# Patient Record
Sex: MALE | Race: WHITE | Employment: UNEMPLOYED | URBAN - METROPOLITAN AREA
[De-identification: names, ages, dates, MRNs, and addresses within clinical notes are randomized per-mention and may not be internally consistent; named-entity substitution may affect disease eponyms.]

---

## 2017-01-26 ENCOUNTER — HOSPITAL ENCOUNTER (EMERGENCY)
Age: 37
Discharge: HOME | End: 2017-01-26
Attending: EMERGENCY MEDICINE

## 2017-01-26 ENCOUNTER — APPOINTMENT (OUTPATIENT)
Dept: GENERAL RADIOLOGY | Age: 37
End: 2017-01-26

## 2017-01-26 VITALS
WEIGHT: 220 LBS | DIASTOLIC BLOOD PRESSURE: 81 MMHG | BODY MASS INDEX: 26.79 KG/M2 | HEART RATE: 80 BPM | TEMPERATURE: 98.5 F | SYSTOLIC BLOOD PRESSURE: 132 MMHG | HEIGHT: 76 IN | OXYGEN SATURATION: 99 % | RESPIRATION RATE: 20 BRPM

## 2017-01-26 DIAGNOSIS — J40 BRONCHITIS: Primary | ICD-10-CM

## 2017-01-26 PROCEDURE — 93005 ELECTROCARDIOGRAM TRACING: CPT

## 2017-01-26 PROCEDURE — 71020 XR CHEST STANDARD TWO VW: CPT

## 2017-01-26 PROCEDURE — 99284 EMERGENCY DEPT VISIT MOD MDM: CPT

## 2017-01-26 RX ORDER — DEXTROMETHORPHAN HYDROBROMIDE AND PROMETHAZINE HYDROCHLORIDE 15; 6.25 MG/5ML; MG/5ML
5 SYRUP ORAL 4 TIMES DAILY PRN
Qty: 120 ML | Refills: 0 | Status: SHIPPED | OUTPATIENT
Start: 2017-01-26 | End: 2017-02-02

## 2017-01-26 RX ORDER — AZITHROMYCIN 250 MG/1
TABLET, FILM COATED ORAL
Qty: 1 PACKET | Refills: 0 | Status: SHIPPED | OUTPATIENT
Start: 2017-01-26 | End: 2017-02-05

## 2017-01-26 ASSESSMENT — PAIN SCALES - GENERAL: PAINLEVEL_OUTOF10: 3

## 2017-01-26 ASSESSMENT — PAIN DESCRIPTION - PAIN TYPE: TYPE: ACUTE PAIN

## 2017-01-26 ASSESSMENT — PAIN DESCRIPTION - ORIENTATION: ORIENTATION: LEFT;RIGHT

## 2017-01-26 ASSESSMENT — ENCOUNTER SYMPTOMS
EYES NEGATIVE: 1
RHINORRHEA: 1
COUGH: 1
STRIDOR: 0
ALLERGIC/IMMUNOLOGIC NEGATIVE: 1
WHEEZING: 0
VOMITING: 0
ABDOMINAL PAIN: 0
TROUBLE SWALLOWING: 0
SHORTNESS OF BREATH: 0
NAUSEA: 0
SORE THROAT: 1
SINUS PRESSURE: 1

## 2017-01-26 ASSESSMENT — PAIN DESCRIPTION - FREQUENCY: FREQUENCY: INTERMITTENT

## 2017-01-26 ASSESSMENT — PAIN DESCRIPTION - ONSET: ONSET: PROGRESSIVE

## 2017-01-26 ASSESSMENT — PAIN DESCRIPTION - DESCRIPTORS: DESCRIPTORS: PRESSURE

## 2017-01-26 ASSESSMENT — PAIN DESCRIPTION - LOCATION: LOCATION: HEAD

## 2017-01-26 ASSESSMENT — PAIN DESCRIPTION - PROGRESSION: CLINICAL_PROGRESSION: NOT CHANGED

## 2017-02-09 LAB
EKG ATRIAL RATE: 76 BPM
EKG P AXIS: 50 DEGREES
EKG P-R INTERVAL: 134 MS
EKG Q-T INTERVAL: 376 MS
EKG QRS DURATION: 96 MS
EKG QTC CALCULATION (BAZETT): 423 MS
EKG R AXIS: 30 DEGREES
EKG T AXIS: 26 DEGREES
EKG VENTRICULAR RATE: 76 BPM

## 2018-01-23 ENCOUNTER — HOSPITAL ENCOUNTER (OUTPATIENT)
Dept: PREADMISSION TESTING | Age: 38
Discharge: HOME | End: 2018-01-23
Payer: MEDICAID

## 2018-01-23 VITALS
HEIGHT: 76 IN | RESPIRATION RATE: 16 BRPM | DIASTOLIC BLOOD PRESSURE: 106 MMHG | WEIGHT: 238.8 LBS | OXYGEN SATURATION: 98 % | TEMPERATURE: 97.5 F | SYSTOLIC BLOOD PRESSURE: 173 MMHG | BODY MASS INDEX: 29.08 KG/M2 | HEART RATE: 66 BPM

## 2018-01-23 DIAGNOSIS — F17.200 SMOKER: Chronic | ICD-10-CM

## 2018-01-23 PROBLEM — H91.90 HEARING LOSS: Status: ACTIVE | Noted: 2018-01-23

## 2018-01-23 PROBLEM — I10 HTN (HYPERTENSION): Chronic | Status: ACTIVE | Noted: 2018-01-23

## 2018-01-23 PROBLEM — H66.90 OTITIS MEDIA: Status: ACTIVE | Noted: 2018-01-23

## 2018-01-23 PROBLEM — H93.19 TINNITUS: Status: ACTIVE | Noted: 2018-01-23

## 2018-01-23 RX ORDER — SODIUM CHLORIDE 0.9 % (FLUSH) 0.9 %
10 SYRINGE (ML) INJECTION PRN
Status: CANCELLED | OUTPATIENT
Start: 2018-01-23

## 2018-01-23 RX ORDER — SODIUM CHLORIDE 0.9 % (FLUSH) 0.9 %
10 SYRINGE (ML) INJECTION EVERY 12 HOURS SCHEDULED
Status: CANCELLED | OUTPATIENT
Start: 2018-01-23

## 2018-01-23 RX ORDER — SODIUM CHLORIDE, SODIUM LACTATE, POTASSIUM CHLORIDE, CALCIUM CHLORIDE 600; 310; 30; 20 MG/100ML; MG/100ML; MG/100ML; MG/100ML
INJECTION, SOLUTION INTRAVENOUS CONTINUOUS
Status: CANCELLED | OUTPATIENT
Start: 2018-01-23

## 2018-01-23 RX ORDER — LIDOCAINE HYDROCHLORIDE 10 MG/ML
1 INJECTION, SOLUTION EPIDURAL; INFILTRATION; INTRACAUDAL; PERINEURAL
Status: CANCELLED | OUTPATIENT
Start: 2018-01-23 | End: 2018-01-23

## 2018-01-23 ASSESSMENT — ENCOUNTER SYMPTOMS
STRIDOR: 0
ABDOMINAL PAIN: 0
SORE THROAT: 0
RESPIRATORY NEGATIVE: 1
CONSTIPATION: 0
COUGH: 0
DIARRHEA: 0
WHEEZING: 0
BACK PAIN: 0
SHORTNESS OF BREATH: 0
HEARTBURN: 0
VOMITING: 0
NAUSEA: 0
EYES NEGATIVE: 1

## 2018-01-23 NOTE — H&P
Genitourinary Comments: deferred   Musculoskeletal: Normal range of motion. He exhibits no edema or tenderness. Lymphadenopathy:     He has no cervical adenopathy. Neurological: He is alert and oriented to person, place, and time. Skin: Skin is warm. No rash noted. No erythema.        Assessment:  Bilateral hearing loss, Otitis media, tinnitus    Plan:  Bilateral myringotomy with ventilating tube insertion    Josefina Victoria NP  1/23/2018

## 2018-02-07 ENCOUNTER — ANESTHESIA EVENT (OUTPATIENT)
Dept: OPERATING ROOM | Age: 38
End: 2018-02-07
Payer: COMMERCIAL

## 2018-02-07 ASSESSMENT — LIFESTYLE VARIABLES: SMOKING_STATUS: 1

## 2018-02-08 ENCOUNTER — HOSPITAL ENCOUNTER (OUTPATIENT)
Age: 38
Setting detail: OUTPATIENT SURGERY
Discharge: HOME | End: 2018-02-08
Attending: OTOLARYNGOLOGY | Admitting: OTOLARYNGOLOGY
Payer: COMMERCIAL

## 2018-02-08 ENCOUNTER — ANESTHESIA (OUTPATIENT)
Dept: OPERATING ROOM | Age: 38
End: 2018-02-08
Payer: COMMERCIAL

## 2018-02-08 VITALS — OXYGEN SATURATION: 94 % | DIASTOLIC BLOOD PRESSURE: 76 MMHG | SYSTOLIC BLOOD PRESSURE: 123 MMHG

## 2018-02-08 VITALS
DIASTOLIC BLOOD PRESSURE: 100 MMHG | SYSTOLIC BLOOD PRESSURE: 160 MMHG | RESPIRATION RATE: 16 BRPM | TEMPERATURE: 98 F | WEIGHT: 238 LBS | HEIGHT: 76 IN | BODY MASS INDEX: 28.98 KG/M2 | HEART RATE: 68 BPM | OXYGEN SATURATION: 97 %

## 2018-02-08 DIAGNOSIS — H65.23 BILATERAL CHRONIC SEROUS OTITIS MEDIA: Primary | ICD-10-CM

## 2018-02-08 PROCEDURE — 3600000002 HC SURGERY LEVEL 2 BASE: Performed by: OTOLARYNGOLOGY

## 2018-02-08 PROCEDURE — 3600000012 HC SURGERY LEVEL 2 ADDTL 15MIN: Performed by: OTOLARYNGOLOGY

## 2018-02-08 PROCEDURE — 2580000003 HC RX 258: Performed by: OTOLARYNGOLOGY

## 2018-02-08 PROCEDURE — 6360000002 HC RX W HCPCS: Performed by: NURSE ANESTHETIST, CERTIFIED REGISTERED

## 2018-02-08 PROCEDURE — 3700000001 HC ADD 15 MINUTES (ANESTHESIA): Performed by: OTOLARYNGOLOGY

## 2018-02-08 PROCEDURE — 6370000000 HC RX 637 (ALT 250 FOR IP): Performed by: OTOLARYNGOLOGY

## 2018-02-08 PROCEDURE — 3700000000 HC ANESTHESIA ATTENDED CARE: Performed by: OTOLARYNGOLOGY

## 2018-02-08 PROCEDURE — 6360000002 HC RX W HCPCS: Performed by: ANESTHESIOLOGY

## 2018-02-08 PROCEDURE — 7100000000 HC PACU RECOVERY - FIRST 15 MIN: Performed by: OTOLARYNGOLOGY

## 2018-02-08 PROCEDURE — 6370000000 HC RX 637 (ALT 250 FOR IP): Performed by: ANESTHESIOLOGY

## 2018-02-08 PROCEDURE — 2580000003 HC RX 258: Performed by: STUDENT IN AN ORGANIZED HEALTH CARE EDUCATION/TRAINING PROGRAM

## 2018-02-08 PROCEDURE — 7100000010 HC PHASE II RECOVERY - FIRST 15 MIN: Performed by: OTOLARYNGOLOGY

## 2018-02-08 PROCEDURE — 7100000001 HC PACU RECOVERY - ADDTL 15 MIN: Performed by: OTOLARYNGOLOGY

## 2018-02-08 PROCEDURE — 2500000003 HC RX 250 WO HCPCS: Performed by: NURSE ANESTHETIST, CERTIFIED REGISTERED

## 2018-02-08 PROCEDURE — 2500000003 HC RX 250 WO HCPCS: Performed by: STUDENT IN AN ORGANIZED HEALTH CARE EDUCATION/TRAINING PROGRAM

## 2018-02-08 PROCEDURE — 2780000010 HC IMPLANT OTHER: Performed by: OTOLARYNGOLOGY

## 2018-02-08 DEVICE — TUBE EAR VENTILATION 1.14X7.5MM: Type: IMPLANTABLE DEVICE | Status: FUNCTIONAL

## 2018-02-08 RX ORDER — LIDOCAINE HYDROCHLORIDE 10 MG/ML
1 INJECTION, SOLUTION EPIDURAL; INFILTRATION; INTRACAUDAL; PERINEURAL
Status: COMPLETED | OUTPATIENT
Start: 2018-02-08 | End: 2018-02-08

## 2018-02-08 RX ORDER — MIDAZOLAM HYDROCHLORIDE 1 MG/ML
INJECTION INTRAMUSCULAR; INTRAVENOUS PRN
Status: DISCONTINUED | OUTPATIENT
Start: 2018-02-08 | End: 2018-02-08 | Stop reason: SDUPTHER

## 2018-02-08 RX ORDER — HYDROCODONE BITARTRATE AND ACETAMINOPHEN 5; 325 MG/1; MG/1
1 TABLET ORAL PRN
Status: COMPLETED | OUTPATIENT
Start: 2018-02-08 | End: 2018-02-08

## 2018-02-08 RX ORDER — SODIUM CHLORIDE 0.9 % (FLUSH) 0.9 %
10 SYRINGE (ML) INJECTION EVERY 12 HOURS SCHEDULED
Status: DISCONTINUED | OUTPATIENT
Start: 2018-02-08 | End: 2018-02-08 | Stop reason: HOSPADM

## 2018-02-08 RX ORDER — DEXAMETHASONE SODIUM PHOSPHATE 10 MG/ML
INJECTION INTRAMUSCULAR; INTRAVENOUS PRN
Status: DISCONTINUED | OUTPATIENT
Start: 2018-02-08 | End: 2018-02-08 | Stop reason: SDUPTHER

## 2018-02-08 RX ORDER — SODIUM CHLORIDE, SODIUM LACTATE, POTASSIUM CHLORIDE, CALCIUM CHLORIDE 600; 310; 30; 20 MG/100ML; MG/100ML; MG/100ML; MG/100ML
INJECTION, SOLUTION INTRAVENOUS CONTINUOUS
Status: DISCONTINUED | OUTPATIENT
Start: 2018-02-08 | End: 2018-02-08 | Stop reason: HOSPADM

## 2018-02-08 RX ORDER — SODIUM CHLORIDE 0.9 % (FLUSH) 0.9 %
10 SYRINGE (ML) INJECTION PRN
Status: DISCONTINUED | OUTPATIENT
Start: 2018-02-08 | End: 2018-02-08 | Stop reason: HOSPADM

## 2018-02-08 RX ORDER — ACETAMINOPHEN 325 MG/1
650 TABLET ORAL EVERY 4 HOURS PRN
Status: DISCONTINUED | OUTPATIENT
Start: 2018-02-08 | End: 2018-02-08 | Stop reason: HOSPADM

## 2018-02-08 RX ORDER — METOCLOPRAMIDE HYDROCHLORIDE 5 MG/ML
10 INJECTION INTRAMUSCULAR; INTRAVENOUS
Status: DISCONTINUED | OUTPATIENT
Start: 2018-02-08 | End: 2018-02-08 | Stop reason: HOSPADM

## 2018-02-08 RX ORDER — DIPHENHYDRAMINE HYDROCHLORIDE 50 MG/ML
12.5 INJECTION INTRAMUSCULAR; INTRAVENOUS
Status: DISCONTINUED | OUTPATIENT
Start: 2018-02-08 | End: 2018-02-08 | Stop reason: HOSPADM

## 2018-02-08 RX ORDER — MAGNESIUM HYDROXIDE 1200 MG/15ML
LIQUID ORAL CONTINUOUS PRN
Status: DISCONTINUED | OUTPATIENT
Start: 2018-02-08 | End: 2018-02-08 | Stop reason: HOSPADM

## 2018-02-08 RX ORDER — FENTANYL CITRATE 50 UG/ML
50 INJECTION, SOLUTION INTRAMUSCULAR; INTRAVENOUS EVERY 10 MIN PRN
Status: DISCONTINUED | OUTPATIENT
Start: 2018-02-08 | End: 2018-02-08 | Stop reason: HOSPADM

## 2018-02-08 RX ORDER — MEPERIDINE HYDROCHLORIDE 25 MG/ML
12.5 INJECTION INTRAMUSCULAR; INTRAVENOUS; SUBCUTANEOUS EVERY 5 MIN PRN
Status: DISCONTINUED | OUTPATIENT
Start: 2018-02-08 | End: 2018-02-08 | Stop reason: HOSPADM

## 2018-02-08 RX ORDER — ONDANSETRON 2 MG/ML
INJECTION INTRAMUSCULAR; INTRAVENOUS PRN
Status: DISCONTINUED | OUTPATIENT
Start: 2018-02-08 | End: 2018-02-08 | Stop reason: SDUPTHER

## 2018-02-08 RX ORDER — PROPOFOL 10 MG/ML
INJECTION, EMULSION INTRAVENOUS PRN
Status: DISCONTINUED | OUTPATIENT
Start: 2018-02-08 | End: 2018-02-08 | Stop reason: SDUPTHER

## 2018-02-08 RX ORDER — ONDANSETRON 2 MG/ML
4 INJECTION INTRAMUSCULAR; INTRAVENOUS
Status: DISCONTINUED | OUTPATIENT
Start: 2018-02-08 | End: 2018-02-08 | Stop reason: HOSPADM

## 2018-02-08 RX ORDER — LIDOCAINE HYDROCHLORIDE 20 MG/ML
INJECTION, SOLUTION INFILTRATION; PERINEURAL PRN
Status: DISCONTINUED | OUTPATIENT
Start: 2018-02-08 | End: 2018-02-08 | Stop reason: SDUPTHER

## 2018-02-08 RX ORDER — ONDANSETRON 2 MG/ML
4 INJECTION INTRAMUSCULAR; INTRAVENOUS EVERY 6 HOURS PRN
Status: DISCONTINUED | OUTPATIENT
Start: 2018-02-08 | End: 2018-02-08 | Stop reason: HOSPADM

## 2018-02-08 RX ORDER — FENTANYL CITRATE 50 UG/ML
INJECTION, SOLUTION INTRAMUSCULAR; INTRAVENOUS PRN
Status: DISCONTINUED | OUTPATIENT
Start: 2018-02-08 | End: 2018-02-08 | Stop reason: SDUPTHER

## 2018-02-08 RX ORDER — HYDROCODONE BITARTRATE AND ACETAMINOPHEN 5; 325 MG/1; MG/1
2 TABLET ORAL PRN
Status: COMPLETED | OUTPATIENT
Start: 2018-02-08 | End: 2018-02-08

## 2018-02-08 RX ADMIN — LIDOCAINE HYDROCHLORIDE 0.1 ML: 10 INJECTION, SOLUTION EPIDURAL; INFILTRATION; INTRACAUDAL; PERINEURAL at 07:37

## 2018-02-08 RX ADMIN — LIDOCAINE HYDROCHLORIDE 100 MG: 20 INJECTION, SOLUTION INFILTRATION; PERINEURAL at 08:46

## 2018-02-08 RX ADMIN — SODIUM CHLORIDE, POTASSIUM CHLORIDE, SODIUM LACTATE AND CALCIUM CHLORIDE: 600; 310; 30; 20 INJECTION, SOLUTION INTRAVENOUS at 09:10

## 2018-02-08 RX ADMIN — PROPOFOL 200 MG: 10 INJECTION, EMULSION INTRAVENOUS at 08:46

## 2018-02-08 RX ADMIN — FENTANYL CITRATE 50 MCG: 50 INJECTION, SOLUTION INTRAMUSCULAR; INTRAVENOUS at 09:05

## 2018-02-08 RX ADMIN — FENTANYL CITRATE 50 MCG: 50 INJECTION, SOLUTION INTRAMUSCULAR; INTRAVENOUS at 08:50

## 2018-02-08 RX ADMIN — MIDAZOLAM HYDROCHLORIDE 2 MG: 1 INJECTION, SOLUTION INTRAMUSCULAR; INTRAVENOUS at 08:35

## 2018-02-08 RX ADMIN — HYDROCODONE BITARTRATE AND ACETAMINOPHEN 2 TABLET: 5; 325 TABLET ORAL at 09:57

## 2018-02-08 RX ADMIN — ONDANSETRON 4 MG: 2 INJECTION INTRAMUSCULAR; INTRAVENOUS at 08:55

## 2018-02-08 RX ADMIN — DEXAMETHASONE SODIUM PHOSPHATE 10 MG: 10 INJECTION INTRAMUSCULAR; INTRAVENOUS at 08:55

## 2018-02-08 RX ADMIN — FENTANYL CITRATE 25 MCG: 50 INJECTION, SOLUTION INTRAMUSCULAR; INTRAVENOUS at 09:34

## 2018-02-08 RX ADMIN — PROPOFOL 50 MG: 10 INJECTION, EMULSION INTRAVENOUS at 08:49

## 2018-02-08 RX ADMIN — FENTANYL CITRATE 50 MCG: 50 INJECTION, SOLUTION INTRAMUSCULAR; INTRAVENOUS at 08:46

## 2018-02-08 RX ADMIN — FENTANYL CITRATE 50 MCG: 50 INJECTION, SOLUTION INTRAMUSCULAR; INTRAVENOUS at 08:48

## 2018-02-08 RX ADMIN — SODIUM CHLORIDE, POTASSIUM CHLORIDE, SODIUM LACTATE AND CALCIUM CHLORIDE: 600; 310; 30; 20 INJECTION, SOLUTION INTRAVENOUS at 07:37

## 2018-02-08 ASSESSMENT — PULMONARY FUNCTION TESTS
PIF_VALUE: 3
PIF_VALUE: 11
PIF_VALUE: 1
PIF_VALUE: 3
PIF_VALUE: 3
PIF_VALUE: 11
PIF_VALUE: 12
PIF_VALUE: 2
PIF_VALUE: 11
PIF_VALUE: 0
PIF_VALUE: 11
PIF_VALUE: 11
PIF_VALUE: 3
PIF_VALUE: 2
PIF_VALUE: 4
PIF_VALUE: 11
PIF_VALUE: 11
PIF_VALUE: 2
PIF_VALUE: 12
PIF_VALUE: 3
PIF_VALUE: 11
PIF_VALUE: 3
PIF_VALUE: 2
PIF_VALUE: 3
PIF_VALUE: 2
PIF_VALUE: 1
PIF_VALUE: 4
PIF_VALUE: 2
PIF_VALUE: 3
PIF_VALUE: 11
PIF_VALUE: 11
PIF_VALUE: 0

## 2018-02-08 ASSESSMENT — PAIN - FUNCTIONAL ASSESSMENT: PAIN_FUNCTIONAL_ASSESSMENT: 0-10

## 2018-02-08 ASSESSMENT — PAIN SCALES - GENERAL
PAINLEVEL_OUTOF10: 5
PAINLEVEL_OUTOF10: 3

## 2018-02-08 NOTE — BRIEF OP NOTE
Brief Postoperative Note  ______________________________________________________________    Patient: Clora Range  YOB: 1980  MRN: [id number]  Date of Procedure: 2/8/2018    Pre-Op Diagnosis: CONDUCTIVE HEARING LOSS, BILATERAL,SENSORINEURAL HEARING LOSS,BILATERAL, OTITIS MEDIA,UNSPECIFIED BILATERAL TINNITUS,BILATERAL    Post-Op Diagnosis: Same       Procedure(s):  BILATERAL MYRINGOTOMY WITH VENTILING TUBE INSERTION (PAT DONE 1/22/18)    Anesthesia: General    Surgeon(s):  Hernan Carballo MD    Staff:  Scrub Person First: Brant Maxwell     Estimated Blood Loss: * No values recorded between 2/8/2018  8:40 AM and 2/8/2018  9:20 AM * None    Complications: None    Specimens:   * No specimens in log *    Implants:    Implant Name Type Inv. Item Serial No.  Lot No. LRB No. Used   TUBE EAR VENTILATION 1.14X7. 5MM Ear TUBE EAR VENTILATION 1.14X7. 5MM  [employer] [id number] Right 1   TUBE EAR VENTILATION 1.14X7. 5MM Ear TUBE EAR VENTILATION 1.14X7. [facility] [id number] Left 1         Drains:      Findings: right ear: 2 areas with monomeric membranes left: small perforation of he TM  Hernan Carballo MD  Date: 2/8/2018  Time: 9:24 AM

## 2018-02-08 NOTE — ANESTHESIA POSTPROCEDURE EVALUATION
Department of Anesthesiology  Postprocedure Note    Patient: Frieda Byrd  MRN: [id number]  YOB: 1980  Date of evaluation: 2/8/2018  Time:  9:25 AM     Procedure Summary     Date:  02/08/18 [place]  [facility] [place] [facility] [place]    Anesthesia Start:  8497 Anesthesia Stop:      Procedure:  BILATERAL MYRINGOTOMY WITH VENTILING TUBE INSERTION (PAT DONE 1/22/18) (Bilateral ) Diagnosis:  (CONDUCTIVE HEARING LOSS, BILATERAL,SENSORINEURAL HEARING LOSS,BILATERAL, OTITIS MEDIA,UNSPECIFIED BILATERAL TINNITUS,BILATERAL)    Surgeon:  Argelia Ruth MD Responsible Provider:  Jony Vasquez MD    Anesthesia Type:  general ASA Status:  2          Anesthesia Type: general    Mike Phase I:      Mike Phase II:      Last vitals: Reviewed and per EMR flowsheets.        Anesthesia Post Evaluation    Patient location during evaluation: PACU  Patient participation: complete - patient participated  Level of consciousness: awake and alert  Pain score: 0  Airway patency: patent  Nausea & Vomiting: no nausea and no vomiting  Complications: no  Cardiovascular status: blood pressure returned to baseline and hemodynamically stable  Respiratory status: acceptable  Hydration status: euvolemic

## 2019-08-01 ENCOUNTER — OFFICE VISIT (OUTPATIENT)
Dept: FAMILY MEDICINE CLINIC | Age: 39
End: 2019-08-01
Payer: COMMERCIAL

## 2019-08-01 VITALS
OXYGEN SATURATION: 99 % | HEIGHT: 76 IN | HEART RATE: 99 BPM | DIASTOLIC BLOOD PRESSURE: 132 MMHG | WEIGHT: 255.8 LBS | BODY MASS INDEX: 31.15 KG/M2 | SYSTOLIC BLOOD PRESSURE: 178 MMHG | TEMPERATURE: 98.6 F

## 2019-08-01 DIAGNOSIS — Z13.31 POSITIVE DEPRESSION SCREENING: ICD-10-CM

## 2019-08-01 DIAGNOSIS — I10 ESSENTIAL HYPERTENSION: Primary | Chronic | ICD-10-CM

## 2019-08-01 DIAGNOSIS — F17.210 CIGARETTE NICOTINE DEPENDENCE WITHOUT COMPLICATION: ICD-10-CM

## 2019-08-01 PROCEDURE — 99214 OFFICE O/P EST MOD 30 MIN: CPT | Performed by: INTERNAL MEDICINE

## 2019-08-01 PROCEDURE — G8431 POS CLIN DEPRES SCRN F/U DOC: HCPCS | Performed by: INTERNAL MEDICINE

## 2019-08-01 RX ORDER — NICOTINE 21 MG/24HR
1 PATCH, TRANSDERMAL 24 HOURS TRANSDERMAL DAILY
Qty: 14 PATCH | Refills: 5 | Status: SHIPPED | OUTPATIENT
Start: 2019-08-01 | End: 2021-05-11

## 2019-08-01 RX ORDER — CHLORTHALIDONE 25 MG/1
25 TABLET ORAL DAILY
Qty: 30 TABLET | Refills: 3 | Status: SHIPPED | OUTPATIENT
Start: 2019-08-01 | End: 2019-09-13 | Stop reason: SDUPTHER

## 2019-08-01 RX ORDER — CITALOPRAM 20 MG/1
20 TABLET ORAL DAILY
Qty: 30 TABLET | Refills: 0 | Status: SHIPPED | OUTPATIENT
Start: 2019-08-01 | End: 2019-09-13 | Stop reason: SDUPTHER

## 2019-08-01 ASSESSMENT — PATIENT HEALTH QUESTIONNAIRE - PHQ9
1. LITTLE INTEREST OR PLEASURE IN DOING THINGS: 3
SUM OF ALL RESPONSES TO PHQ QUESTIONS 1-9: 21
4. FEELING TIRED OR HAVING LITTLE ENERGY: 3
5. POOR APPETITE OR OVEREATING: 3
SUM OF ALL RESPONSES TO PHQ QUESTIONS 1-9: 21
9. THOUGHTS THAT YOU WOULD BE BETTER OFF DEAD, OR OF HURTING YOURSELF: 0
DEPRESSION UNABLE TO ASSESS: FUNCTIONAL CAPACITY MOTIVATION LIMITS ACCURACY
2. FEELING DOWN, DEPRESSED OR HOPELESS: 3
SUM OF ALL RESPONSES TO PHQ9 QUESTIONS 1 & 2: 6
8. MOVING OR SPEAKING SO SLOWLY THAT OTHER PEOPLE COULD HAVE NOTICED. OR THE OPPOSITE, BEING SO FIGETY OR RESTLESS THAT YOU HAVE BEEN MOVING AROUND A LOT MORE THAN USUAL: 3
3. TROUBLE FALLING OR STAYING ASLEEP: 3
10. IF YOU CHECKED OFF ANY PROBLEMS, HOW DIFFICULT HAVE THESE PROBLEMS MADE IT FOR YOU TO DO YOUR WORK, TAKE CARE OF THINGS AT HOME, OR GET ALONG WITH OTHER PEOPLE: 3
7. TROUBLE CONCENTRATING ON THINGS, SUCH AS READING THE NEWSPAPER OR WATCHING TELEVISION: 3
6. FEELING BAD ABOUT YOURSELF - OR THAT YOU ARE A FAILURE OR HAVE LET YOURSELF OR YOUR FAMILY DOWN: 0

## 2019-08-01 ASSESSMENT — ENCOUNTER SYMPTOMS
BACK PAIN: 0
EYE REDNESS: 0
PHOTOPHOBIA: 0
CHEST TIGHTNESS: 0
TROUBLE SWALLOWING: 0
ABDOMINAL PAIN: 0
SHORTNESS OF BREATH: 0
SINUS PAIN: 0
RHINORRHEA: 0
VOICE CHANGE: 0
BLOOD IN STOOL: 0
EYE PAIN: 0
NAUSEA: 0
EYE ITCHING: 0
VOMITING: 0
DIARRHEA: 0
FACIAL SWELLING: 0
RECTAL PAIN: 0
EYE DISCHARGE: 0
SINUS PRESSURE: 0
ABDOMINAL DISTENTION: 0
COLOR CHANGE: 0
SORE THROAT: 0
CONSTIPATION: 0
WHEEZING: 0
COUGH: 0
APNEA: 0

## 2019-08-01 NOTE — PROGRESS NOTES
week     Comment: 6 pkg per day    Drug use: Yes     Types: Marijuana     Comment: 2-3 x per week    Sexual activity: Not on file   Lifestyle    Physical activity:     Days per week: Not on file     Minutes per session: Not on file    Stress: Not on file   Relationships    Social connections:     Talks on phone: Not on file     Gets together: Not on file     Attends Catholic service: Not on file     Active member of club or organization: Not on file     Attends meetings of clubs or organizations: Not on file     Relationship status: Not on file    Intimate partner violence:     Fear of current or ex partner: Not on file     Emotionally abused: Not on file     Physically abused: Not on file     Forced sexual activity: Not on file   Other Topics Concern    Not on file   Social History Narrative    Not on file     Family History   Problem Relation Age of Onset    High Blood Pressure Mother     Heart Disease Mother     Other Mother         copd / smoker    Stroke Father     High Blood Pressure Brother     Other Brother         anxiety     No Known Allergies  No current outpatient medications on file prior to visit. No current facility-administered medications on file prior to visit. Review of Systems   Constitutional: Positive for diaphoresis. Negative for chills, fatigue and fever. HENT: Negative for congestion, dental problem, drooling, ear discharge, ear pain, facial swelling, hearing loss, mouth sores, nosebleeds, postnasal drip, rhinorrhea, sinus pressure, sinus pain, sneezing, sore throat, tinnitus, trouble swallowing and voice change. Eyes: Negative for photophobia, pain, discharge, redness, itching and visual disturbance. Respiratory: Negative for apnea, cough, chest tightness, shortness of breath and wheezing. Cardiovascular: Positive for palpitations. Negative for chest pain and leg swelling.    Gastrointestinal: Negative for abdominal distention, abdominal pain, blood place, and time. Skin: Skin is warm and dry. No rash noted. No erythema. No pallor. Psychiatric: He has a normal mood and affect. Judgment and thought content normal.       Assessment:       Diagnosis Orders   1. Essential hypertension  Basic Metabolic Panel    CBC with Differential    Lipid Panel    TSH with Reflex    chlorthalidone (HYGROTON) 25 MG tablet   2. Cigarette nicotine dependence without complication  nicotine (NICODERM CQ) 14 MG/24HR   3. Positive depression screening  Positive Screen for Clinical Depression with a Documented Follow-up Plan          Plan:      Nikko Esqueda was seen today for establish care and anxiety. Diagnoses and all orders for this visit:    Essential hypertension  -     Basic Metabolic Panel; Future  -     CBC with Differential; Future  -     Lipid Panel; Future  -     TSH with Reflex; Future  -     chlorthalidone (HYGROTON) 25 MG tablet; Take 1 tablet by mouth daily    Cigarette nicotine dependence without complication  -     nicotine (NICODERM CQ) 14 MG/24HR; Place 1 patch onto the skin daily for 14 days    Positive depression screening  -     Positive Screen for Clinical Depression with a Documented Follow-up Plan     Other orders  -     citalopram (CELEXA) 20 MG tablet; Take 1 tablet by mouth daily         Return in 2 weeks (on 8/15/2019). Alex Wong MD    Please note, this report has been partially produced using speech recognition software  and may cause  and /or contain errors related to that system including grammar, punctuation and spelling as well as words and phrases that may seem inappropriate. If there are questions or concerns please feel free to contact me to clarify. On the basis of positive PHQ-9 screening (PHQ-9 Total Score: 21), the following plan was implemented: medication prescribed: Celexa- 20 mg- patient will call for any significant medication side effects or worsening symptoms of depression.   Patient will follow-up in 2 week(s) with PCP.

## 2019-08-06 DIAGNOSIS — I10 ESSENTIAL HYPERTENSION: Chronic | ICD-10-CM

## 2019-08-06 LAB
ANION GAP SERPL CALCULATED.3IONS-SCNC: 18 MEQ/L (ref 9–15)
ANISOCYTOSIS: ABNORMAL
BANDED NEUTROPHILS RELATIVE PERCENT: 4 %
BASOPHILS ABSOLUTE: 0 K/UL (ref 0–0.2)
BASOPHILS RELATIVE PERCENT: 0.3 %
BUN BLDV-MCNC: 9 MG/DL (ref 6–20)
CALCIUM SERPL-MCNC: 10.8 MG/DL (ref 8.5–9.9)
CHLORIDE BLD-SCNC: 91 MEQ/L (ref 95–107)
CHOLESTEROL, TOTAL: 253 MG/DL (ref 0–199)
CO2: 25 MEQ/L (ref 20–31)
CREAT SERPL-MCNC: 0.86 MG/DL (ref 0.7–1.2)
EOSINOPHILS ABSOLUTE: 0.1 K/UL (ref 0–0.7)
EOSINOPHILS RELATIVE PERCENT: 1 %
GFR AFRICAN AMERICAN: >60
GFR NON-AFRICAN AMERICAN: >60
GLUCOSE BLD-MCNC: 101 MG/DL (ref 70–99)
HCT VFR BLD CALC: 50.8 % (ref 42–52)
HDLC SERPL-MCNC: 38 MG/DL (ref 40–59)
HEMOGLOBIN: 18.1 G/DL (ref 14–18)
LDL CHOLESTEROL CALCULATED: ABNORMAL MG/DL (ref 0–129)
LYMPHOCYTES ABSOLUTE: 2.3 K/UL (ref 1–4.8)
LYMPHOCYTES RELATIVE PERCENT: 28 %
MCH RBC QN AUTO: 37.8 PG (ref 27–31.3)
MCHC RBC AUTO-ENTMCNC: 35.6 % (ref 33–37)
MCV RBC AUTO: 106.3 FL (ref 80–100)
MONOCYTES ABSOLUTE: 0.4 K/UL (ref 0.2–0.8)
MONOCYTES RELATIVE PERCENT: 4.8 %
NEUTROPHILS ABSOLUTE: 5.5 K/UL (ref 1.4–6.5)
NEUTROPHILS RELATIVE PERCENT: 63 %
PDW BLD-RTO: 15.2 % (ref 11.5–14.5)
PLATELET # BLD: 212 K/UL (ref 130–400)
PLATELET SLIDE REVIEW: NORMAL
POTASSIUM SERPL-SCNC: 3.6 MEQ/L (ref 3.4–4.9)
RBC # BLD: 4.78 M/UL (ref 4.7–6.1)
SODIUM BLD-SCNC: 134 MEQ/L (ref 135–144)
TRIGL SERPL-MCNC: 417 MG/DL (ref 0–150)
TSH REFLEX: 2.58 UIU/ML (ref 0.44–3.86)
WBC # BLD: 8.2 K/UL (ref 4.8–10.8)

## 2019-08-07 RX ORDER — ATORVASTATIN CALCIUM 20 MG/1
20 TABLET, FILM COATED ORAL DAILY
Qty: 30 TABLET | Refills: 3 | Status: SHIPPED | OUTPATIENT
Start: 2019-08-07 | End: 2019-09-10

## 2019-08-15 ENCOUNTER — OFFICE VISIT (OUTPATIENT)
Dept: FAMILY MEDICINE CLINIC | Age: 39
End: 2019-08-15
Payer: COMMERCIAL

## 2019-08-15 VITALS
SYSTOLIC BLOOD PRESSURE: 172 MMHG | HEART RATE: 100 BPM | TEMPERATURE: 98.4 F | DIASTOLIC BLOOD PRESSURE: 106 MMHG | OXYGEN SATURATION: 98 %

## 2019-08-15 DIAGNOSIS — I10 ESSENTIAL HYPERTENSION: Primary | Chronic | ICD-10-CM

## 2019-08-15 DIAGNOSIS — F10.20 UNCOMPLICATED ALCOHOL DEPENDENCE (HCC): ICD-10-CM

## 2019-08-15 PROCEDURE — 99213 OFFICE O/P EST LOW 20 MIN: CPT | Performed by: INTERNAL MEDICINE

## 2019-08-15 RX ORDER — LISINOPRIL 10 MG/1
10 TABLET ORAL DAILY
Qty: 14 TABLET | Refills: 0 | Status: SHIPPED | OUTPATIENT
Start: 2019-08-15 | End: 2019-09-13 | Stop reason: SDUPTHER

## 2019-08-15 ASSESSMENT — ENCOUNTER SYMPTOMS
TROUBLE SWALLOWING: 0
FACIAL SWELLING: 0
SINUS PAIN: 0
BLOOD IN STOOL: 0
EYE DISCHARGE: 0
VOMITING: 0
EYE REDNESS: 0
RECTAL PAIN: 0
ABDOMINAL DISTENTION: 0
VOICE CHANGE: 0
PHOTOPHOBIA: 0
WHEEZING: 0
EYE ITCHING: 0
APNEA: 0
COLOR CHANGE: 0
RHINORRHEA: 0
SHORTNESS OF BREATH: 0
NAUSEA: 0
DIARRHEA: 0
COUGH: 0
SORE THROAT: 0
ABDOMINAL PAIN: 0
CONSTIPATION: 0
CHEST TIGHTNESS: 0
EYE PAIN: 0
BACK PAIN: 0
SINUS PRESSURE: 0

## 2019-08-15 NOTE — PROGRESS NOTES
Subjective:      Patient ID: Rosangela Castellanos is a 43 y.o. male who presents today for:  Chief Complaint   Patient presents with    Hypertension     pt presents here for his 2 week follow up, pt doing well today. HPI  65-year-old male presents for two-week follow-up to manage his hypertension. He has been compliant with chlorthalidone 25 mg orally daily since our prior visit. He has been unable to dissipate in aerobic exercise. He denies cardiopulmonary symptoms. He reports consuming large quantities of alcohol and would like assistance in achieving alcohol cessation. At present he denies polyuria,  Polydipsia, constitutional, sinus, visual, or cardio pulmonary, gastrointestinal, immunological, musculoskeletal, neurologic, hematologic, or psychiatric complaints. Past Medical History:   Diagnosis Date    Depression     Hypertension 2015    trx with pills x 1 month / patient did not follow up    Smoker      Past Surgical History:   Procedure Laterality Date    FL CREATE EARDRUM OPENING,GEN ANESTH Bilateral 2/8/2018    BILATERAL MYRINGOTOMY WITH VENTILING TUBE INSERTION (PAT DONE 1/22/18) performed by Momo Hunter MD at [place] [address] History     Socioeconomic History    Marital status: Single     Spouse name: Not on file    Number of children: Not on file    Years of education: Not on file    Highest education level: Not on file   Occupational History    Not on file   Social Needs    Financial resource strain: Not on file    Food insecurity:     Worry: Not on file     Inability: Not on file    Transportation needs:     Medical: Not on file     Non-medical: Not on file   Tobacco Use    Smoking status: Current Every Day Smoker     Packs/day: 1.00     Years: 20.00     Pack years: 20.00     Types: Cigarettes    Smokeless tobacco: Never Used   Substance and Sexual Activity    Alcohol use:  Yes     Alcohol/week: 6.0 standard drinks     Types: 6 Cans of beer per week     Comment: 6 pkg per day    Drug use: Yes     Types: Marijuana     Comment: 2-3 x per week    Sexual activity: Not on file   Lifestyle    Physical activity:     Days per week: Not on file     Minutes per session: Not on file    Stress: Not on file   Relationships    Social connections:     Talks on phone: Not on file     Gets together: Not on file     Attends Islam service: Not on file     Active member of club or organization: Not on file     Attends meetings of clubs or organizations: Not on file     Relationship status: Not on file    Intimate partner violence:     Fear of current or ex partner: Not on file     Emotionally abused: Not on file     Physically abused: Not on file     Forced sexual activity: Not on file   Other Topics Concern    Not on file   Social History Narrative    Not on file     Family History   Problem Relation Age of Onset    High Blood Pressure Mother     Heart Disease Mother     Other Mother         copd / smoker    Stroke Father     High Blood Pressure Brother     Other Brother         anxiety     No Known Allergies  Current Outpatient Medications on File Prior to Visit   Medication Sig Dispense Refill    atorvastatin (LIPITOR) 20 MG tablet Take 1 tablet by mouth daily 30 tablet 3    chlorthalidone (HYGROTON) 25 MG tablet Take 1 tablet by mouth daily 30 tablet 3    nicotine (NICODERM CQ) 14 MG/24HR Place 1 patch onto the skin daily for 14 days 14 patch 5    citalopram (CELEXA) 20 MG tablet Take 1 tablet by mouth daily 30 tablet 0     No current facility-administered medications on file prior to visit. Review of Systems   Constitutional: Negative for chills, diaphoresis, fatigue and fever.    HENT: Negative for congestion, dental problem, drooling, ear discharge, ear pain, facial swelling, hearing loss, mouth sores, nosebleeds, postnasal drip, rhinorrhea, sinus pressure, sinus pain, sneezing, sore throat, tinnitus, trouble swallowing and voice

## 2019-08-19 ENCOUNTER — HOSPITAL ENCOUNTER (EMERGENCY)
Age: 39
Discharge: NURSING, REHAB OR LONG-TERM CARE | End: 2019-08-19
Attending: EMERGENCY MEDICINE
Payer: COMMERCIAL

## 2019-08-19 VITALS
WEIGHT: 256 LBS | SYSTOLIC BLOOD PRESSURE: 134 MMHG | TEMPERATURE: 98.4 F | HEART RATE: 84 BPM | OXYGEN SATURATION: 98 % | DIASTOLIC BLOOD PRESSURE: 90 MMHG | BODY MASS INDEX: 31.17 KG/M2 | RESPIRATION RATE: 18 BRPM | HEIGHT: 76 IN

## 2019-08-19 DIAGNOSIS — F41.1 ANXIETY STATE: ICD-10-CM

## 2019-08-19 DIAGNOSIS — I10 ESSENTIAL HYPERTENSION: Primary | ICD-10-CM

## 2019-08-19 LAB
ALBUMIN SERPL-MCNC: 5.1 G/DL (ref 3.5–4.6)
ALP BLD-CCNC: 150 U/L (ref 35–104)
ALT SERPL-CCNC: 508 U/L (ref 0–41)
ANION GAP SERPL CALCULATED.3IONS-SCNC: 15 MEQ/L (ref 9–15)
AST SERPL-CCNC: 553 U/L (ref 0–40)
BASOPHILS ABSOLUTE: 0.1 K/UL (ref 0–0.2)
BASOPHILS RELATIVE PERCENT: 0.9 %
BILIRUB SERPL-MCNC: 0.4 MG/DL (ref 0.2–0.7)
BUN BLDV-MCNC: 15 MG/DL (ref 6–20)
CALCIUM SERPL-MCNC: 10.1 MG/DL (ref 8.5–9.9)
CHLORIDE BLD-SCNC: 93 MEQ/L (ref 95–107)
CO2: 28 MEQ/L (ref 20–31)
CREAT SERPL-MCNC: 1.02 MG/DL (ref 0.7–1.2)
EKG ATRIAL RATE: 77 BPM
EKG P AXIS: 61 DEGREES
EKG P-R INTERVAL: 134 MS
EKG Q-T INTERVAL: 362 MS
EKG QRS DURATION: 100 MS
EKG QTC CALCULATION (BAZETT): 409 MS
EKG R AXIS: 32 DEGREES
EKG T AXIS: 21 DEGREES
EKG VENTRICULAR RATE: 77 BPM
EOSINOPHILS ABSOLUTE: 0.1 K/UL (ref 0–0.7)
EOSINOPHILS RELATIVE PERCENT: 1.2 %
GFR AFRICAN AMERICAN: >60
GFR NON-AFRICAN AMERICAN: >60
GLOBULIN: 3.3 G/DL (ref 2.3–3.5)
GLUCOSE BLD-MCNC: 104 MG/DL (ref 70–99)
HCT VFR BLD CALC: 46.4 % (ref 42–52)
HEMOGLOBIN: 17.2 G/DL (ref 14–18)
LYMPHOCYTES ABSOLUTE: 2.3 K/UL (ref 1–4.8)
LYMPHOCYTES RELATIVE PERCENT: 29.4 %
MCH RBC QN AUTO: 38.4 PG (ref 27–31.3)
MCHC RBC AUTO-ENTMCNC: 37.1 % (ref 33–37)
MCV RBC AUTO: 103.4 FL (ref 80–100)
MONOCYTES ABSOLUTE: 0.5 K/UL (ref 0.2–0.8)
MONOCYTES RELATIVE PERCENT: 5.9 %
NEUTROPHILS ABSOLUTE: 4.8 K/UL (ref 1.4–6.5)
NEUTROPHILS RELATIVE PERCENT: 62.6 %
PDW BLD-RTO: 14.3 % (ref 11.5–14.5)
PLATELET # BLD: 196 K/UL (ref 130–400)
POTASSIUM SERPL-SCNC: 3.3 MEQ/L (ref 3.4–4.9)
RBC # BLD: 4.49 M/UL (ref 4.7–6.1)
SODIUM BLD-SCNC: 136 MEQ/L (ref 135–144)
TOTAL PROTEIN: 8.4 G/DL (ref 6.3–8)
TROPONIN: <0.01 NG/ML (ref 0–0.01)
WBC # BLD: 7.7 K/UL (ref 4.8–10.8)

## 2019-08-19 PROCEDURE — 84484 ASSAY OF TROPONIN QUANT: CPT

## 2019-08-19 PROCEDURE — 99283 EMERGENCY DEPT VISIT LOW MDM: CPT

## 2019-08-19 PROCEDURE — 80053 COMPREHEN METABOLIC PANEL: CPT

## 2019-08-19 PROCEDURE — 93005 ELECTROCARDIOGRAM TRACING: CPT | Performed by: EMERGENCY MEDICINE

## 2019-08-19 PROCEDURE — 85025 COMPLETE CBC W/AUTO DIFF WBC: CPT

## 2019-08-19 PROCEDURE — 36415 COLL VENOUS BLD VENIPUNCTURE: CPT

## 2019-08-19 RX ORDER — CLONIDINE HYDROCHLORIDE 0.1 MG/1
0.2 TABLET ORAL ONCE
Status: DISCONTINUED | OUTPATIENT
Start: 2019-08-19 | End: 2019-08-19 | Stop reason: HOSPADM

## 2019-08-19 RX ORDER — PHENOBARBITAL 97.2 MG/1
97.2 TABLET ORAL 2 TIMES DAILY
COMMUNITY
End: 2019-09-10 | Stop reason: ALTCHOICE

## 2019-08-19 RX ORDER — CLONIDINE HYDROCHLORIDE 0.1 MG/1
0.1 TABLET ORAL EVERY 6 HOURS
COMMUNITY
End: 2019-09-13 | Stop reason: SDUPTHER

## 2019-08-19 ASSESSMENT — ENCOUNTER SYMPTOMS
CHEST TIGHTNESS: 1
VOMITING: 0
NAUSEA: 0
SORE THROAT: 0
ABDOMINAL PAIN: 0
EYE PAIN: 0
SHORTNESS OF BREATH: 0

## 2019-08-19 ASSESSMENT — PAIN DESCRIPTION - DESCRIPTORS: DESCRIPTORS: TIGHTNESS

## 2019-08-19 ASSESSMENT — PAIN DESCRIPTION - ORIENTATION: ORIENTATION: MID

## 2019-08-19 ASSESSMENT — PAIN DESCRIPTION - PAIN TYPE: TYPE: ACUTE PAIN

## 2019-08-19 ASSESSMENT — PAIN SCALES - GENERAL: PAINLEVEL_OUTOF10: 5

## 2019-08-19 ASSESSMENT — PAIN - FUNCTIONAL ASSESSMENT: PAIN_FUNCTIONAL_ASSESSMENT: PREVENTS OR INTERFERES SOME ACTIVE ACTIVITIES AND ADLS

## 2019-08-19 ASSESSMENT — PAIN DESCRIPTION - FREQUENCY: FREQUENCY: INTERMITTENT

## 2019-08-19 ASSESSMENT — PAIN DESCRIPTION - LOCATION: LOCATION: CHEST

## 2019-08-19 ASSESSMENT — PAIN SCALES - WONG BAKER: WONGBAKER_NUMERICALRESPONSE: 2

## 2019-08-19 ASSESSMENT — PAIN DESCRIPTION - PROGRESSION: CLINICAL_PROGRESSION: NOT CHANGED

## 2019-08-19 ASSESSMENT — PAIN DESCRIPTION - ONSET: ONSET: ON-GOING

## 2019-08-19 NOTE — ED NOTES
Pt in room resting given pop and a snack pt alert rr even non labored and aware of the transportation back to his facility      Amy Hinds RN  08/19/19 0529

## 2019-08-20 NOTE — ED PROVIDER NOTES
6 Cans of beer per week     Comment: 6 pkg per day    Drug use: Yes     Types: Marijuana     Comment: 2-3 x per week    Sexual activity: None   Lifestyle    Physical activity:     Days per week: None     Minutes per session: None    Stress: None   Relationships    Social connections:     Talks on phone: None     Gets together: None     Attends Yarsanism service: None     Active member of club or organization: None     Attends meetings of clubs or organizations: None     Relationship status: None    Intimate partner violence:     Fear of current or ex partner: None     Emotionally abused: None     Physically abused: None     Forced sexual activity: None   Other Topics Concern    None   Social History Narrative    None       SCREENINGS              PHYSICAL EXAM    (up to 7 for level 4, 8 or more for level 5)     ED Triage Vitals [08/19/19 1528]   BP Temp Temp Source Pulse Resp SpO2 Height Weight   (!) 177/100 98.4 °F (36.9 °C) Oral 88 18 98 % 6' 4\" (1.93 m) 256 lb (116.1 kg)       Physical Exam   Constitutional: He is oriented to person, place, and time. He appears well-developed and well-nourished. No distress. HENT:   Head: Normocephalic and atraumatic. Right Ear: External ear normal.   Left Ear: External ear normal.   Mouth/Throat: No oropharyngeal exudate. Eyes: Pupils are equal, round, and reactive to light. Conjunctivae are normal.   Neck: Normal range of motion. Neck supple. No JVD present. No tracheal deviation present. No thyromegaly present. Cardiovascular: Normal rate and normal heart sounds. No murmur heard. Pulmonary/Chest: Effort normal and breath sounds normal. No respiratory distress. He has no wheezes. Abdominal: Soft. Bowel sounds are normal. There is no tenderness. There is no guarding. Musculoskeletal: Normal range of motion. He exhibits no edema. Neurological: He is alert and oriented to person, place, and time. No cranial nerve deficit. Skin: Skin is warm and dry.  No rash noted. He is not diaphoretic. Psychiatric: He has a normal mood and affect. His behavior is normal.   Nursing note and vitals reviewed. DIAGNOSTIC RESULTS     EKG: All EKG's are interpreted by the Emergency Department Physician who either signs or Co-signsthis chart in the absence of a cardiologist.        RADIOLOGY:   Victor[facility] Dollar such as CT, Ultrasound and MRI are read by the radiologist. Plain radiographic images are visualized and preliminarily interpreted by the emergency physician with the below findings:        Interpretation per the Radiologist below, if available at the time ofthis note:    No orders to display         ED BEDSIDE ULTRASOUND:   Performed by ED Physician - none    LABS:  Labs Reviewed   CBC WITH AUTO DIFFERENTIAL - Abnormal; Notable for the following components:       Result Value    RBC 4.49 (*)     .4 (*)     MCH 38.4 (*)     MCHC 37.1 (*)     All other components within normal limits   COMPREHENSIVE METABOLIC PANEL - Abnormal; Notable for the following components:    Potassium 3.3 (*)     Chloride 93 (*)     Glucose 104 (*)     Calcium 10.1 (*)     Total Protein 8.4 (*)     Alb 5.1 (*)     Alkaline Phosphatase 150 (*)      (*)      (*)     All other components within normal limits   TROPONIN       All other labs were within normal range or not returned as of this dictation. EMERGENCY DEPARTMENT COURSE and DIFFERENTIAL DIAGNOSIS/MDM:   Vitals:    Vitals:    08/19/19 1528 08/19/19 1559 08/19/19 1615 08/19/19 1830   BP: (!) 177/100 (!) 137/95 133/88 (!) 134/90   Pulse: 88   84   Resp: 18      Temp: 98.4 °F (36.9 °C)      TempSrc: Oral      SpO2: 98%      Weight: 256 lb (116.1 kg)      Height: 6' 4\" (1.93 m)          Patient came in with concerns about his blood pressure. First blood pressure appeared elevated but once he calm down it was 134/90. Patient has a lot of anxiety.   Patient can go back to rehab    MDM      REASSESSMENT          CRITICAL CARE TIME   Total Critical Care time was 0 minutes, excluding separatelyreportable procedures. There was a high probability ofclinically significant/life threatening deterioration in the patient's condition which required my urgent intervention. CONSULTS:  None    PROCEDURES:  Unless otherwise noted below, none     Procedures    FINAL IMPRESSION      1. Essential hypertension    2. Anxiety state          DISPOSITION/PLAN   DISPOSITION Discharge - Pending Orders Complete 08/19/2019 05:28:00 PM      PATIENT REFERREDTO:  Kena Garcia MD  [id number] [phone]      As needed      DISCHARGEMEDICATIONS:  New Prescriptions    No medications on file     Controlled Substances Monitoring:     No flowsheet data found.     (Please note that portions of this note were completed with a voice recognition program.  Efforts were made to edit the dictations but occasionally words are mis-transcribed.)    Lakia Espinoza DO (electronically signed)  Attending Emergency Physician          Lakia Espinoza DO  08/19/19 0711

## 2019-08-21 PROCEDURE — 93010 ELECTROCARDIOGRAM REPORT: CPT | Performed by: INTERNAL MEDICINE

## 2019-08-27 ENCOUNTER — OFFICE VISIT (OUTPATIENT)
Dept: FAMILY MEDICINE CLINIC | Age: 39
End: 2019-08-27
Payer: COMMERCIAL

## 2019-08-27 VITALS
TEMPERATURE: 98.9 F | HEART RATE: 89 BPM | SYSTOLIC BLOOD PRESSURE: 140 MMHG | OXYGEN SATURATION: 99 % | DIASTOLIC BLOOD PRESSURE: 102 MMHG

## 2019-08-27 DIAGNOSIS — I10 ESSENTIAL HYPERTENSION: Chronic | ICD-10-CM

## 2019-08-27 DIAGNOSIS — M54.2 NECK PAIN: Primary | ICD-10-CM

## 2019-08-27 PROCEDURE — 99214 OFFICE O/P EST MOD 30 MIN: CPT | Performed by: INTERNAL MEDICINE

## 2019-08-27 RX ORDER — FLUTICASONE PROPIONATE 50 MCG
1 SPRAY, SUSPENSION (ML) NASAL DAILY
Qty: 1 BOTTLE | Refills: 1 | Status: SHIPPED | OUTPATIENT
Start: 2019-08-27 | End: 2019-09-28 | Stop reason: SDUPTHER

## 2019-08-27 RX ORDER — IBUPROFEN 800 MG/1
800 TABLET ORAL EVERY 6 HOURS PRN
Qty: 20 TABLET | Refills: 0 | Status: SHIPPED | OUTPATIENT
Start: 2019-08-27 | End: 2021-05-11

## 2019-08-27 ASSESSMENT — ENCOUNTER SYMPTOMS
EYE PAIN: 0
FACIAL SWELLING: 0
APNEA: 0
VOMITING: 0
BACK PAIN: 0
SHORTNESS OF BREATH: 0
NAUSEA: 0
BLOOD IN STOOL: 0
WHEEZING: 0
SINUS PAIN: 0
CHEST TIGHTNESS: 0
COLOR CHANGE: 0
CONSTIPATION: 0
EYE REDNESS: 0
SINUS PRESSURE: 0
VOICE CHANGE: 0
TROUBLE SWALLOWING: 0
ABDOMINAL DISTENTION: 0
EYE DISCHARGE: 0
RECTAL PAIN: 0
RHINORRHEA: 0
COUGH: 0
EYE ITCHING: 0
PHOTOPHOBIA: 0
ABDOMINAL PAIN: 0
SORE THROAT: 0
DIARRHEA: 0

## 2019-08-27 NOTE — PROGRESS NOTES
Review of Systems   Constitutional: Negative for chills, diaphoresis, fatigue and fever. HENT: Negative for congestion, dental problem, drooling, ear discharge, ear pain, facial swelling, hearing loss, mouth sores, nosebleeds, postnasal drip, rhinorrhea, sinus pressure, sinus pain, sneezing, sore throat, tinnitus, trouble swallowing and voice change. Neck pain   Eyes: Negative for photophobia, pain, discharge, redness, itching and visual disturbance. Respiratory: Negative for apnea, cough, chest tightness, shortness of breath and wheezing. Cardiovascular: Negative for chest pain, palpitations and leg swelling. Gastrointestinal: Negative for abdominal distention, abdominal pain, blood in stool, constipation, diarrhea, nausea, rectal pain and vomiting. Endocrine: Negative for cold intolerance, heat intolerance, polydipsia, polyphagia and polyuria. Genitourinary: Negative for decreased urine volume, difficulty urinating, dysuria, flank pain, frequency, genital sores, hematuria and urgency. Musculoskeletal: Negative for arthralgias, back pain, gait problem, joint swelling, myalgias, neck pain and neck stiffness. Skin: Negative for color change, rash and wound. Allergic/Immunologic: Negative for environmental allergies and food allergies. Neurological: Negative for dizziness, tremors, seizures, syncope, facial asymmetry, speech difficulty, weakness, light-headedness, numbness and headaches. Hematological: Negative for adenopathy. Does not bruise/bleed easily. Psychiatric/Behavioral: Negative for agitation, confusion, decreased concentration, hallucinations, self-injury, sleep disturbance and suicidal ideas. The patient is not nervous/anxious. Objective:   BP (!) 140/102 (Site: Right Upper Arm, Position: Sitting, Cuff Size: Large Adult)   Pulse 89   Temp 98.9 °F (37.2 °C) (Oral)   SpO2 99%     Physical Exam   Constitutional: He is oriented to person, place, and time.

## 2019-09-10 ENCOUNTER — OFFICE VISIT (OUTPATIENT)
Dept: FAMILY MEDICINE CLINIC | Age: 39
End: 2019-09-10
Payer: COMMERCIAL

## 2019-09-10 VITALS
TEMPERATURE: 98.4 F | OXYGEN SATURATION: 98 % | DIASTOLIC BLOOD PRESSURE: 86 MMHG | HEART RATE: 80 BPM | SYSTOLIC BLOOD PRESSURE: 114 MMHG

## 2019-09-10 DIAGNOSIS — I10 ESSENTIAL HYPERTENSION: ICD-10-CM

## 2019-09-10 DIAGNOSIS — D75.89 MACROCYTOSIS: ICD-10-CM

## 2019-09-10 DIAGNOSIS — R74.01 TRANSAMINITIS: ICD-10-CM

## 2019-09-10 DIAGNOSIS — R74.01 TRANSAMINITIS: Primary | ICD-10-CM

## 2019-09-10 LAB
ALBUMIN SERPL-MCNC: 4.6 G/DL (ref 3.5–4.6)
ALP BLD-CCNC: 91 U/L (ref 35–104)
ALT SERPL-CCNC: 107 U/L (ref 0–41)
AST SERPL-CCNC: 37 U/L (ref 0–40)
BILIRUB SERPL-MCNC: 0.3 MG/DL (ref 0.2–0.7)
BILIRUBIN DIRECT: <0.2 MG/DL (ref 0–0.4)
BILIRUBIN, INDIRECT: ABNORMAL MG/DL (ref 0–0.6)
FOLATE: 7.1 NG/ML (ref 7.3–26.1)
TOTAL PROTEIN: 7.2 G/DL (ref 6.3–8)
VITAMIN B-12: 406 PG/ML (ref 232–1245)

## 2019-09-10 PROCEDURE — 99214 OFFICE O/P EST MOD 30 MIN: CPT | Performed by: INTERNAL MEDICINE

## 2019-09-10 RX ORDER — GABAPENTIN 100 MG/1
CAPSULE ORAL
Refills: 0 | COMMUNITY
Start: 2019-09-02 | End: 2019-09-13 | Stop reason: SDUPTHER

## 2019-09-10 RX ORDER — METHOCARBAMOL 500 MG/1
750 TABLET, FILM COATED ORAL 4 TIMES DAILY
Qty: 60 TABLET | Refills: 0 | Status: SHIPPED | OUTPATIENT
Start: 2019-09-10 | End: 2019-09-23 | Stop reason: SDUPTHER

## 2019-09-10 RX ORDER — AMLODIPINE BESYLATE 5 MG/1
TABLET ORAL
Refills: 0 | COMMUNITY
Start: 2019-09-02 | End: 2019-09-25 | Stop reason: SDUPTHER

## 2019-09-10 RX ORDER — MIRTAZAPINE 15 MG/1
TABLET, FILM COATED ORAL
Qty: 15 TABLET | Refills: 0 | Status: SHIPPED | OUTPATIENT
Start: 2019-09-10 | End: 2019-09-27 | Stop reason: SDUPTHER

## 2019-09-10 RX ORDER — MIRTAZAPINE 15 MG/1
TABLET, FILM COATED ORAL
Refills: 0 | COMMUNITY
Start: 2019-09-02 | End: 2019-09-10 | Stop reason: SDUPTHER

## 2019-09-10 RX ORDER — METHOCARBAMOL 500 MG/1
750 TABLET, FILM COATED ORAL 4 TIMES DAILY
Qty: 60 TABLET | Refills: 0 | Status: SHIPPED | OUTPATIENT
Start: 2019-09-10 | End: 2019-09-10 | Stop reason: CLARIF

## 2019-09-10 ASSESSMENT — ENCOUNTER SYMPTOMS
BACK PAIN: 0
RECTAL PAIN: 0
ABDOMINAL DISTENTION: 0
PHOTOPHOBIA: 0
SORE THROAT: 0
EYE ITCHING: 0
FACIAL SWELLING: 0
APNEA: 0
VOICE CHANGE: 0
COLOR CHANGE: 0
EYE REDNESS: 0
ABDOMINAL PAIN: 0
DIARRHEA: 0
TROUBLE SWALLOWING: 0
EYE DISCHARGE: 0
SINUS PRESSURE: 0
RHINORRHEA: 0
BLOOD IN STOOL: 0
SHORTNESS OF BREATH: 0
CONSTIPATION: 0
NAUSEA: 0
CHEST TIGHTNESS: 0
WHEEZING: 0
COUGH: 0
SINUS PAIN: 0
VOMITING: 0
EYE PAIN: 0

## 2019-09-10 NOTE — PROGRESS NOTES
onto the skin daily for 14 days 14 patch 5    citalopram (CELEXA) 20 MG tablet Take 1 tablet by mouth daily 30 tablet 0     No current facility-administered medications on file prior to visit. Review of Systems   Constitutional: Positive for fatigue. Negative for chills, diaphoresis and fever. HENT: Negative for congestion, dental problem, drooling, ear discharge, ear pain, facial swelling, hearing loss, mouth sores, nosebleeds, postnasal drip, rhinorrhea, sinus pressure, sinus pain, sneezing, sore throat, tinnitus, trouble swallowing and voice change. Eyes: Negative for photophobia, pain, discharge, redness, itching and visual disturbance. Respiratory: Negative for apnea, cough, chest tightness, shortness of breath and wheezing. Cardiovascular: Negative for chest pain, palpitations and leg swelling. Gastrointestinal: Negative for abdominal distention, abdominal pain, blood in stool, constipation, diarrhea, nausea, rectal pain and vomiting. Endocrine: Negative for cold intolerance, heat intolerance, polydipsia, polyphagia and polyuria. Genitourinary: Negative for decreased urine volume, difficulty urinating, dysuria, flank pain, frequency, genital sores, hematuria and urgency. Musculoskeletal: Negative for arthralgias, back pain, gait problem, joint swelling, myalgias, neck pain and neck stiffness. Skin: Negative for color change, rash and wound. Allergic/Immunologic: Negative for environmental allergies and food allergies. Neurological: Negative for dizziness, tremors, seizures, syncope, facial asymmetry, speech difficulty, weakness, light-headedness, numbness and headaches. Hematological: Negative for adenopathy. Does not bruise/bleed easily. Psychiatric/Behavioral: Negative for agitation, confusion, decreased concentration, hallucinations, self-injury, sleep disturbance and suicidal ideas. The patient is not nervous/anxious.         Objective:   /86 (Site: Right Upper Arm, Position: Sitting, Cuff Size: Large Adult)   Pulse 80   Temp 98.4 °F (36.9 °C) (Oral)   SpO2 98%     Physical Exam   Constitutional: He is oriented to person, place, and time. He appears well-developed and well-nourished. No distress. HENT:   Head: Normocephalic. Right Ear: External ear normal.   Left Ear: External ear normal.   Eyes: Conjunctivae are normal.   Neck: Neck supple. No tracheal deviation present. Cardiovascular: Normal rate, regular rhythm and normal heart sounds. Pulmonary/Chest: Effort normal and breath sounds normal. No respiratory distress. He has no wheezes. He has no rales. He exhibits no tenderness. Abdominal: Soft. Bowel sounds are normal. He exhibits no distension and no mass. There is no tenderness. There is no guarding. Musculoskeletal: He exhibits no edema, tenderness or deformity. Neurological: He is alert and oriented to person, place, and time. Coordination normal.   Skin: Skin is warm and dry. No rash noted. No erythema. No pallor. Psychiatric: He has a normal mood and affect. Judgment and thought content normal.       Assessment:       Diagnosis Orders   1. Transaminitis  Hepatic Function Panel   2. Macrocytosis  Vitamin B12 & Folate   3. Essential hypertension           Plan:      [facility] was seen today for hypertension and alcohol problem. Diagnoses and all orders for this visit:    Transaminitis  -     Hepatic Function Panel; Future    Macrocytosis  -     Vitamin B12 & Folate; Future    Essential hypertension-Norvasc 5 mg orally daily, lisinopril 10 mg orally daily, and chlorthalidone 25 mg orally daily. Other orders  -     methocarbamol (ROBAXIN) 500 MG tablet; Take 1.5 tablets by mouth 4 times daily for 10 days  -     mirtazapine (REMERON) 15 MG tablet; TAKE 1 TABLET BY MOUTH EVERY DAY AT BEDTIME         Return in about 2 weeks (around 9/24/2019).       Mack Menon MD    Please note, this report has been partially produced using speech recognition

## 2019-09-13 DIAGNOSIS — I10 ESSENTIAL HYPERTENSION: Chronic | ICD-10-CM

## 2019-09-13 NOTE — TELEPHONE ENCOUNTER
Pt S/O requesting medication refill.      Rx requested:  Requested Prescriptions     Pending Prescriptions Disp Refills    chlorthalidone (HYGROTON) 25 MG tablet 30 tablet 0     Sig: Take 1 tablet by mouth daily    citalopram (CELEXA) 20 MG tablet 30 tablet 0     Sig: Take 1 tablet by mouth daily    lisinopril (PRINIVIL;ZESTRIL) 10 MG tablet 30 tablet 0     Sig: Take 1 tablet by mouth daily    cloNIDine (CATAPRES) 0.1 MG tablet 120 tablet 0     Sig: Take 1 tablet by mouth every 6 hours    gabapentin (NEURONTIN) 100 MG capsule 90 capsule 0     Sig: TAKE 2 CAPSULES BY MOUTH THREE TIMES DAILY       Last Office Visit:   9/10/2019    Last Tox screen:    NONE     Last Medication contract:    NONE     Next Visit Date:  Future Appointments   Date Time Provider [place]   9/26/2019  6:45 PM Latrelle Castleman,  [place], [address]

## 2019-09-15 RX ORDER — CLONIDINE HYDROCHLORIDE 0.1 MG/1
0.1 TABLET ORAL EVERY 6 HOURS
Qty: 120 TABLET | Refills: 0 | Status: SHIPPED | OUTPATIENT
Start: 2019-09-15 | End: 2019-09-26 | Stop reason: SDUPTHER

## 2019-09-15 RX ORDER — GABAPENTIN 100 MG/1
CAPSULE ORAL
Qty: 90 CAPSULE | Refills: 0 | Status: SHIPPED | OUTPATIENT
Start: 2019-09-15 | End: 2019-09-26 | Stop reason: SDUPTHER

## 2019-09-15 RX ORDER — LISINOPRIL 10 MG/1
10 TABLET ORAL DAILY
Qty: 90 TABLET | Refills: 3 | Status: SHIPPED | OUTPATIENT
Start: 2019-09-15 | End: 2019-09-26 | Stop reason: SDUPTHER

## 2019-09-15 RX ORDER — CHLORTHALIDONE 25 MG/1
25 TABLET ORAL DAILY
Qty: 90 TABLET | Refills: 3 | Status: SHIPPED | OUTPATIENT
Start: 2019-09-15 | End: 2019-09-26 | Stop reason: SDUPTHER

## 2019-09-15 RX ORDER — CITALOPRAM 20 MG/1
20 TABLET ORAL DAILY
Qty: 90 TABLET | Refills: 3 | Status: SHIPPED | OUTPATIENT
Start: 2019-09-15 | End: 2019-09-26 | Stop reason: SDUPTHER

## 2019-09-23 RX ORDER — METHOCARBAMOL 500 MG/1
750 TABLET, FILM COATED ORAL 4 TIMES DAILY
Qty: 60 TABLET | Refills: 0 | Status: SHIPPED | OUTPATIENT
Start: 2019-09-23 | End: 2019-10-11 | Stop reason: SDUPTHER

## 2019-09-25 ENCOUNTER — OFFICE VISIT (OUTPATIENT)
Dept: FAMILY MEDICINE CLINIC | Age: 39
End: 2019-09-25
Payer: COMMERCIAL

## 2019-09-25 VITALS
HEIGHT: 76 IN | BODY MASS INDEX: 31.05 KG/M2 | DIASTOLIC BLOOD PRESSURE: 90 MMHG | SYSTOLIC BLOOD PRESSURE: 122 MMHG | TEMPERATURE: 98.5 F | OXYGEN SATURATION: 99 % | HEART RATE: 62 BPM | WEIGHT: 255 LBS

## 2019-09-25 DIAGNOSIS — M79.671 RIGHT FOOT PAIN: ICD-10-CM

## 2019-09-25 DIAGNOSIS — I10 ESSENTIAL HYPERTENSION: Primary | Chronic | ICD-10-CM

## 2019-09-25 PROCEDURE — G8427 DOCREV CUR MEDS BY ELIG CLIN: HCPCS | Performed by: INTERNAL MEDICINE

## 2019-09-25 PROCEDURE — 4004F PT TOBACCO SCREEN RCVD TLK: CPT | Performed by: INTERNAL MEDICINE

## 2019-09-25 PROCEDURE — 99214 OFFICE O/P EST MOD 30 MIN: CPT | Performed by: INTERNAL MEDICINE

## 2019-09-25 PROCEDURE — G8417 CALC BMI ABV UP PARAM F/U: HCPCS | Performed by: INTERNAL MEDICINE

## 2019-09-25 RX ORDER — AMLODIPINE BESYLATE 5 MG/1
TABLET ORAL
Qty: 45 TABLET | Refills: 5 | Status: SHIPPED | OUTPATIENT
Start: 2019-09-25 | End: 2020-06-07

## 2019-09-25 ASSESSMENT — ENCOUNTER SYMPTOMS
TROUBLE SWALLOWING: 0
CONSTIPATION: 0
RHINORRHEA: 0
WHEEZING: 0
RECTAL PAIN: 0
CHEST TIGHTNESS: 0
ABDOMINAL DISTENTION: 0
DIARRHEA: 0
NAUSEA: 0
APNEA: 0
EYE REDNESS: 0
PHOTOPHOBIA: 0
COUGH: 0
VOMITING: 0
SORE THROAT: 0
COLOR CHANGE: 0
BLOOD IN STOOL: 0
SINUS PRESSURE: 0
BACK PAIN: 0
ABDOMINAL PAIN: 0
EYE DISCHARGE: 0
FACIAL SWELLING: 0
EYE ITCHING: 0
SINUS PAIN: 0
EYE PAIN: 0
VOICE CHANGE: 0
SHORTNESS OF BREATH: 0

## 2019-09-25 NOTE — PROGRESS NOTES
Medical: Not on file     Non-medical: Not on file   Tobacco Use    Smoking status: Current Every Day Smoker     Packs/day: 1.00     Years: 20.00     Pack years: 20.00     Types: Cigarettes    Smokeless tobacco: Never Used   Substance and Sexual Activity    Alcohol use: Not Currently     Alcohol/week: 6.0 standard drinks     Types: 6 Cans of beer per week     Comment: 6 pkg per day    Drug use: Yes     Types: Marijuana     Comment: 2-3 x per week    Sexual activity: Not on file   Lifestyle    Physical activity:     Days per week: Not on file     Minutes per session: Not on file    Stress: Not on file   Relationships    Social connections:     Talks on phone: Not on file     Gets together: Not on file     Attends Zoroastrian service: Not on file     Active member of club or organization: Not on file     Attends meetings of clubs or organizations: Not on file     Relationship status: Not on file    Intimate partner violence:     Fear of current or ex partner: Not on file     Emotionally abused: Not on file     Physically abused: Not on file     Forced sexual activity: Not on file   Other Topics Concern    Not on file   Social History Narrative    Not on file     Family History   Problem Relation Age of Onset    High Blood Pressure Mother     Heart Disease Mother     Other Mother         copd / smoker    Stroke Father     High Blood Pressure Brother     Other Brother         anxiety     No Known Allergies  Current Outpatient Medications on File Prior to Visit   Medication Sig Dispense Refill    methocarbamol (ROBAXIN) 500 MG tablet Take 1.5 tablets by mouth 4 times daily for 10 days 60 tablet 0    chlorthalidone (HYGROTON) 25 MG tablet Take 1 tablet by mouth daily 90 tablet 3    citalopram (CELEXA) 20 MG tablet Take 1 tablet by mouth daily 90 tablet 3    lisinopril (PRINIVIL;ZESTRIL) 10 MG tablet Take 1 tablet by mouth daily 90 tablet 3    cloNIDine (CATAPRES) 0.1 MG tablet Take 1 tablet by mouth

## 2019-09-26 ENCOUNTER — PATIENT MESSAGE (OUTPATIENT)
Dept: FAMILY MEDICINE CLINIC | Age: 39
End: 2019-09-26

## 2019-09-26 DIAGNOSIS — I10 ESSENTIAL HYPERTENSION: Chronic | ICD-10-CM

## 2019-09-26 RX ORDER — FLUTICASONE PROPIONATE 50 MCG
1 SPRAY, SUSPENSION (ML) NASAL DAILY
Qty: 1 BOTTLE | Refills: 1 | Status: CANCELLED | OUTPATIENT
Start: 2019-09-26

## 2019-09-26 RX ORDER — MIRTAZAPINE 15 MG/1
TABLET, FILM COATED ORAL
Qty: 15 TABLET | Refills: 0 | Status: CANCELLED | OUTPATIENT
Start: 2019-09-26

## 2019-09-26 NOTE — TELEPHONE ENCOUNTER
From: Maureen Anthony  To: Yvrose Rawls MD  Sent: 9/26/2019 12:18 PM EDT  Subject: Prescription Question    I was needing my remeron 15 mg up if possible I forgot to ask you when I was there thank you

## 2019-09-27 ENCOUNTER — TELEPHONE (OUTPATIENT)
Dept: FAMILY MEDICINE CLINIC | Age: 39
End: 2019-09-27

## 2019-09-27 RX ORDER — MIRTAZAPINE 15 MG/1
TABLET, FILM COATED ORAL
Qty: 15 TABLET | Refills: 0 | Status: SHIPPED | OUTPATIENT
Start: 2019-09-27 | End: 2019-10-11 | Stop reason: SDUPTHER

## 2019-09-28 RX ORDER — CITALOPRAM 20 MG/1
20 TABLET ORAL DAILY
Qty: 90 TABLET | Refills: 0 | Status: SHIPPED | OUTPATIENT
Start: 2019-09-28 | End: 2020-01-06 | Stop reason: SDUPTHER

## 2019-09-28 RX ORDER — CLONIDINE HYDROCHLORIDE 0.1 MG/1
0.1 TABLET ORAL EVERY 6 HOURS
Qty: 120 TABLET | Refills: 0 | Status: SHIPPED | OUTPATIENT
Start: 2019-09-28 | End: 2019-11-01 | Stop reason: SDUPTHER

## 2019-09-28 RX ORDER — CHLORTHALIDONE 25 MG/1
25 TABLET ORAL DAILY
Qty: 90 TABLET | Refills: 0 | Status: SHIPPED | OUTPATIENT
Start: 2019-09-28 | End: 2020-01-20 | Stop reason: SDUPTHER

## 2019-09-28 RX ORDER — LISINOPRIL 10 MG/1
10 TABLET ORAL DAILY
Qty: 90 TABLET | Refills: 0 | Status: SHIPPED | OUTPATIENT
Start: 2019-09-28 | End: 2020-01-16 | Stop reason: SDUPTHER

## 2019-09-28 RX ORDER — GABAPENTIN 100 MG/1
CAPSULE ORAL
Qty: 90 CAPSULE | Refills: 0 | Status: SHIPPED | OUTPATIENT
Start: 2019-09-28 | End: 2019-10-11 | Stop reason: SDUPTHER

## 2019-09-28 RX ORDER — FLUTICASONE PROPIONATE 50 MCG
1 SPRAY, SUSPENSION (ML) NASAL DAILY
Qty: 1 BOTTLE | Refills: 1 | Status: SHIPPED | OUTPATIENT
Start: 2019-09-28 | End: 2022-09-19

## 2019-10-03 ENCOUNTER — TELEPHONE (OUTPATIENT)
Dept: FAMILY MEDICINE CLINIC | Age: 39
End: 2019-10-03

## 2019-10-14 RX ORDER — MIRTAZAPINE 15 MG/1
TABLET, FILM COATED ORAL
Qty: 15 TABLET | Refills: 0 | Status: SHIPPED | OUTPATIENT
Start: 2019-10-14 | End: 2019-11-06 | Stop reason: SDUPTHER

## 2019-10-14 RX ORDER — ATORVASTATIN CALCIUM 20 MG/1
20 TABLET, FILM COATED ORAL DAILY
Qty: 30 TABLET | Refills: 3 | OUTPATIENT
Start: 2019-10-14 | End: 2020-02-11

## 2019-10-14 RX ORDER — METHOCARBAMOL 500 MG/1
TABLET, FILM COATED ORAL
Qty: 60 TABLET | Refills: 0 | Status: SHIPPED | OUTPATIENT
Start: 2019-10-14 | End: 2019-10-24 | Stop reason: SDUPTHER

## 2019-10-24 RX ORDER — METHOCARBAMOL 500 MG/1
TABLET, FILM COATED ORAL
Qty: 60 TABLET | Refills: 0 | Status: SHIPPED | OUTPATIENT
Start: 2019-10-24 | End: 2019-11-04 | Stop reason: SDUPTHER

## 2019-10-24 RX ORDER — MIRTAZAPINE 15 MG/1
TABLET, FILM COATED ORAL
Qty: 15 TABLET | Refills: 0 | Status: SHIPPED | OUTPATIENT
Start: 2019-10-24 | End: 2020-03-11 | Stop reason: SDUPTHER

## 2019-11-01 RX ORDER — CLONIDINE HYDROCHLORIDE 0.1 MG/1
0.1 TABLET ORAL EVERY 6 HOURS
Qty: 120 TABLET | Refills: 0 | Status: SHIPPED | OUTPATIENT
Start: 2019-11-01 | End: 2019-12-03 | Stop reason: SDUPTHER

## 2019-11-04 RX ORDER — METHOCARBAMOL 500 MG/1
TABLET, FILM COATED ORAL
Qty: 60 TABLET | Refills: 0 | Status: SHIPPED | OUTPATIENT
Start: 2019-11-04 | End: 2019-11-14 | Stop reason: SDUPTHER

## 2019-11-14 RX ORDER — METHOCARBAMOL 500 MG/1
TABLET, FILM COATED ORAL
Qty: 60 TABLET | Refills: 0 | Status: SHIPPED | OUTPATIENT
Start: 2019-11-14 | End: 2019-11-15

## 2019-11-15 RX ORDER — METHOCARBAMOL 500 MG/1
TABLET, FILM COATED ORAL
Qty: 60 TABLET | Refills: 0 | Status: SHIPPED | OUTPATIENT
Start: 2019-11-15 | End: 2019-11-24 | Stop reason: SDUPTHER

## 2019-11-15 RX ORDER — GABAPENTIN 100 MG/1
CAPSULE ORAL
Qty: 90 CAPSULE | Refills: 0 | Status: SHIPPED | OUTPATIENT
Start: 2019-11-15 | End: 2020-01-06 | Stop reason: SDUPTHER

## 2019-11-25 RX ORDER — METHOCARBAMOL 500 MG/1
TABLET, FILM COATED ORAL
Qty: 60 TABLET | Refills: 0 | Status: SHIPPED | OUTPATIENT
Start: 2019-11-25 | End: 2019-12-03 | Stop reason: SDUPTHER

## 2019-11-25 RX ORDER — MIRTAZAPINE 15 MG/1
TABLET, FILM COATED ORAL
Qty: 30 TABLET | Refills: 5 | Status: SHIPPED | OUTPATIENT
Start: 2019-11-25 | End: 2020-02-10

## 2019-12-03 RX ORDER — CLONIDINE HYDROCHLORIDE 0.1 MG/1
0.1 TABLET ORAL EVERY 6 HOURS
Qty: 120 TABLET | Refills: 0 | Status: SHIPPED | OUTPATIENT
Start: 2019-12-03 | End: 2020-02-10 | Stop reason: ALTCHOICE

## 2019-12-03 RX ORDER — METHOCARBAMOL 500 MG/1
TABLET, FILM COATED ORAL
Qty: 60 TABLET | Refills: 0 | Status: SHIPPED | OUTPATIENT
Start: 2019-12-03 | End: 2020-01-06 | Stop reason: SDUPTHER

## 2020-01-06 RX ORDER — METHOCARBAMOL 500 MG/1
TABLET, FILM COATED ORAL
Qty: 60 TABLET | Refills: 0 | Status: SHIPPED | OUTPATIENT
Start: 2020-01-06 | End: 2020-02-05 | Stop reason: SDUPTHER

## 2020-01-06 RX ORDER — GABAPENTIN 100 MG/1
CAPSULE ORAL
Qty: 90 CAPSULE | Refills: 0 | Status: SHIPPED | OUTPATIENT
Start: 2020-01-06 | End: 2020-02-05 | Stop reason: SDUPTHER

## 2020-01-06 RX ORDER — CITALOPRAM 20 MG/1
20 TABLET ORAL DAILY
Qty: 90 TABLET | Refills: 0 | Status: SHIPPED | OUTPATIENT
Start: 2020-01-06 | End: 2020-01-16 | Stop reason: SDUPTHER

## 2020-01-16 RX ORDER — LISINOPRIL 10 MG/1
TABLET ORAL
Qty: 90 TABLET | Refills: 0 | OUTPATIENT
Start: 2020-01-16

## 2020-01-16 RX ORDER — CHLORTHALIDONE 25 MG/1
25 TABLET ORAL DAILY
Qty: 90 TABLET | Refills: 0 | Status: CANCELLED | OUTPATIENT
Start: 2020-01-16 | End: 2020-02-15

## 2020-01-16 NOTE — TELEPHONE ENCOUNTER
Pharmacy is requesting medication refill. Please approve or deny this request.    Rx requested:  Requested Prescriptions     Pending Prescriptions Disp Refills    chlorthalidone (HYGROTON) 25 MG tablet 90 tablet 0     Sig: Take 1 tablet by mouth daily     Refused Prescriptions Disp Refills    lisinopril (PRINIVIL;ZESTRIL) 10 MG tablet [Pharmacy Med Name: LISINOPRIL 10 MG TABLET] 90 tablet 0     Sig: TAKE 1 TABLET BY MOUTH EVERY DAY     Refused By: Jake Sung     Reason for Refusal: Duplicate Request         Last Office Visit:   9/25/2019      Next Visit Date:  No future appointments.

## 2020-01-20 RX ORDER — CHLORTHALIDONE 25 MG/1
25 TABLET ORAL DAILY
Qty: 90 TABLET | Refills: 0 | Status: SHIPPED | OUTPATIENT
Start: 2020-01-20 | End: 2020-04-10

## 2020-01-20 RX ORDER — LISINOPRIL 10 MG/1
10 TABLET ORAL DAILY
Qty: 90 TABLET | Refills: 0 | Status: SHIPPED | OUTPATIENT
Start: 2020-01-20 | End: 2020-04-10

## 2020-01-20 RX ORDER — CITALOPRAM 20 MG/1
20 TABLET ORAL DAILY
Qty: 90 TABLET | Refills: 0 | Status: SHIPPED | OUTPATIENT
Start: 2020-01-20 | End: 2020-03-11 | Stop reason: SDUPTHER

## 2020-01-23 ENCOUNTER — PATIENT MESSAGE (OUTPATIENT)
Dept: FAMILY MEDICINE CLINIC | Age: 40
End: 2020-01-23

## 2020-01-27 RX ORDER — ATORVASTATIN CALCIUM 20 MG/1
TABLET, FILM COATED ORAL
COMMUNITY
Start: 2019-12-10 | End: 2020-01-27 | Stop reason: SDUPTHER

## 2020-01-27 NOTE — TELEPHONE ENCOUNTER
From: Bill Lee  To: Fantasma Bains MD  Sent: 1/23/2020 11:35 AM EST  Subject: Prescription Question    I was needing my lipitor 20mg I been out of this medication

## 2020-01-27 NOTE — TELEPHONE ENCOUNTER
Patient  requesting medication refill. Please approve or deny this request.    Rx requested:  Requested Prescriptions     Pending Prescriptions Disp Refills    atorvastatin (LIPITOR) 20 MG tablet 30 tablet          Last Office Visit:   9/25/2019      Next Visit Date:  No future appointments.

## 2020-01-28 RX ORDER — ATORVASTATIN CALCIUM 20 MG/1
TABLET, FILM COATED ORAL
Qty: 30 TABLET | Refills: 0 | Status: SHIPPED | OUTPATIENT
Start: 2020-01-28 | End: 2020-03-01

## 2020-02-04 NOTE — TELEPHONE ENCOUNTER
Pharmacy is requesting medication refill. Please approve or deny this request.    Rx requested:  Requested Prescriptions     Pending Prescriptions Disp Refills    gabapentin (NEURONTIN) 100 MG capsule 90 capsule 0     Sig: TAKE 2 CAPSULES BY MOUTH THREE TIMES DAILY         Last Office Visit:   9/25/2019      Next Visit Date:  No future appointments.

## 2020-02-05 RX ORDER — GABAPENTIN 100 MG/1
CAPSULE ORAL
Qty: 90 CAPSULE | Refills: 0 | Status: SHIPPED | OUTPATIENT
Start: 2020-02-05 | End: 2020-04-24

## 2020-02-05 RX ORDER — METHOCARBAMOL 500 MG/1
TABLET, FILM COATED ORAL
Qty: 60 TABLET | Refills: 0 | Status: SHIPPED | OUTPATIENT
Start: 2020-02-05 | End: 2020-03-11 | Stop reason: SDUPTHER

## 2020-02-05 NOTE — TELEPHONE ENCOUNTER
Rx requested:  Requested Prescriptions     Pending Prescriptions Disp Refills    methocarbamol (ROBAXIN) 500 MG tablet 60 tablet 0     Sig: TAKE 1 & 1/2 (ONE AND ONE-HALF) TABLETS BY MOUTH FOUR TIMES DAILY FOR 10 DAYS       Last Office Visit:   9/25/2019      Next Visit Date:  Future Appointments   Date Time Provider [place]   2/7/2020  8:30 AM Heydi Díaz MD [address] [place] [address] 7

## 2020-02-10 ENCOUNTER — OFFICE VISIT (OUTPATIENT)
Dept: FAMILY MEDICINE CLINIC | Age: 40
End: 2020-02-10
Payer: COMMERCIAL

## 2020-02-10 VITALS
BODY MASS INDEX: 33.23 KG/M2 | OXYGEN SATURATION: 98 % | HEART RATE: 88 BPM | SYSTOLIC BLOOD PRESSURE: 130 MMHG | TEMPERATURE: 97.2 F | WEIGHT: 273 LBS | DIASTOLIC BLOOD PRESSURE: 88 MMHG

## 2020-02-10 PROCEDURE — 4004F PT TOBACCO SCREEN RCVD TLK: CPT | Performed by: INTERNAL MEDICINE

## 2020-02-10 PROCEDURE — 99213 OFFICE O/P EST LOW 20 MIN: CPT | Performed by: INTERNAL MEDICINE

## 2020-02-10 PROCEDURE — G8417 CALC BMI ABV UP PARAM F/U: HCPCS | Performed by: INTERNAL MEDICINE

## 2020-02-10 PROCEDURE — G8482 FLU IMMUNIZE ORDER/ADMIN: HCPCS | Performed by: INTERNAL MEDICINE

## 2020-02-10 PROCEDURE — 90471 IMMUNIZATION ADMIN: CPT | Performed by: INTERNAL MEDICINE

## 2020-02-10 PROCEDURE — G8427 DOCREV CUR MEDS BY ELIG CLIN: HCPCS | Performed by: INTERNAL MEDICINE

## 2020-02-10 PROCEDURE — 90688 IIV4 VACCINE SPLT 0.5 ML IM: CPT | Performed by: INTERNAL MEDICINE

## 2020-02-10 RX ORDER — PANTOPRAZOLE SODIUM 40 MG/1
40 TABLET, DELAYED RELEASE ORAL
Qty: 30 TABLET | Refills: 2 | Status: SHIPPED | OUTPATIENT
Start: 2020-02-10 | End: 2020-05-01

## 2020-02-10 RX ORDER — SIMETHICONE 80 MG
80 TABLET,CHEWABLE ORAL EVERY 8 HOURS PRN
Qty: 180 TABLET | Refills: 3 | Status: SHIPPED | OUTPATIENT
Start: 2020-02-10 | End: 2020-10-01

## 2020-02-10 ASSESSMENT — ENCOUNTER SYMPTOMS
VOICE CHANGE: 0
BACK PAIN: 0
FACIAL SWELLING: 0
CONSTIPATION: 0
EYE ITCHING: 0
EYE DISCHARGE: 0
BLOOD IN STOOL: 0
APNEA: 0
EYE REDNESS: 0
EYE PAIN: 0
NAUSEA: 0
COUGH: 1
DIARRHEA: 0
PHOTOPHOBIA: 0
SINUS PRESSURE: 0
ABDOMINAL PAIN: 0
CHEST TIGHTNESS: 0
SORE THROAT: 0
SHORTNESS OF BREATH: 0
ABDOMINAL DISTENTION: 0
COLOR CHANGE: 0
VOMITING: 0
WHEEZING: 0
RHINORRHEA: 0
RECTAL PAIN: 0
TROUBLE SWALLOWING: 0
SINUS PAIN: 0

## 2020-02-10 NOTE — PROGRESS NOTES
Vaccine Information Sheet, \"Influenza - Inactivated\"  given to Dana Concepcion, or parent/legal guardian of  Dana Concepcion and verbalized understanding. Patient responses:    Have you ever had a reaction to a flu vaccine? No  Are you able to eat eggs without adverse effects? Yes  Do you have any current illness? No  Have you ever had Guillian Baltic Syndrome? No    Flu vaccine given per order. Please see immunization tab.

## 2020-02-10 NOTE — PROGRESS NOTES
Subjective:      Patient ID: Jayla Cartwright is a 35 y.o. male who presents today for:  Chief Complaint   Patient presents with    GI Problem     Patient has been having heart burn, diarrhea, and stomach pain on going ever since six months. Dot Ayon Cough     Admitst to having a cough, jill congestion, sore throat, and headache complaint for a week. Denies post nasal drip and over the counter medication. 59-year-old male with history of hypertension and alcohol abuse presents with a complaint of intermittent belching, bloating and heart burn. Dyspepsia: The patient's a forementioned symptoms have been present for several months      Hypertension:   In regards to his hypertension he has been compliant with chlorthalidone 25 mg orally daily,  Norvasc 5 mg orally daily. However he has not been taking lisinopril 10 mg orally daily. Due to the fact that he ran out of this medication. Influenza vaccination: The patient will request that we administer the influenza vaccination to him at this time. At present he denies polyuria,  Polydipsia, constitutional, sinus, visual, cardiopulmonary, urologic, gastrointestinal, immunologic/hematologic, musculoskeletal, neurologic,dermatologic, or psychiatric complaints.       Past Medical History:   Diagnosis Date    Depression     Hypertension 2015    trx with pills x 1 month / patient did not follow up    Smoker      Past Surgical History:   Procedure Laterality Date    DE CREATE EARDRUM OPENING,GEN ANESTH Bilateral 2/8/2018    BILATERAL MYRINGOTOMY WITH VENTILING TUBE INSERTION (PAT DONE 1/22/18) performed [facility] History     Socioeconomic History    Marital status: Single     Spouse name: Not on file    Number of children: Not on file    Years of education: Not on file    Highest education level: Not on file   Occupational History    Not on file   Social Needs    Financial resource strain: Not on file    Food insecurity:     Worry: Not on file     Inability: Not on file    Transportation needs:     Medical: Not on file     Non-medical: Not on file   Tobacco Use    Smoking status: Current Every Day Smoker     Packs/day: 1.00     Years: 20.00     Pack years: 20.00     Types: Cigarettes    Smokeless tobacco: Never Used   Substance and Sexual Activity    Alcohol use: Not Currently     Alcohol/week: 6.0 standard drinks     Types: 6 Cans of beer per week     Comment: 6 pkg per day    Drug use: Yes     Types: Marijuana     Comment: 2-3 x per week    Sexual activity: Not on file   Lifestyle    Physical activity:     Days per week: Not on file     Minutes per session: Not on file    Stress: Not on file   Relationships    Social connections:     Talks on phone: Not on file     Gets together: Not on file     Attends Restorationism service: Not on file     Active member of club or organization: Not on file     Attends meetings of clubs or organizations: Not on file     Relationship status: Not on file    Intimate partner violence:     Fear of current or ex partner: Not on file     Emotionally abused: Not on file     Physically abused: Not on file     Forced sexual activity: Not on file   Other Topics Concern    Not on file   Social History Narrative    Not on file     Family History   Problem Relation Age of Onset    High Blood Pressure Mother     Heart Disease Mother     Other Mother         copd / smoker    Stroke Father     High Blood Pressure Brother     Other Brother         anxiety     No Known Allergies  Current Outpatient Medications on File Prior to Visit   Medication Sig Dispense Refill    gabapentin (NEURONTIN) 100 MG capsule TAKE 2 CAPSULES BY MOUTH THREE TIMES DAILY 90 capsule 0    methocarbamol (ROBAXIN) 500 MG tablet TAKE 1 & 1/2 (ONE AND ONE-HALF) TABLETS BY MOUTH FOUR TIMES DAILY FOR 10 DAYS 60 tablet 0    atorvastatin (LIPITOR) 20 MG tablet TAKE 1 TABLET BY MOUTH EVERY DAY 30 tablet 0    lisinopril (PRINIVIL;ZESTRIL) 10 MG tablet Take 1 tablet by mouth daily 90 tablet 0    citalopram (CELEXA) 20 MG tablet Take 1 tablet by mouth daily 90 tablet 0    chlorthalidone (HYGROTON) 25 MG tablet Take 1 tablet by mouth daily 90 tablet 0    mirtazapine (REMERON) 15 MG tablet TAKE 1 TABLET BY MOUTH EVERY DAY AT BEDTIME 15 tablet 0    fluticasone (FLONASE) 50 MCG/ACT nasal spray 1 spray by Nasal route daily 1 Bottle 1    amLODIPine (NORVASC) 5 MG tablet take 1 (ONE) and ONE-HALF tablet EVERY DAY 45 tablet 5    ibuprofen (ADVIL;MOTRIN) 800 MG tablet Take 1 tablet by mouth every 6 hours as needed for Pain 20 tablet 0    nicotine (NICODERM CQ) 14 MG/24HR Place 1 patch onto the skin daily for 14 days 14 patch 5     No current facility-administered medications on file prior to visit. Review of Systems   Constitutional: Negative for chills, diaphoresis, fatigue and fever. HENT: Negative for congestion, dental problem, drooling, ear discharge, ear pain, facial swelling, hearing loss, mouth sores, nosebleeds, postnasal drip, rhinorrhea, sinus pressure, sinus pain, sneezing, sore throat, tinnitus, trouble swallowing and voice change. Eyes: Negative for photophobia, pain, discharge, redness, itching and visual disturbance. Respiratory: Positive for cough. Negative for apnea, chest tightness, shortness of breath and wheezing. Cardiovascular: Negative for chest pain, palpitations and leg swelling. Gastrointestinal: Negative for abdominal distention, abdominal pain, blood in stool, constipation, diarrhea, nausea, rectal pain and vomiting. Endocrine: Negative for cold intolerance, heat intolerance, polydipsia, polyphagia and polyuria. Genitourinary: Negative for decreased urine volume, difficulty urinating, dysuria, flank pain, frequency, genital sores, hematuria and urgency.    Musculoskeletal: Negative for arthralgias, back pain, gait problem, joint swelling, myalgias, neck pain and neck stiffness. Skin: Negative for color change, rash and wound. Allergic/Immunologic: Negative for environmental allergies and food allergies. Neurological: Negative for dizziness, tremors, seizures, syncope, facial asymmetry, speech difficulty, weakness, light-headedness, numbness and headaches. Hematological: Negative for adenopathy. Does not bruise/bleed easily. Psychiatric/Behavioral: Negative for agitation, confusion, decreased concentration, hallucinations, self-injury, sleep disturbance and suicidal ideas. The patient is not nervous/anxious. Objective:   /88 (Site: Right Upper Arm, Position: Sitting, Cuff Size: Large Adult)   Pulse 88   Temp 97.2 °F (36.2 °C) (Temporal)   Wt 273 lb (123.8 kg)   SpO2 98%   BMI 33.23 kg/m²     Physical Exam  Constitutional:       General: He is not in acute distress. Appearance: He is well-developed. HENT:      Head: Normocephalic. Right Ear: External ear normal.      Left Ear: External ear normal.   Eyes:      Conjunctiva/sclera: Conjunctivae normal.      Pupils: Pupils are equal, round, and reactive to light. Neck:      Musculoskeletal: Neck supple. Trachea: No tracheal deviation. Cardiovascular:      Rate and Rhythm: Normal rate and regular rhythm. Heart sounds: Normal heart sounds. Pulmonary:      Effort: Pulmonary effort is normal. No respiratory distress. Breath sounds: Normal breath sounds. No wheezing or rales. Chest:      Chest wall: No tenderness. Abdominal:      General: Bowel sounds are normal. There is no distension. Palpations: Abdomen is soft. There is no mass. Tenderness: There is no abdominal tenderness. There is no guarding or rebound. Musculoskeletal:         General: No tenderness or deformity. Skin:     General: Skin is warm and dry. Coloration: Skin is not pale. Findings: No erythema or rash.    Neurological:      Mental Status: He is alert and oriented to

## 2020-03-01 RX ORDER — ATORVASTATIN CALCIUM 20 MG/1
TABLET, FILM COATED ORAL
Qty: 30 TABLET | Refills: 0 | Status: SHIPPED | OUTPATIENT
Start: 2020-03-01 | End: 2020-03-11 | Stop reason: SDUPTHER

## 2020-03-11 RX ORDER — METHOCARBAMOL 500 MG/1
TABLET, FILM COATED ORAL
Qty: 60 TABLET | Refills: 0 | Status: SHIPPED | OUTPATIENT
Start: 2020-03-11 | End: 2020-04-24

## 2020-03-11 RX ORDER — CITALOPRAM 20 MG/1
20 TABLET ORAL DAILY
Qty: 90 TABLET | Refills: 0 | Status: SHIPPED | OUTPATIENT
Start: 2020-03-11 | End: 2020-07-01 | Stop reason: SDUPTHER

## 2020-03-11 RX ORDER — ATORVASTATIN CALCIUM 20 MG/1
TABLET, FILM COATED ORAL
Qty: 30 TABLET | Refills: 0 | Status: SHIPPED | OUTPATIENT
Start: 2020-03-11 | End: 2020-05-01

## 2020-03-11 RX ORDER — MIRTAZAPINE 15 MG/1
TABLET, FILM COATED ORAL
Qty: 15 TABLET | Refills: 0 | Status: SHIPPED | OUTPATIENT
Start: 2020-03-11 | End: 2020-05-01

## 2020-04-10 RX ORDER — CHLORTHALIDONE 25 MG/1
25 TABLET ORAL DAILY
Qty: 90 TABLET | Refills: 0 | Status: SHIPPED | OUTPATIENT
Start: 2020-04-10 | End: 2020-07-20

## 2020-04-10 RX ORDER — LISINOPRIL 10 MG/1
10 TABLET ORAL DAILY
Qty: 90 TABLET | Refills: 0 | Status: SHIPPED | OUTPATIENT
Start: 2020-04-10 | End: 2020-07-20

## 2020-04-24 RX ORDER — GABAPENTIN 100 MG/1
CAPSULE ORAL
Qty: 90 CAPSULE | Refills: 0 | Status: SHIPPED | OUTPATIENT
Start: 2020-04-24 | End: 2021-01-05

## 2020-04-24 RX ORDER — METHOCARBAMOL 500 MG/1
TABLET, FILM COATED ORAL
Qty: 60 TABLET | Refills: 0 | Status: SHIPPED | OUTPATIENT
Start: 2020-04-24 | End: 2021-01-05

## 2020-05-01 RX ORDER — MIRTAZAPINE 15 MG/1
TABLET, FILM COATED ORAL
Qty: 15 TABLET | Refills: 0 | Status: SHIPPED | OUTPATIENT
Start: 2020-05-01 | End: 2020-06-01

## 2020-05-01 RX ORDER — ATORVASTATIN CALCIUM 20 MG/1
TABLET, FILM COATED ORAL
Qty: 30 TABLET | Refills: 0 | Status: SHIPPED | OUTPATIENT
Start: 2020-05-01 | End: 2020-06-01

## 2020-05-01 RX ORDER — PANTOPRAZOLE SODIUM 40 MG/1
TABLET, DELAYED RELEASE ORAL
Qty: 30 TABLET | Refills: 2 | Status: SHIPPED | OUTPATIENT
Start: 2020-05-01 | End: 2020-08-03

## 2020-06-01 RX ORDER — ATORVASTATIN CALCIUM 20 MG/1
TABLET, FILM COATED ORAL
Qty: 30 TABLET | Refills: 0 | Status: SHIPPED | OUTPATIENT
Start: 2020-06-01 | End: 2020-07-01 | Stop reason: SDUPTHER

## 2020-06-01 RX ORDER — MIRTAZAPINE 15 MG/1
TABLET, FILM COATED ORAL
Qty: 15 TABLET | Refills: 0 | Status: SHIPPED | OUTPATIENT
Start: 2020-06-01 | End: 2020-07-07

## 2020-06-07 RX ORDER — AMLODIPINE BESYLATE 5 MG/1
TABLET ORAL
Qty: 45 TABLET | Refills: 5 | Status: SHIPPED | OUTPATIENT
Start: 2020-06-07 | End: 2020-11-27

## 2020-07-01 RX ORDER — CITALOPRAM 20 MG/1
20 TABLET ORAL DAILY
Qty: 90 TABLET | Refills: 0 | Status: SHIPPED | OUTPATIENT
Start: 2020-07-01 | End: 2020-10-05

## 2020-07-01 RX ORDER — ATORVASTATIN CALCIUM 20 MG/1
20 TABLET, FILM COATED ORAL DAILY
Qty: 90 TABLET | Refills: 0 | Status: SHIPPED | OUTPATIENT
Start: 2020-07-01 | End: 2020-08-03

## 2020-07-07 RX ORDER — MIRTAZAPINE 15 MG/1
TABLET, FILM COATED ORAL
Qty: 15 TABLET | Refills: 0 | Status: SHIPPED | OUTPATIENT
Start: 2020-07-07 | End: 2020-08-03

## 2020-07-19 NOTE — TELEPHONE ENCOUNTER
Pharmacy is requesting medication refill. Please approve or deny this request.    Rx requested:  Requested Prescriptions     Pending Prescriptions Disp Refills    chlorthalidone (HYGROTON) 25 MG tablet [Pharmacy Med Name: chlorthalidone 25 mg tablet] 90 tablet 0     Sig: TAKE 1 TABLET BY MOUTH DAILY    lisinopril (PRINIVIL;ZESTRIL) 10 MG tablet [Pharmacy Med Name: lisinopril 10 mg tablet] 90 tablet 0     Sig: TAKE 1 TABLET BY MOUTH DAILY         Last Office Visit:   2/10/2020      Next Visit Date:  No future appointments.

## 2020-07-19 NOTE — TELEPHONE ENCOUNTER
Patient is requesting medication refill. Please approve or deny this request.    Rx requested:  Requested Prescriptions     Pending Prescriptions Disp Refills    chlorthalidone (HYGROTON) 25 MG tablet 90 tablet 0     Sig: Take 1 tablet by mouth daily    lisinopril (PRINIVIL;ZESTRIL) 10 MG tablet 90 tablet 0     Sig: Take 1 tablet by mouth daily         Last Office Visit:   2/10/2020      Next Visit Date:  No future appointments.

## 2020-07-20 RX ORDER — LISINOPRIL 10 MG/1
10 TABLET ORAL DAILY
Qty: 90 TABLET | Refills: 0 | Status: SHIPPED | OUTPATIENT
Start: 2020-07-20 | End: 2021-01-18

## 2020-07-20 RX ORDER — CHLORTHALIDONE 25 MG/1
25 TABLET ORAL DAILY
Qty: 90 TABLET | Refills: 0 | Status: SHIPPED | OUTPATIENT
Start: 2020-07-20 | End: 2020-10-19

## 2020-07-20 RX ORDER — LISINOPRIL 10 MG/1
10 TABLET ORAL DAILY
Qty: 90 TABLET | Refills: 0 | Status: SHIPPED | OUTPATIENT
Start: 2020-07-20 | End: 2020-10-19

## 2020-07-20 RX ORDER — CHLORTHALIDONE 25 MG/1
25 TABLET ORAL DAILY
Qty: 90 TABLET | Refills: 0 | Status: SHIPPED | OUTPATIENT
Start: 2020-07-20 | End: 2021-01-18 | Stop reason: SDUPTHER

## 2020-08-03 RX ORDER — ATORVASTATIN CALCIUM 20 MG/1
TABLET, FILM COATED ORAL
Qty: 90 TABLET | Refills: 0 | Status: SHIPPED | OUTPATIENT
Start: 2020-08-03 | End: 2020-12-27

## 2020-08-03 RX ORDER — MIRTAZAPINE 15 MG/1
TABLET, FILM COATED ORAL
Qty: 15 TABLET | Refills: 0 | Status: SHIPPED | OUTPATIENT
Start: 2020-08-03 | End: 2020-08-31

## 2020-08-03 RX ORDER — PANTOPRAZOLE SODIUM 40 MG/1
TABLET, DELAYED RELEASE ORAL
Qty: 30 TABLET | Refills: 2 | Status: SHIPPED | OUTPATIENT
Start: 2020-08-03 | End: 2020-10-28

## 2020-08-31 RX ORDER — MIRTAZAPINE 15 MG/1
TABLET, FILM COATED ORAL
Qty: 15 TABLET | Refills: 0 | Status: SHIPPED | OUTPATIENT
Start: 2020-08-31 | End: 2020-10-05

## 2020-10-05 RX ORDER — CITALOPRAM 20 MG/1
TABLET ORAL
Qty: 90 TABLET | Refills: 0 | Status: SHIPPED | OUTPATIENT
Start: 2020-10-05 | End: 2020-12-27

## 2020-10-05 RX ORDER — MIRTAZAPINE 15 MG/1
TABLET, FILM COATED ORAL
Qty: 15 TABLET | Refills: 0 | Status: SHIPPED | OUTPATIENT
Start: 2020-10-05 | End: 2020-10-28

## 2020-10-19 RX ORDER — CHLORTHALIDONE 25 MG/1
TABLET ORAL
Qty: 90 TABLET | Refills: 0 | Status: SHIPPED | OUTPATIENT
Start: 2020-10-19 | End: 2021-01-05

## 2020-10-19 RX ORDER — LISINOPRIL 10 MG/1
TABLET ORAL
Qty: 90 TABLET | Refills: 0 | Status: SHIPPED | OUTPATIENT
Start: 2020-10-19 | End: 2021-01-18 | Stop reason: SDUPTHER

## 2020-10-27 NOTE — TELEPHONE ENCOUNTER
Requesting medication refill. Please approve or deny this request.    Rx requested:  Requested Prescriptions     Pending Prescriptions Disp Refills    pantoprazole (PROTONIX) 40 MG tablet [Pharmacy Med Name: pantoprazole 40 mg tablet,delayed release] 30 tablet 2     Sig: TAKE 1 TABLET BY MOUTH EVERY MORNING before BREAKFAST       Last Office Visit:   2/10/2020    Last Filled:      Last Labs:      Next Visit Date:  No future appointments.

## 2020-10-28 RX ORDER — MIRTAZAPINE 15 MG/1
TABLET, FILM COATED ORAL
Qty: 15 TABLET | Refills: 0 | Status: SHIPPED | OUTPATIENT
Start: 2020-10-28 | End: 2021-01-05

## 2020-10-28 RX ORDER — PANTOPRAZOLE SODIUM 40 MG/1
TABLET, DELAYED RELEASE ORAL
Qty: 30 TABLET | Refills: 2 | Status: SHIPPED | OUTPATIENT
Start: 2020-10-28 | End: 2021-01-19

## 2020-12-28 RX ORDER — CITALOPRAM 20 MG/1
TABLET ORAL
Qty: 30 TABLET | Refills: 0 | Status: SHIPPED | OUTPATIENT
Start: 2020-12-28 | End: 2021-01-19

## 2020-12-28 RX ORDER — ATORVASTATIN CALCIUM 20 MG/1
TABLET, FILM COATED ORAL
Qty: 30 TABLET | Refills: 0 | Status: SHIPPED | OUTPATIENT
Start: 2020-12-28 | End: 2021-01-19

## 2020-12-28 RX ORDER — AMLODIPINE BESYLATE 5 MG/1
TABLET ORAL
Qty: 45 TABLET | Refills: 0 | Status: SHIPPED | OUTPATIENT
Start: 2020-12-28 | End: 2021-01-19

## 2021-01-05 ENCOUNTER — VIRTUAL VISIT (OUTPATIENT)
Dept: FAMILY MEDICINE CLINIC | Age: 41
End: 2021-01-05
Payer: COMMERCIAL

## 2021-01-05 DIAGNOSIS — I10 ESSENTIAL HYPERTENSION: ICD-10-CM

## 2021-01-05 DIAGNOSIS — R10.13 DYSPEPSIA: ICD-10-CM

## 2021-01-05 DIAGNOSIS — Z20.822 SUSPECTED COVID-19 VIRUS INFECTION: Primary | ICD-10-CM

## 2021-01-05 PROCEDURE — 4004F PT TOBACCO SCREEN RCVD TLK: CPT | Performed by: INTERNAL MEDICINE

## 2021-01-05 PROCEDURE — G8417 CALC BMI ABV UP PARAM F/U: HCPCS | Performed by: INTERNAL MEDICINE

## 2021-01-05 PROCEDURE — G8484 FLU IMMUNIZE NO ADMIN: HCPCS | Performed by: INTERNAL MEDICINE

## 2021-01-05 PROCEDURE — 99213 OFFICE O/P EST LOW 20 MIN: CPT | Performed by: INTERNAL MEDICINE

## 2021-01-05 PROCEDURE — G8428 CUR MEDS NOT DOCUMENT: HCPCS | Performed by: INTERNAL MEDICINE

## 2021-01-05 ASSESSMENT — ENCOUNTER SYMPTOMS
BLOOD IN STOOL: 0
RHINORRHEA: 0
ABDOMINAL DISTENTION: 0
VOMITING: 0
EYE REDNESS: 0
SINUS PAIN: 0
VOICE CHANGE: 0
ABDOMINAL PAIN: 0
EYE ITCHING: 0
DIARRHEA: 0
FACIAL SWELLING: 0
APNEA: 0
SINUS PRESSURE: 0
SHORTNESS OF BREATH: 0
EYE DISCHARGE: 0
EYE PAIN: 0
COLOR CHANGE: 0
BACK PAIN: 0
SORE THROAT: 0
TROUBLE SWALLOWING: 0
COUGH: 1
CONSTIPATION: 0
NAUSEA: 0
CHEST TIGHTNESS: 0
RECTAL PAIN: 0
PHOTOPHOBIA: 0
WHEEZING: 0

## 2021-01-05 NOTE — PROGRESS NOTES
Subjective:      Patient ID: David Terry is a 38 y.o. male who presents today for:  Chief Complaint   Patient presents with    Cough     78-year-old male with history of hypertension and alcohol abuse presents with a complaint of intermittent belching, bloating and heart burn. Suspect COVID:  Headache, chest pain, dry  cough , sweats, myalgia. Pt has several coworkers who have been positive      Hypertension:  Blood pressure has been elevated   In regards to his hypertension he has been compliant with chlorthalidone 25 mg orally daily,  Norvasc 5 mg orally daily. However he has not been taking lisinopril 10 mg orally daily. Due to the fact that he ran out of this medication. Influenza vaccination: Previously administered influenza vaccination     At present he denies polyuria,  Polydipsia, constitutional, sinus, visual, cardiopulmonary, urologic, gastrointestinal, immunologic/hematologic, musculoskeletal, neurologic,dermatologic, or psychiatric complaints.       Past Medical History:   Diagnosis Date    Depression     Hypertension 2015    trx with pills x 1 month / patient did not follow up    Smoker      Past Surgical History:   Procedure Laterality Date    WA CREATE EARDRUM OPENING,GEN ANESTH Bilateral 2/8/2018    BILATERAL MYRINGOTOMY WITH VENTILING TUBE INSERTION (PAT DONE 1/22/18) performed by Tanya Martinez MD at [place] [address] History     Socioeconomic History    Marital status: Single     Spouse name: Not on file    Number of children: Not on file    Years of education: Not on file    Highest education level: Not on file   Occupational History    Not on file   Social Needs    Financial resource strain: Not on file    Food insecurity     Worry: Not on file     Inability: Not on file    Transportation needs     Medical: Not on file     Non-medical: Not on file   Tobacco Use    Smoking status: Current Every Day Smoker     Packs/day: 1.00     Years: 20.00     Pack years: 20.00     Types: Cigarettes    Smokeless tobacco: Never Used   Substance and Sexual Activity    Alcohol use: Not Currently     Alcohol/week: 6.0 standard drinks     Types: 6 Cans of beer per week     Comment: 6 pkg per day    Drug use: Yes     Types: Marijuana     Comment: 2-3 x per week    Sexual activity: Not on file   Lifestyle    Physical activity     Days per week: Not on file     Minutes per session: Not on file    Stress: Not on file   Relationships    Social connections     Talks on phone: Not on file     Gets together: Not on file     Attends Worship service: Not on file     Active member of club or organization: Not on file     Attends meetings of clubs or organizations: Not on file     Relationship status: Not on file    Intimate partner violence     Fear of current or ex partner: Not on file     Emotionally abused: Not on file     Physically abused: Not on file     Forced sexual activity: Not on file   Other Topics Concern    Not on file   Social History Narrative    Not on file     Family History   Problem Relation Age of Onset    High Blood Pressure Mother     Heart Disease Mother     Other Mother         copd / smoker    Stroke Father     High Blood Pressure Brother     Other Brother         anxiety     No Known Allergies  Current Outpatient Medications on File Prior to Visit   Medication Sig Dispense Refill    amLODIPine (NORVASC) 5 MG tablet TAKE 1 & 1/2 (ONE AND ONE-HALF) TABLETS BY MOUTH DAILY 45 tablet 0    citalopram (CELEXA) 20 MG tablet TAKE 1 TABLET BY MOUTH EVERY DAY 30 tablet 0    atorvastatin (LIPITOR) 20 MG tablet TAKE 1 TABLET BY MOUTH EVERY DAY 30 tablet 0    pantoprazole (PROTONIX) 40 MG tablet TAKE 1 TABLET BY MOUTH EVERY MORNING before BREAKFAST 30 tablet 2    lisinopril (PRINIVIL;ZESTRIL) 10 MG tablet TAKE 1 TABLET BY MOUTH EVERY DAY 90 tablet 0    chlorthalidone (HYGROTON) 25 MG tablet TAKE 1 TABLET BY MOUTH DAILY 90 tablet 0    lisinopril (PRINIVIL;ZESTRIL) 10 MG tablet TAKE 1 TABLET BY MOUTH DAILY 90 tablet 0    fluticasone (FLONASE) 50 MCG/ACT nasal spray 1 spray by Nasal route daily 1 Bottle 1    ibuprofen (ADVIL;MOTRIN) 800 MG tablet Take 1 tablet by mouth every 6 hours as needed for Pain 20 tablet 0    nicotine (NICODERM CQ) 14 MG/24HR Place 1 patch onto the skin daily for 14 days 14 patch 5     No current facility-administered medications on file prior to visit. Review of Systems   Constitutional: Negative for chills, diaphoresis, fatigue and fever. HENT: Negative for congestion, dental problem, drooling, ear discharge, ear pain, facial swelling, hearing loss, mouth sores, nosebleeds, postnasal drip, rhinorrhea, sinus pressure, sinus pain, sneezing, sore throat, tinnitus, trouble swallowing and voice change. Eyes: Negative for photophobia, pain, discharge, redness, itching and visual disturbance. Respiratory: Positive for cough. Negative for apnea, chest tightness, shortness of breath and wheezing. Cardiovascular: Negative for chest pain, palpitations and leg swelling. Gastrointestinal: Negative for abdominal distention, abdominal pain, blood in stool, constipation, diarrhea, nausea, rectal pain and vomiting. Endocrine: Negative for cold intolerance, heat intolerance, polydipsia, polyphagia and polyuria. Genitourinary: Negative for decreased urine volume, difficulty urinating, dysuria, flank pain, frequency, genital sores, hematuria and urgency. Musculoskeletal: Negative for arthralgias, back pain, gait problem, joint swelling, myalgias, neck pain and neck stiffness. Skin: Negative for color change, rash and wound. Allergic/Immunologic: Negative for environmental allergies and food allergies. Neurological: Negative for dizziness, tremors, seizures, syncope, facial asymmetry, speech difficulty, weakness, light-headedness, numbness and headaches. Hematological: Negative for adenopathy.  Does not

## 2021-01-11 ASSESSMENT — PATIENT HEALTH QUESTIONNAIRE - PHQ9
SUM OF ALL RESPONSES TO PHQ QUESTIONS 1-9: 0
SUM OF ALL RESPONSES TO PHQ9 QUESTIONS 1 & 2: 0
2. FEELING DOWN, DEPRESSED OR HOPELESS: 0
1. LITTLE INTEREST OR PLEASURE IN DOING THINGS: 0

## 2021-01-17 DIAGNOSIS — I10 ESSENTIAL HYPERTENSION: Chronic | ICD-10-CM

## 2021-01-18 DIAGNOSIS — I10 ESSENTIAL HYPERTENSION: Chronic | ICD-10-CM

## 2021-01-18 RX ORDER — LISINOPRIL 10 MG/1
TABLET ORAL
Qty: 30 TABLET | Refills: 0 | Status: SHIPPED | OUTPATIENT
Start: 2021-01-18 | End: 2021-05-11

## 2021-01-18 RX ORDER — CHLORTHALIDONE 25 MG/1
25 TABLET ORAL DAILY
Qty: 30 TABLET | Refills: 0 | Status: SHIPPED | OUTPATIENT
Start: 2021-01-18 | End: 2021-05-11

## 2021-01-19 RX ORDER — AMLODIPINE BESYLATE 5 MG/1
TABLET ORAL
Qty: 45 TABLET | Refills: 0 | Status: SHIPPED | OUTPATIENT
Start: 2021-01-19 | End: 2021-03-20 | Stop reason: SDUPTHER

## 2021-01-19 RX ORDER — ATORVASTATIN CALCIUM 20 MG/1
TABLET, FILM COATED ORAL
Qty: 30 TABLET | Refills: 0 | Status: SHIPPED | OUTPATIENT
Start: 2021-01-19 | End: 2021-12-15 | Stop reason: SDUPTHER

## 2021-01-19 RX ORDER — PANTOPRAZOLE SODIUM 40 MG/1
TABLET, DELAYED RELEASE ORAL
Qty: 30 TABLET | Refills: 2 | Status: SHIPPED | OUTPATIENT
Start: 2021-01-19 | End: 2021-04-26

## 2021-01-19 RX ORDER — CHLORTHALIDONE 25 MG/1
25 TABLET ORAL DAILY
Qty: 90 TABLET | Refills: 0 | Status: SHIPPED | OUTPATIENT
Start: 2021-01-19 | End: 2021-04-26

## 2021-01-19 RX ORDER — LISINOPRIL 10 MG/1
TABLET ORAL
Qty: 90 TABLET | Refills: 0 | Status: SHIPPED | OUTPATIENT
Start: 2021-01-19 | End: 2021-04-26

## 2021-01-19 RX ORDER — CITALOPRAM 20 MG/1
TABLET ORAL
Qty: 30 TABLET | Refills: 0 | Status: SHIPPED | OUTPATIENT
Start: 2021-01-19 | End: 2021-03-20 | Stop reason: SDUPTHER

## 2021-03-01 ENCOUNTER — TELEPHONE (OUTPATIENT)
Dept: FAMILY MEDICINE CLINIC | Age: 41
End: 2021-03-01

## 2021-03-10 ENCOUNTER — TELEPHONE (OUTPATIENT)
Dept: FAMILY MEDICINE CLINIC | Age: 41
End: 2021-03-10

## 2021-03-10 ENCOUNTER — VIRTUAL VISIT (OUTPATIENT)
Dept: FAMILY MEDICINE CLINIC | Age: 41
End: 2021-03-10
Payer: COMMERCIAL

## 2021-03-10 DIAGNOSIS — J34.89 SINUS PAIN: ICD-10-CM

## 2021-03-10 DIAGNOSIS — H92.02 LEFT EAR PAIN: Primary | ICD-10-CM

## 2021-03-10 DIAGNOSIS — R04.0 EPISTAXIS: ICD-10-CM

## 2021-03-10 PROCEDURE — G8421 BMI NOT CALCULATED: HCPCS | Performed by: PHYSICIAN ASSISTANT

## 2021-03-10 PROCEDURE — 99212 OFFICE O/P EST SF 10 MIN: CPT | Performed by: PHYSICIAN ASSISTANT

## 2021-03-10 PROCEDURE — G8484 FLU IMMUNIZE NO ADMIN: HCPCS | Performed by: PHYSICIAN ASSISTANT

## 2021-03-10 PROCEDURE — G8428 CUR MEDS NOT DOCUMENT: HCPCS | Performed by: PHYSICIAN ASSISTANT

## 2021-03-10 PROCEDURE — 4004F PT TOBACCO SCREEN RCVD TLK: CPT | Performed by: PHYSICIAN ASSISTANT

## 2021-03-10 RX ORDER — ECHINACEA PURPUREA EXTRACT 125 MG
1 TABLET ORAL PRN
Qty: 1 BOTTLE | Refills: 3 | Status: SHIPPED | OUTPATIENT
Start: 2021-03-10 | End: 2021-12-15 | Stop reason: SDUPTHER

## 2021-03-10 RX ORDER — AMOXICILLIN AND CLAVULANATE POTASSIUM 875; 125 MG/1; MG/1
1 TABLET, FILM COATED ORAL 2 TIMES DAILY
Qty: 20 TABLET | Refills: 0 | Status: SHIPPED | OUTPATIENT
Start: 2021-03-10 | End: 2021-03-20

## 2021-03-10 ASSESSMENT — ENCOUNTER SYMPTOMS
EYES NEGATIVE: 1
SINUS PRESSURE: 1
RESPIRATORY NEGATIVE: 1
ALLERGIC/IMMUNOLOGIC NEGATIVE: 1
GASTROINTESTINAL NEGATIVE: 1
SORE THROAT: 0
SINUS PAIN: 1

## 2021-03-10 NOTE — PROGRESS NOTES
Enio Hill (:  1980) is a 38 y.o. male,Established patient, here for evaluation of the following chief complaint(s): No chief complaint on file. left ear pain and sinus pain         Assessment & Plan    Diagnosis Orders   1. Left ear pain     2. Sinus pain         [facility]  Medicine telephone visit due to concern for exposure to COVID-19 (coronavirus). Patient with complaint of left ear pain clear discharge left-sided facial pain for the past 2 days. Also complaining of episodic nosebleed  Has history of chronic sinus issues and eustachian tube dysfunction  Previously with TM tubes  Presently has perforation of left tm he states  He complains of need to wear a mask during work states the environment is very hot and he has difficulty breathing. requesting a note excepting him from  use  Review of Systems   Constitutional: Negative for fatigue and fever. HENT: Positive for postnasal drip, sinus pressure and sinus pain. Negative for sore throat. Eyes: Negative. Respiratory: Negative. Cardiovascular: Negative. Gastrointestinal: Negative. Endocrine: Negative. Genitourinary: Negative. Musculoskeletal: Negative. Allergic/Immunologic: Negative. Neurological: Negative. Hematological: Negative. Psychiatric/Behavioral: Negative. All other systems reviewed and are negative. No flowsheet data found. Physical Exam  Pulmonary:      Effort: Pulmonary effort is normal. No respiratory distress. Breath sounds: No wheezing. Skin:     General: Skin is dry. Neurological:      Mental Status: He is oriented to person, place, and time. Psychiatric:         Mood and Affect: Mood normal.         Thought Content:  Thought content normal.         Judgment: Judgment normal.         Advised to contact PCP for exemption for mask wearing during work    On this date 3/10/2021 I have spent 15 minutes reviewing previous notes, test results and face to face (virtual) with the patient discussing the diagnosis and importance of compliance with the treatment plan as well as documenting on the day of the visit. [employer], was evaluated through a synchronous (real-time) audio-video encounter. The patient (or guardian if applicable) is aware that this is a billable service. Verbal consent to proceed has been obtained within the past 12 months. The visit was conducted pursuant to the emergency declaration under the [address] [place] [address] and the [employer] and [employer]. Patient identification was verified, and a caregiver was present when appropriate. The patient was located in a state where the provider was credentialed to provide care. An electronic signature was used to authenticate this note.     --Catrina Wade PA-C

## 2021-03-11 ENCOUNTER — TELEPHONE (OUTPATIENT)
Dept: FAMILY MEDICINE CLINIC | Age: 41
End: 2021-03-11

## 2021-03-11 ENCOUNTER — VIRTUAL VISIT (OUTPATIENT)
Dept: FAMILY MEDICINE CLINIC | Age: 41
End: 2021-03-11
Payer: COMMERCIAL

## 2021-03-11 DIAGNOSIS — J34.89 SINUS PAIN: ICD-10-CM

## 2021-03-11 DIAGNOSIS — R04.0 EPISTAXIS: ICD-10-CM

## 2021-03-11 DIAGNOSIS — H92.02 LEFT EAR PAIN: Primary | ICD-10-CM

## 2021-03-11 PROCEDURE — G8484 FLU IMMUNIZE NO ADMIN: HCPCS | Performed by: INTERNAL MEDICINE

## 2021-03-11 PROCEDURE — 4004F PT TOBACCO SCREEN RCVD TLK: CPT | Performed by: INTERNAL MEDICINE

## 2021-03-11 PROCEDURE — 99213 OFFICE O/P EST LOW 20 MIN: CPT | Performed by: INTERNAL MEDICINE

## 2021-03-11 PROCEDURE — G8428 CUR MEDS NOT DOCUMENT: HCPCS | Performed by: INTERNAL MEDICINE

## 2021-03-11 PROCEDURE — G8421 BMI NOT CALCULATED: HCPCS | Performed by: INTERNAL MEDICINE

## 2021-03-11 ASSESSMENT — ENCOUNTER SYMPTOMS
BLOOD IN STOOL: 0
TROUBLE SWALLOWING: 0
RHINORRHEA: 0
CONSTIPATION: 0
FACIAL SWELLING: 0
NAUSEA: 0
SHORTNESS OF BREATH: 0
ABDOMINAL PAIN: 0
EYE REDNESS: 0
CHEST TIGHTNESS: 0
SORE THROAT: 0
APNEA: 0
EYE PAIN: 0
COLOR CHANGE: 0
EYE DISCHARGE: 0
SINUS PRESSURE: 1
EYE ITCHING: 0
BACK PAIN: 0
WHEEZING: 0
DIARRHEA: 0
RECTAL PAIN: 0
SINUS PAIN: 1
PHOTOPHOBIA: 0
VOMITING: 0
VOICE CHANGE: 0
COUGH: 0
ABDOMINAL DISTENTION: 0

## 2021-03-11 NOTE — PROGRESS NOTES
Subjective:      Patient ID: Loyd Roldan is a 38 y.o. male who presents today for:  No chief complaint on file. 69-year-old male with history of hypertension and alcohol abuse presents with a complaint of intermittent belching, bloating and heart burn. 26-year-old male presents with a complaint of sinus congestion and associated sinus pain and drainage. Bacterial sinusitis: Patient was placed on antibiotic therapy yesterday. He is compliant with Augmentin as prescribed. He denies fevers chills weight loss night sweats. He however reports decreased hearing in his left ear. Since initiating antibiotic therapy he has noted some improvement in thes right call apologizing right you do not do well at home symptoms. Hypertension:     In regards to his hypertension he has been compliant with chlorthalidone 25 mg orally daily,  Norvasc 5 mg orally daily. However he has not been taking lisinopril 10 mg orally daily. Due to the fact that he ran out of this medication. Influenza vaccination: Previously administered influenza vaccination     At present he denies polyuria,  Polydipsia, constitutional, sinus, visual, cardiopulmonary, urologic, gastrointestinal, immunologic/hematologic, musculoskeletal, neurologic,dermatologic, or psychiatric complaints.       Past Medical History:   Diagnosis Date    Depression     Hypertension 2015    trx with pills x 1 month / patient did not follow up    Smoker      Past Surgical History:   Procedure Laterality Date    MT CREATE EARDRUM OPENING,GEN ANESTH Bilateral 2/8/2018    BILATERAL MYRINGOTOMY WITH VENTILING TUBE INSERTION (PAT DONE 1/22/18) performed by Flynn Brooks MD at [place] [address] History     Socioeconomic History    Marital status: Single     Spouse name: Not on file    Number of children: Not on file    Years of education: Not on file    Highest education level: Not on file   Occupational History    Not on file   Social Needs    Financial resource strain: Not on file    Food insecurity     Worry: Not on file     Inability: Not on file    Transportation needs     Medical: Not on file     Non-medical: Not on file   Tobacco Use    Smoking status: Current Every Day Smoker     Packs/day: 1.00     Years: 20.00     Pack years: 20.00     Types: Cigarettes    Smokeless tobacco: Never Used   Substance and Sexual Activity    Alcohol use: Not Currently     Alcohol/week: 6.0 standard drinks     Types: 6 Cans of beer per week     Comment: 6 pkg per day    Drug use: Yes     Types: Marijuana     Comment: 2-3 x per week    Sexual activity: Not on file   Lifestyle    Physical activity     Days per week: Not on file     Minutes per session: Not on file    Stress: Not on file   Relationships    Social connections     Talks on phone: Not on file     Gets together: Not on file     Attends [employer] service: Not on file     Active member of club or organization: Not on file     Attends meetings of clubs or organizations: Not on file     Relationship status: Not on file    Intimate partner violence     Fear of current or ex partner: Not on file     Emotionally abused: Not on file     Physically abused: Not on file     Forced sexual activity: Not on file   Other Topics Concern    Not on file   Social History Narrative    Not on file     Family History   Problem Relation Age of Onset    High Blood Pressure Mother     Heart Disease Mother     Other Mother         copd / smoker    Stroke Father     High Blood Pressure Brother     Other Brother         anxiety     No Known Allergies  Current Outpatient Medications on File Prior to Visit   Medication Sig Dispense Refill    amoxicillin-clavulanate (AUGMENTIN) 875-125 MG per tablet Take 1 tablet by mouth 2 times daily for 10 days 20 tablet 0    sodium chloride (ALTAMIST SPRAY) 0.65 % nasal spray 1 spray by Nasal route as needed for Congestion 1 Bottle 3    lisinopril (PRINIVIL;ZESTRIL) 10 MG tablet TAKE 1 TABLET BY MOUTH DAILY 90 tablet 0    chlorthalidone (HYGROTON) 25 MG tablet TAKE 1 TABLET BY MOUTH DAILY 90 tablet 0    citalopram (CELEXA) 20 MG tablet TAKE 1 TABLET BY MOUTH EVERY DAY 30 tablet 0    amLODIPine (NORVASC) 5 MG tablet TAKE 1 & 1/2 (ONE AND ONE-HALF) TABLETS BY MOUTH DAILY 45 tablet 0    atorvastatin (LIPITOR) 20 MG tablet TAKE 1 TABLET BY MOUTH EVERY DAY 30 tablet 0    pantoprazole (PROTONIX) 40 MG tablet TAKE 1 TABLET BY MOUTH EVERY DAY IN THE MORNING before breakfast 30 tablet 2    lisinopril (PRINIVIL;ZESTRIL) 10 MG tablet TAKE 1 TABLET BY MOUTH EVERY DAY 30 tablet 0    chlorthalidone (HYGROTON) 25 MG tablet Take 1 tablet by mouth daily 30 tablet 0    fluticasone (FLONASE) 50 MCG/ACT nasal spray 1 spray by Nasal route daily 1 Bottle 1    ibuprofen (ADVIL;MOTRIN) 800 MG tablet Take 1 tablet by mouth every 6 hours as needed for Pain 20 tablet 0    nicotine (NICODERM CQ) 14 MG/24HR Place 1 patch onto the skin daily for 14 days 14 patch 5     No current facility-administered medications on file prior to visit. Review of Systems   Constitutional: Negative for chills, diaphoresis, fatigue and fever. HENT: Positive for ear pain, sinus pressure and sinus pain. Negative for congestion, dental problem, drooling, ear discharge, facial swelling, hearing loss, mouth sores, nosebleeds, postnasal drip, rhinorrhea, sneezing, sore throat, tinnitus, trouble swallowing and voice change. Eyes: Negative for photophobia, pain, discharge, redness, itching and visual disturbance. Respiratory: Negative for apnea, cough, chest tightness, shortness of breath and wheezing. Cardiovascular: Negative for chest pain, palpitations and leg swelling. Gastrointestinal: Negative for abdominal distention, abdominal pain, blood in stool, constipation, diarrhea, nausea, rectal pain and vomiting.    Endocrine: Negative for cold intolerance, heat intolerance, polydipsia, polyphagia and polyuria. Genitourinary: Negative for decreased urine volume, difficulty urinating, dysuria, flank pain, frequency, genital sores, hematuria and urgency. Musculoskeletal: Negative for arthralgias, back pain, gait problem, joint swelling, myalgias, neck pain and neck stiffness. Skin: Negative for color change, rash and wound. Allergic/Immunologic: Negative for environmental allergies and food allergies. Neurological: Negative for dizziness, tremors, seizures, syncope, facial asymmetry, speech difficulty, weakness, light-headedness, numbness and headaches. Hematological: Negative for adenopathy. Does not bruise/bleed easily. Psychiatric/Behavioral: Negative for agitation, confusion, decreased concentration, hallucinations, self-injury, sleep disturbance and suicidal ideas. The patient is not nervous/anxious. Objective: There were no vitals taken for this visit. Physical Exam  Constitutional:       General: He is not in acute distress. Appearance: He is well-developed. HENT:      Head: Normocephalic. Right Ear: External ear normal.      Left Ear: External ear normal.   Eyes:      Conjunctiva/sclera: Conjunctivae normal.      Pupils: Pupils are equal, round, and reactive to light. Neck:      Musculoskeletal: Neck supple. Trachea: No tracheal deviation. Cardiovascular:      Rate and Rhythm: Normal rate and regular rhythm. Heart sounds: Normal heart sounds. Pulmonary:      Effort: Pulmonary effort is normal. No respiratory distress. Breath sounds: Normal breath sounds. No wheezing or rales. Chest:      Chest wall: No tenderness. Abdominal:      General: Bowel sounds are normal. There is no distension. Palpations: Abdomen is soft. There is no mass. Tenderness: There is no abdominal tenderness. There is no guarding or rebound. Musculoskeletal:         General: No tenderness or deformity.    Skin:     General: Skin is warm and dry.      Coloration: Skin is not pale. Findings: No erythema or rash. Neurological:      Mental Status: He is alert and oriented to person, place, and time. Psychiatric:         Thought Content: Thought content normal.         Judgment: Judgment normal.         Assessment:       Diagnosis Orders   1. Left ear pain     2. Sinus pain     3. Epistaxis           Plan:      Sherlyn Mayo was seen today for cough, congestion and foot pain. Diagnoses and all orders for this visit:  Sinusitis-continue augmentin, flonase, claritin and f/u         Dyspepsia Protonix 40 mg orally daily. Essential hypertensioncontinue Norvasc 5 mill grams orally daily, chlorthalidone 25 mg orally daily and lisinopril 10 mg orally daily. Depression: Continue Celexa 20 mg orally daily. Need for influenza vaccinationadminister influenza vaccination today        Return in about 3 weeks (around 4/1/2021). Jyotsna Root MD       Requested that the patient be  Provided with a work note for 3/9, 3/10, 3/11 and fax to [phone]. Please note, this report has been partially produced using speech recognition software  and may cause  and /or contain errors related to that system including grammar, punctuation and spelling as well as words and phrases that may seem inappropriate. If there are questions or concerns please feel free to contact me to clarify.

## 2021-03-12 ENCOUNTER — TELEPHONE (OUTPATIENT)
Dept: FAMILY MEDICINE CLINIC | Age: 41
End: 2021-03-12

## 2021-03-12 NOTE — TELEPHONE ENCOUNTER
Patient is calling in stating that he is light headed, he vomited this morning and does not have any energy. Patient has not gotten out of the bed. He states that the symptoms started on Tuesday. Patient started the antibiotics yesterday. I did let the patient know that it can take some time for the antibiotic to start working. Patient also states that he has been having the sweats but does not have a fever. Patient is afraid that he is going to lose his job, I am going to write him a new note to keep him off of work until Monday.

## 2021-03-19 RX ORDER — CITALOPRAM 20 MG/1
TABLET ORAL
Qty: 30 TABLET | Refills: 0 | Status: CANCELLED | OUTPATIENT
Start: 2021-03-19

## 2021-03-19 RX ORDER — AMLODIPINE BESYLATE 5 MG/1
TABLET ORAL
Qty: 45 TABLET | Refills: 0 | Status: CANCELLED | OUTPATIENT
Start: 2021-03-19

## 2021-03-20 RX ORDER — CITALOPRAM 20 MG/1
TABLET ORAL
Qty: 30 TABLET | Refills: 3 | Status: SHIPPED | OUTPATIENT
Start: 2021-03-20 | End: 2021-06-21

## 2021-03-20 RX ORDER — AMLODIPINE BESYLATE 5 MG/1
7.5 TABLET ORAL DAILY
Qty: 45 TABLET | Refills: 3 | Status: SHIPPED | OUTPATIENT
Start: 2021-03-20 | End: 2021-05-11

## 2021-04-24 DIAGNOSIS — I10 ESSENTIAL HYPERTENSION: Chronic | ICD-10-CM

## 2021-04-26 RX ORDER — CHLORTHALIDONE 25 MG/1
25 TABLET ORAL DAILY
Qty: 90 TABLET | Refills: 0 | Status: SHIPPED | OUTPATIENT
Start: 2021-04-26 | End: 2021-05-11

## 2021-04-26 RX ORDER — LISINOPRIL 10 MG/1
TABLET ORAL
Qty: 90 TABLET | Refills: 0 | Status: SHIPPED | OUTPATIENT
Start: 2021-04-26 | End: 2021-05-11

## 2021-04-26 RX ORDER — PANTOPRAZOLE SODIUM 40 MG/1
TABLET, DELAYED RELEASE ORAL
Qty: 30 TABLET | Refills: 2 | Status: SHIPPED | OUTPATIENT
Start: 2021-04-26 | End: 2021-08-05 | Stop reason: SDUPTHER

## 2021-05-11 ENCOUNTER — OFFICE VISIT (OUTPATIENT)
Dept: FAMILY MEDICINE CLINIC | Age: 41
End: 2021-05-11
Payer: COMMERCIAL

## 2021-05-11 VITALS
OXYGEN SATURATION: 98 % | SYSTOLIC BLOOD PRESSURE: 160 MMHG | WEIGHT: 289 LBS | BODY MASS INDEX: 35.19 KG/M2 | DIASTOLIC BLOOD PRESSURE: 90 MMHG | RESPIRATION RATE: 16 BRPM | HEIGHT: 76 IN | HEART RATE: 83 BPM | TEMPERATURE: 97 F

## 2021-05-11 DIAGNOSIS — I10 ESSENTIAL HYPERTENSION: Primary | Chronic | ICD-10-CM

## 2021-05-11 DIAGNOSIS — Z11.4 ENCOUNTER FOR SCREENING FOR HIV: ICD-10-CM

## 2021-05-11 DIAGNOSIS — Z11.59 NEED FOR HEPATITIS C SCREENING TEST: ICD-10-CM

## 2021-05-11 PROCEDURE — G8427 DOCREV CUR MEDS BY ELIG CLIN: HCPCS | Performed by: INTERNAL MEDICINE

## 2021-05-11 PROCEDURE — 99213 OFFICE O/P EST LOW 20 MIN: CPT | Performed by: INTERNAL MEDICINE

## 2021-05-11 PROCEDURE — 4004F PT TOBACCO SCREEN RCVD TLK: CPT | Performed by: INTERNAL MEDICINE

## 2021-05-11 PROCEDURE — G8417 CALC BMI ABV UP PARAM F/U: HCPCS | Performed by: INTERNAL MEDICINE

## 2021-05-11 RX ORDER — AMLODIPINE, VALSARTAN AND HYDROCHLOROTHIAZIDE 10; 320; 25 MG/1; MG/1; MG/1
TABLET ORAL
Qty: 30 TABLET | Refills: 3 | Status: SHIPPED | OUTPATIENT
Start: 2021-05-11 | End: 2021-05-17 | Stop reason: SDUPTHER

## 2021-05-11 SDOH — ECONOMIC STABILITY: FOOD INSECURITY: WITHIN THE PAST 12 MONTHS, THE FOOD YOU BOUGHT JUST DIDN'T LAST AND YOU DIDN'T HAVE MONEY TO GET MORE.: NEVER TRUE

## 2021-05-11 ASSESSMENT — ENCOUNTER SYMPTOMS
DIARRHEA: 0
EYE DISCHARGE: 0
SINUS PRESSURE: 1
EYE ITCHING: 0
WHEEZING: 0
APNEA: 0
ABDOMINAL DISTENTION: 0
RHINORRHEA: 0
TROUBLE SWALLOWING: 0
PHOTOPHOBIA: 0
EYE REDNESS: 0
NAUSEA: 0
FACIAL SWELLING: 0
BLOOD IN STOOL: 0
CONSTIPATION: 0
EYE PAIN: 0
CHEST TIGHTNESS: 0
RECTAL PAIN: 0
ABDOMINAL PAIN: 0
COLOR CHANGE: 0
BACK PAIN: 0
SINUS PAIN: 1
COUGH: 0
VOICE CHANGE: 0
SORE THROAT: 0
VOMITING: 0
SHORTNESS OF BREATH: 0

## 2021-05-11 NOTE — PROGRESS NOTES
Subjective:      Patient ID: Sharmaine Vee is a 38 y.o. male who presents today for:  Chief Complaint   Patient presents with    Hypertension     50-year-old male with history of hypertension and alcohol abuse presents for follow-up visit        Hypertension:     In regards to his hypertension he has been compliant with chlorthalidone 25 mg orally daily,  Norvasc 5 mg orally daily And lisinopril 10 mg orally daily. In spite of this his blood pressure is poorly controlled      Influenza vaccination: Previously administered influenza vaccination     At present he denies polyuria,  Polydipsia, constitutional, sinus, visual, cardiopulmonary, urologic, gastrointestinal, immunologic/hematologic, musculoskeletal, neurologic,dermatologic, or psychiatric complaints.       Past Medical History:   Diagnosis Date    Depression     Hypertension 2015    trx with pills x 1 month / patient did not follow up    Smoker      Past Surgical History:   Procedure Laterality Date    MD CREATE EARDRUM OPENING,GEN ANESTH Bilateral 2/8/2018    BILATERAL MYRINGOTOMY WITH VENTILING TUBE INSERTION (PAT DONE 1/22/18) performed by Altagracia Hammond MD at [place] [address] History     Socioeconomic History    Marital status: Single     Spouse name: Not on file    Number of children: Not on file    Years of education: Not on file    Highest education level: Not on file   Occupational History    Not on file   Social Needs    Financial resource strain: Not hard at all    Food insecurity     Worry: Never true     Inability: Never true   [employer] needs     Medical: No     Non-medical: No   Tobacco Use    Smoking status: Current Every Day Smoker     Packs/day: 1.00     Years: 20.00     Pack years: 20.00     Types: Cigarettes    Smokeless tobacco: Never Used   Substance and Sexual Activity    Alcohol use: Not Currently     Alcohol/week: 6.0 standard drinks     Types: 6 Cans of beer per week     Comment: 6 pkg per day    Drug use: Yes     Types: Marijuana     Comment: 2-3 x per week    Sexual activity: Not on file   Lifestyle    Physical activity     Days per week: Not on file     Minutes per session: Not on file    Stress: Not on file   Relationships    Social connections     Talks on phone: Not on file     Gets together: Not on file     Attends [employer] service: Not on file     Active member of club or organization: Not on file     Attends meetings of clubs or organizations: Not on file     Relationship status: Not on file    Intimate partner violence     Fear of current or ex partner: Not on file     Emotionally abused: Not on file     Physically abused: Not on file     Forced sexual activity: Not on file   Other Topics Concern    Not on file   Social History Narrative    Not on file     Family History   Problem Relation Age of Onset    High Blood Pressure Mother     Heart Disease Mother     Other Mother         copd / smoker    Stroke Father     High Blood Pressure Brother     Other Brother         anxiety     No Known Allergies  Current Outpatient Medications on File Prior to Visit   Medication Sig Dispense Refill    pantoprazole (PROTONIX) 40 MG tablet TAKE 1 TABLET BY MOUTH EVERY DAY IN THE MORNING before BREAKFAST 30 tablet 2    albuterol sulfate  (90 Base) MCG/ACT inhaler Inhale 2 puffs every 6 hours as needed for wheezing      loratadine (CLARITIN) 10 MG tablet TAKE 1 TABLET BY MOUTH EVERY DAY      citalopram (CELEXA) 20 MG tablet TAKE 1 TABLET BY MOUTH EVERY DAY 30 tablet 3    sodium chloride (ALTAMIST SPRAY) 0.65 % nasal spray 1 spray by Nasal route as needed for Congestion 1 Bottle 3    atorvastatin (LIPITOR) 20 MG tablet TAKE 1 TABLET BY MOUTH EVERY DAY 30 tablet 0    fluticasone (FLONASE) 50 MCG/ACT nasal spray 1 spray by Nasal route daily 1 Bottle 1     No current facility-administered medications on file prior to visit.              Review of Systems   Constitutional: Negative for chills, diaphoresis, fatigue and fever. HENT: Positive for ear pain, sinus pressure and sinus pain. Negative for congestion, dental problem, drooling, ear discharge, facial swelling, hearing loss, mouth sores, nosebleeds, postnasal drip, rhinorrhea, sneezing, sore throat, tinnitus, trouble swallowing and voice change. Eyes: Negative for photophobia, pain, discharge, redness, itching and visual disturbance. Respiratory: Negative for apnea, cough, chest tightness, shortness of breath and wheezing. Cardiovascular: Negative for chest pain, palpitations and leg swelling. Gastrointestinal: Negative for abdominal distention, abdominal pain, blood in stool, constipation, diarrhea, nausea, rectal pain and vomiting. Endocrine: Negative for cold intolerance, heat intolerance, polydipsia, polyphagia and polyuria. Genitourinary: Negative for decreased urine volume, difficulty urinating, dysuria, flank pain, frequency, genital sores, hematuria and urgency. Musculoskeletal: Negative for arthralgias, back pain, gait problem, joint swelling, myalgias, neck pain and neck stiffness. Skin: Negative for color change, rash and wound. Allergic/Immunologic: Negative for environmental allergies and food allergies. Neurological: Negative for dizziness, tremors, seizures, syncope, facial asymmetry, speech difficulty, weakness, light-headedness, numbness and headaches. Hematological: Negative for adenopathy. Does not bruise/bleed easily. Psychiatric/Behavioral: Negative for agitation, confusion, decreased concentration, hallucinations, self-injury, sleep disturbance and suicidal ideas. The patient is not nervous/anxious. Objective:   BP (!) 160/90   Pulse 83   Temp 97 °F (36.1 °C)   Resp 16   Ht 6' 4\" (1.93 m)   Wt 289 lb (131.1 kg)   SpO2 98%   BMI 35.18 kg/m²     Physical Exam  Constitutional:       General: He is not in acute distress. Appearance: He is well-developed.    HENT: Head: Normocephalic. Right Ear: External ear normal.      Left Ear: External ear normal.   Eyes:      Conjunctiva/sclera: Conjunctivae normal.      Pupils: Pupils are equal, round, and reactive to light. Neck:      Musculoskeletal: Neck supple. Trachea: No tracheal deviation. Cardiovascular:      Rate and Rhythm: Normal rate and regular rhythm. Heart sounds: Normal heart sounds. Pulmonary:      Effort: Pulmonary effort is normal. No respiratory distress. Breath sounds: Normal breath sounds. No wheezing or rales. Chest:      Chest wall: No tenderness. Abdominal:      General: Bowel sounds are normal. There is no distension. Palpations: Abdomen is soft. There is no mass. Tenderness: There is no abdominal tenderness. There is no guarding or rebound. Musculoskeletal:         General: No tenderness or deformity. Skin:     General: Skin is warm and dry. Coloration: Skin is not pale. Findings: No erythema or rash. Neurological:      Mental Status: He is alert and oriented to person, place, and time. Psychiatric:         Thought Content: Thought content normal.         Judgment: Judgment normal.         Assessment:       Diagnosis Orders   1. Essential hypertension  Comprehensive Metabolic Panel   2. Encounter for screening for HIV  HIV Screen   3. Need for hepatitis C screening test  Hepatitis C Antibody         Plan:      Mary Merlos was seen today for cough, congestion and foot pain. Diagnoses and all orders for this visit:    Essential hypertensioninitiate triple therapy via olmesartan, Norvasc and hydrochlorothiazide. Dyspepsia Protonix 40 mg orally daily. Depression: Continue Celexa 20 mg orally daily. Return in about 3 weeks (around 6/1/2021).       Wing Thanh MD         Please note, this report has been partially produced using speech recognition software  and may cause  and /or contain errors related to that system including

## 2021-05-17 ENCOUNTER — TELEPHONE (OUTPATIENT)
Dept: FAMILY MEDICINE CLINIC | Age: 41
End: 2021-05-17

## 2021-05-17 RX ORDER — AMLODIPINE, VALSARTAN AND HYDROCHLOROTHIAZIDE 10; 320; 25 MG/1; MG/1; MG/1
TABLET ORAL
Qty: 30 TABLET | Refills: 3 | Status: SHIPPED | OUTPATIENT
Start: 2021-05-17 | End: 2021-07-20 | Stop reason: SDUPTHER

## 2021-05-17 NOTE — TELEPHONE ENCOUNTER
pharm called stating amLODIPine-valsartan-HCTZ -25 MG TABS is not available. They are asking for alternative Rx please.

## 2021-06-21 RX ORDER — CITALOPRAM 20 MG/1
TABLET ORAL
Qty: 30 TABLET | Refills: 3 | Status: SHIPPED | OUTPATIENT
Start: 2021-06-21 | End: 2021-12-14

## 2021-07-02 RX ORDER — AMLODIPINE BESYLATE 5 MG/1
TABLET ORAL
Qty: 45 TABLET | Refills: 3 | Status: SHIPPED | OUTPATIENT
Start: 2021-07-02 | End: 2021-08-16

## 2021-07-20 RX ORDER — AMLODIPINE, VALSARTAN AND HYDROCHLOROTHIAZIDE 10; 320; 25 MG/1; MG/1; MG/1
TABLET ORAL
Qty: 30 TABLET | Refills: 3 | Status: SHIPPED | OUTPATIENT
Start: 2021-07-20 | End: 2021-07-21 | Stop reason: SDUPTHER

## 2021-07-21 ENCOUNTER — TELEPHONE (OUTPATIENT)
Dept: FAMILY MEDICINE CLINIC | Age: 41
End: 2021-07-21

## 2021-07-21 RX ORDER — AMLODIPINE, VALSARTAN AND HYDROCHLOROTHIAZIDE 10; 320; 25 MG/1; MG/1; MG/1
TABLET ORAL
Qty: 30 TABLET | Refills: 3 | Status: SHIPPED | OUTPATIENT
Start: 2021-07-21 | End: 2021-08-16

## 2021-07-21 NOTE — TELEPHONE ENCOUNTER
Pharmacy called about the script for amLODIPine-valsartan-HCTZ -25 MG TABS. This medication is not available to order. Did you want to send a new script? Please advise, thank you.

## 2021-07-28 ENCOUNTER — TELEPHONE (OUTPATIENT)
Dept: FAMILY MEDICINE CLINIC | Age: 41
End: 2021-07-28

## 2021-07-28 RX ORDER — VALSARTAN 320 MG/1
320 TABLET ORAL DAILY
Qty: 30 TABLET | Refills: 3 | Status: SHIPPED | OUTPATIENT
Start: 2021-07-28 | End: 2021-12-15 | Stop reason: SDUPTHER

## 2021-07-28 RX ORDER — AMLODIPINE BESYLATE 10 MG/1
10 TABLET ORAL DAILY
Qty: 30 TABLET | Refills: 3 | Status: SHIPPED | OUTPATIENT
Start: 2021-07-28 | End: 2021-12-15 | Stop reason: SDUPTHER

## 2021-07-28 RX ORDER — HYDROCHLOROTHIAZIDE 25 MG/1
25 TABLET ORAL EVERY MORNING
Qty: 30 TABLET | Refills: 5 | Status: SHIPPED | OUTPATIENT
Start: 2021-07-28 | End: 2021-12-15 | Stop reason: SDUPTHER

## 2021-07-28 NOTE — TELEPHONE ENCOUNTER
I spoke to the patient to let him know that Dr. Adalid Campo split up the 3 in 1 pill and sent it to the pharmacy that way

## 2021-08-05 RX ORDER — LISINOPRIL 10 MG/1
TABLET ORAL
Qty: 30 TABLET | Refills: 0 | Status: SHIPPED | OUTPATIENT
Start: 2021-08-05 | End: 2021-08-16

## 2021-08-05 RX ORDER — LISINOPRIL 10 MG/1
TABLET ORAL
COMMUNITY
Start: 2021-07-05 | End: 2021-08-05 | Stop reason: SDUPTHER

## 2021-08-05 RX ORDER — PANTOPRAZOLE SODIUM 40 MG/1
TABLET, DELAYED RELEASE ORAL
Qty: 30 TABLET | Refills: 2 | Status: SHIPPED | OUTPATIENT
Start: 2021-08-05 | End: 2021-12-14

## 2021-08-25 RX ORDER — LISINOPRIL 10 MG/1
TABLET ORAL
Qty: 30 TABLET | Refills: 0 | Status: SHIPPED | OUTPATIENT
Start: 2021-08-25 | End: 2021-12-15 | Stop reason: SDUPTHER

## 2021-12-14 RX ORDER — PANTOPRAZOLE SODIUM 40 MG/1
TABLET, DELAYED RELEASE ORAL
Qty: 30 TABLET | Refills: 0 | Status: SHIPPED | OUTPATIENT
Start: 2021-12-14 | End: 2021-12-15 | Stop reason: SDUPTHER

## 2021-12-14 RX ORDER — CITALOPRAM 20 MG/1
TABLET ORAL
Qty: 30 TABLET | Refills: 0 | Status: SHIPPED | OUTPATIENT
Start: 2021-12-14 | End: 2021-12-15 | Stop reason: SDUPTHER

## 2021-12-15 RX ORDER — VALSARTAN 320 MG/1
320 TABLET ORAL DAILY
Qty: 30 TABLET | Refills: 3 | Status: ON HOLD | OUTPATIENT
Start: 2021-12-15 | End: 2022-06-01 | Stop reason: SDUPTHER

## 2021-12-15 RX ORDER — ATORVASTATIN CALCIUM 20 MG/1
20 TABLET, FILM COATED ORAL DAILY
Qty: 30 TABLET | Refills: 0 | Status: ON HOLD | OUTPATIENT
Start: 2021-12-15 | End: 2022-06-01 | Stop reason: SDUPTHER

## 2021-12-15 RX ORDER — PANTOPRAZOLE SODIUM 40 MG/1
TABLET, DELAYED RELEASE ORAL
Qty: 30 TABLET | Refills: 0 | Status: ON HOLD | OUTPATIENT
Start: 2021-12-15 | End: 2022-06-01 | Stop reason: HOSPADM

## 2021-12-15 RX ORDER — LISINOPRIL 10 MG/1
TABLET ORAL
Qty: 30 TABLET | Refills: 0 | Status: ON HOLD | OUTPATIENT
Start: 2021-12-15 | End: 2022-06-01 | Stop reason: HOSPADM

## 2021-12-15 RX ORDER — AMLODIPINE BESYLATE 10 MG/1
10 TABLET ORAL DAILY
Qty: 30 TABLET | Refills: 3 | Status: ON HOLD | OUTPATIENT
Start: 2021-12-15 | End: 2022-06-01 | Stop reason: SDUPTHER

## 2021-12-15 RX ORDER — CITALOPRAM 20 MG/1
TABLET ORAL
Qty: 30 TABLET | Refills: 0 | Status: ON HOLD | OUTPATIENT
Start: 2021-12-15 | End: 2022-06-01 | Stop reason: HOSPADM

## 2021-12-15 RX ORDER — HYDROCHLOROTHIAZIDE 25 MG/1
25 TABLET ORAL EVERY MORNING
Qty: 30 TABLET | Refills: 5 | Status: ON HOLD | OUTPATIENT
Start: 2021-12-15 | End: 2022-06-01 | Stop reason: SDUPTHER

## 2021-12-15 RX ORDER — ECHINACEA PURPUREA EXTRACT 125 MG
1 TABLET ORAL PRN
Qty: 1 EACH | Refills: 2 | Status: SHIPPED | OUTPATIENT
Start: 2021-12-15 | End: 2022-09-19

## 2021-12-15 NOTE — TELEPHONE ENCOUNTER
Requesting medication refill. Please approve or deny this request.    Rx requested:  Requested Prescriptions     Pending Prescriptions Disp Refills    atorvastatin (LIPITOR) 20 MG tablet 30 tablet 0     Sig: Take 1 tablet by mouth daily    valsartan (DIOVAN) 320 MG tablet 30 tablet 3     Sig: Take 1 tablet by mouth daily    hydroCHLOROthiazide (HYDRODIURIL) 25 MG tablet 30 tablet 5     Sig: Take 1 tablet by mouth every morning    amLODIPine (NORVASC) 10 MG tablet 30 tablet 3     Sig: Take 1 tablet by mouth daily    lisinopril (PRINIVIL;ZESTRIL) 10 MG tablet 30 tablet 0     Sig: TAKE 1 TABLET BY MOUTH EVERY DAY    pantoprazole (PROTONIX) 40 MG tablet 30 tablet 0     Sig: TAKE 1 TABLET BY MOUTH EVERY DAY IN THE MORNING before BREAKFAST    citalopram (CELEXA) 20 MG tablet 30 tablet 0     Sig: TAKE 1 TABLET BY MOUTH EVERY DAY       Last Office Visit:   5/11/2021    Last Filled:      Last Labs:      Next Visit Date:  No future appointments.

## 2021-12-15 NOTE — TELEPHONE ENCOUNTER
Recent Visits    05/11/2021 Essential hypertension   [facility] Sharad Griffin MD

## 2022-05-24 ENCOUNTER — HOSPITAL ENCOUNTER (INPATIENT)
Age: 42
LOS: 8 days | Discharge: HOME | DRG: 753 | End: 2022-06-01
Attending: PSYCHIATRY & NEUROLOGY | Admitting: NURSE PRACTITIONER
Payer: COMMERCIAL

## 2022-05-24 DIAGNOSIS — F33.9 RECURRENT MAJOR DEPRESSIVE DISORDER, REMISSION STATUS UNSPECIFIED (HCC): Primary | ICD-10-CM

## 2022-05-24 LAB
ACETAMINOPHEN LEVEL: <5 UG/ML (ref 10–30)
ALBUMIN SERPL-MCNC: 3.9 G/DL (ref 3.5–4.6)
ALP BLD-CCNC: 76 U/L (ref 35–104)
ALT SERPL-CCNC: 30 U/L (ref 0–41)
AMPHETAMINE SCREEN, URINE: ABNORMAL
ANION GAP SERPL CALCULATED.3IONS-SCNC: 11 MEQ/L (ref 9–15)
AST SERPL-CCNC: 17 U/L (ref 0–40)
BACTERIA: NEGATIVE /HPF
BARBITURATE SCREEN URINE: POSITIVE
BASOPHILS ABSOLUTE: 0.1 K/UL (ref 0–0.2)
BASOPHILS RELATIVE PERCENT: 0.8 %
BENZODIAZEPINE SCREEN, URINE: POSITIVE
BILIRUB SERPL-MCNC: 0.3 MG/DL (ref 0.2–0.7)
BILIRUBIN URINE: NEGATIVE
BLOOD, URINE: NEGATIVE
BUN BLDV-MCNC: 8 MG/DL (ref 6–20)
CALCIUM SERPL-MCNC: 9.4 MG/DL (ref 8.5–9.9)
CANNABINOID SCREEN URINE: POSITIVE
CHLORIDE BLD-SCNC: 106 MEQ/L (ref 95–107)
CHOLESTEROL, TOTAL: 182 MG/DL (ref 0–199)
CLARITY: CLEAR
CO2: 23 MEQ/L (ref 20–31)
COCAINE METABOLITE SCREEN URINE: ABNORMAL
COLOR: YELLOW
CREAT SERPL-MCNC: 0.82 MG/DL (ref 0.7–1.2)
EOSINOPHILS ABSOLUTE: 0.1 K/UL (ref 0–0.7)
EOSINOPHILS RELATIVE PERCENT: 1.4 %
EPITHELIAL CELLS, UA: ABNORMAL /HPF (ref 0–5)
ETHANOL PERCENT: NORMAL G/DL
ETHANOL: <10 MG/DL (ref 0–0.08)
GFR AFRICAN AMERICAN: >60
GFR NON-AFRICAN AMERICAN: >60
GLOBULIN: 2.8 G/DL (ref 2.3–3.5)
GLUCOSE BLD-MCNC: 92 MG/DL (ref 70–99)
GLUCOSE URINE: NEGATIVE MG/DL
HCT VFR BLD CALC: 49.9 % (ref 42–52)
HDLC SERPL-MCNC: 24 MG/DL (ref 40–59)
HEMOGLOBIN: 16.9 G/DL (ref 14–18)
HYALINE CASTS: ABNORMAL /HPF (ref 0–5)
KETONES, URINE: NEGATIVE MG/DL
LDL CHOLESTEROL CALCULATED: 115 MG/DL (ref 0–129)
LEUKOCYTE ESTERASE, URINE: ABNORMAL
LYMPHOCYTES ABSOLUTE: 2.1 K/UL (ref 1–4.8)
LYMPHOCYTES RELATIVE PERCENT: 29.4 %
Lab: ABNORMAL
MCH RBC QN AUTO: 34.2 PG (ref 27–31.3)
MCHC RBC AUTO-ENTMCNC: 33.9 % (ref 33–37)
MCV RBC AUTO: 100.8 FL (ref 80–100)
METHADONE SCREEN, URINE: ABNORMAL
MONOCYTES ABSOLUTE: 0.6 K/UL (ref 0.2–0.8)
MONOCYTES RELATIVE PERCENT: 8.2 %
NEUTROPHILS ABSOLUTE: 4.3 K/UL (ref 1.4–6.5)
NEUTROPHILS RELATIVE PERCENT: 60.2 %
NITRITE, URINE: NEGATIVE
OPIATE SCREEN URINE: ABNORMAL
OXYCODONE URINE: ABNORMAL
PDW BLD-RTO: 14.4 % (ref 11.5–14.5)
PH UA: 5 (ref 5–9)
PHENCYCLIDINE SCREEN URINE: ABNORMAL
PLATELET # BLD: 176 K/UL (ref 130–400)
POTASSIUM SERPL-SCNC: 4.3 MEQ/L (ref 3.4–4.9)
PROPOXYPHENE SCREEN: ABNORMAL
PROTEIN UA: NEGATIVE MG/DL
RBC # BLD: 4.95 M/UL (ref 4.7–6.1)
RBC UA: ABNORMAL /HPF (ref 0–5)
REASON FOR REJECTION: NORMAL
REJECTED TEST: 5506
SALICYLATE, SERUM: <0.3 MG/DL (ref 15–30)
SARS-COV-2, NAAT: NOT DETECTED
SODIUM BLD-SCNC: 140 MEQ/L (ref 135–144)
SPECIFIC GRAVITY UA: 1.02 (ref 1–1.03)
TOTAL CK: 37 U/L (ref 0–190)
TOTAL PROTEIN: 6.7 G/DL (ref 6.3–8)
TRIGL SERPL-MCNC: 213 MG/DL (ref 0–150)
TSH SERPL DL<=0.05 MIU/L-ACNC: 1.54 UIU/ML (ref 0.44–3.86)
URINE REFLEX TO CULTURE: YES
UROBILINOGEN, URINE: 0.2 E.U./DL
WBC # BLD: 7.2 K/UL (ref 4.8–10.8)
WBC UA: ABNORMAL /HPF (ref 0–5)

## 2022-05-24 PROCEDURE — 80143 DRUG ASSAY ACETAMINOPHEN: CPT

## 2022-05-24 PROCEDURE — 36415 COLL VENOUS BLD VENIPUNCTURE: CPT

## 2022-05-24 PROCEDURE — 85025 COMPLETE CBC W/AUTO DIFF WBC: CPT

## 2022-05-24 PROCEDURE — 81001 URINALYSIS AUTO W/SCOPE: CPT

## 2022-05-24 PROCEDURE — 6370000000 HC RX 637 (ALT 250 FOR IP): Performed by: STUDENT IN AN ORGANIZED HEALTH CARE EDUCATION/TRAINING PROGRAM

## 2022-05-24 PROCEDURE — 87635 SARS-COV-2 COVID-19 AMP PRB: CPT

## 2022-05-24 PROCEDURE — 82077 ASSAY SPEC XCP UR&BREATH IA: CPT

## 2022-05-24 PROCEDURE — 99285 EMERGENCY DEPT VISIT HI MDM: CPT

## 2022-05-24 PROCEDURE — 6370000000 HC RX 637 (ALT 250 FOR IP): Performed by: NURSE PRACTITIONER

## 2022-05-24 PROCEDURE — 2580000003 HC RX 258

## 2022-05-24 PROCEDURE — 96372 THER/PROPH/DIAG INJ SC/IM: CPT

## 2022-05-24 PROCEDURE — 80053 COMPREHEN METABOLIC PANEL: CPT

## 2022-05-24 PROCEDURE — 6360000002 HC RX W HCPCS: Performed by: STUDENT IN AN ORGANIZED HEALTH CARE EDUCATION/TRAINING PROGRAM

## 2022-05-24 PROCEDURE — 87086 URINE CULTURE/COLONY COUNT: CPT

## 2022-05-24 PROCEDURE — 80061 LIPID PANEL: CPT

## 2022-05-24 PROCEDURE — 82550 ASSAY OF CK (CPK): CPT

## 2022-05-24 PROCEDURE — 1240000000 HC EMOTIONAL WELLNESS R&B

## 2022-05-24 PROCEDURE — 84443 ASSAY THYROID STIM HORMONE: CPT

## 2022-05-24 PROCEDURE — 80179 DRUG ASSAY SALICYLATE: CPT

## 2022-05-24 PROCEDURE — 80307 DRUG TEST PRSMV CHEM ANLYZR: CPT

## 2022-05-24 RX ORDER — ACETAMINOPHEN 325 MG/1
650 TABLET ORAL EVERY 4 HOURS PRN
Status: DISCONTINUED | OUTPATIENT
Start: 2022-05-24 | End: 2022-06-01 | Stop reason: HOSPADM

## 2022-05-24 RX ORDER — METRONIDAZOLE 500 MG/1
2000 TABLET ORAL ONCE
Status: COMPLETED | OUTPATIENT
Start: 2022-05-24 | End: 2022-05-24

## 2022-05-24 RX ORDER — HALOPERIDOL 5 MG/ML
5 INJECTION INTRAMUSCULAR EVERY 6 HOURS PRN
Status: DISCONTINUED | OUTPATIENT
Start: 2022-05-24 | End: 2022-06-01 | Stop reason: HOSPADM

## 2022-05-24 RX ORDER — HYDROXYZINE HYDROCHLORIDE 50 MG/ML
50 INJECTION, SOLUTION INTRAMUSCULAR EVERY 6 HOURS PRN
Status: DISCONTINUED | OUTPATIENT
Start: 2022-05-24 | End: 2022-06-01 | Stop reason: HOSPADM

## 2022-05-24 RX ORDER — CHLORDIAZEPOXIDE HYDROCHLORIDE 25 MG/1
25 CAPSULE, GELATIN COATED ORAL EVERY 6 HOURS PRN
Status: DISCONTINUED | OUTPATIENT
Start: 2022-05-24 | End: 2022-06-01 | Stop reason: HOSPADM

## 2022-05-24 RX ORDER — HYDROXYZINE PAMOATE 50 MG/1
50 CAPSULE ORAL ONCE
Status: COMPLETED | OUTPATIENT
Start: 2022-05-24 | End: 2022-05-24

## 2022-05-24 RX ORDER — AZITHROMYCIN 500 MG/1
1000 TABLET, FILM COATED ORAL ONCE
Status: COMPLETED | OUTPATIENT
Start: 2022-05-24 | End: 2022-05-24

## 2022-05-24 RX ORDER — CLONIDINE HYDROCHLORIDE 0.1 MG/1
0.3 TABLET ORAL ONCE
Status: COMPLETED | OUTPATIENT
Start: 2022-05-24 | End: 2022-05-24

## 2022-05-24 RX ORDER — HALOPERIDOL 5 MG
5 TABLET ORAL EVERY 6 HOURS PRN
Status: DISCONTINUED | OUTPATIENT
Start: 2022-05-24 | End: 2022-06-01 | Stop reason: HOSPADM

## 2022-05-24 RX ORDER — HYDROXYZINE PAMOATE 50 MG/1
50 CAPSULE ORAL EVERY 6 HOURS PRN
Status: DISCONTINUED | OUTPATIENT
Start: 2022-05-24 | End: 2022-06-01 | Stop reason: HOSPADM

## 2022-05-24 RX ORDER — NICOTINE 21 MG/24HR
1 PATCH, TRANSDERMAL 24 HOURS TRANSDERMAL DAILY
Status: DISCONTINUED | OUTPATIENT
Start: 2022-05-25 | End: 2022-05-29

## 2022-05-24 RX ORDER — MAGNESIUM HYDROXIDE/ALUMINUM HYDROXICE/SIMETHICONE 120; 1200; 1200 MG/30ML; MG/30ML; MG/30ML
30 SUSPENSION ORAL EVERY 6 HOURS PRN
Status: DISCONTINUED | OUTPATIENT
Start: 2022-05-24 | End: 2022-06-01 | Stop reason: HOSPADM

## 2022-05-24 RX ORDER — TRAZODONE HYDROCHLORIDE 50 MG/1
50 TABLET ORAL NIGHTLY PRN
Status: DISCONTINUED | OUTPATIENT
Start: 2022-05-24 | End: 2022-06-01 | Stop reason: HOSPADM

## 2022-05-24 RX ORDER — CEFTRIAXONE 1 G/1
500 INJECTION, POWDER, FOR SOLUTION INTRAMUSCULAR; INTRAVENOUS ONCE
Status: COMPLETED | OUTPATIENT
Start: 2022-05-24 | End: 2022-05-24

## 2022-05-24 RX ORDER — POLYETHYLENE GLYCOL 3350 17 G/17G
17 POWDER, FOR SOLUTION ORAL DAILY PRN
Status: DISCONTINUED | OUTPATIENT
Start: 2022-05-24 | End: 2022-06-01 | Stop reason: HOSPADM

## 2022-05-24 RX ADMIN — CHLORDIAZEPOXIDE HYDROCHLORIDE 25 MG: 25 CAPSULE ORAL at 22:01

## 2022-05-24 RX ADMIN — TRAZODONE HYDROCHLORIDE 50 MG: 50 TABLET ORAL at 22:01

## 2022-05-24 RX ADMIN — CEFTRIAXONE SODIUM 500 MG: 1 INJECTION, POWDER, FOR SOLUTION INTRAMUSCULAR; INTRAVENOUS at 15:02

## 2022-05-24 RX ADMIN — HYDROXYZINE PAMOATE 50 MG: 50 CAPSULE ORAL at 14:32

## 2022-05-24 RX ADMIN — WATER: 1 INJECTION, SOLUTION INTRAMUSCULAR; INTRAVENOUS; SUBCUTANEOUS at 15:28

## 2022-05-24 RX ADMIN — AZITHROMYCIN MONOHYDRATE 1000 MG: 500 TABLET ORAL at 14:39

## 2022-05-24 RX ADMIN — CLONIDINE HYDROCHLORIDE 0.3 MG: 0.1 TABLET ORAL at 14:32

## 2022-05-24 RX ADMIN — METRONIDAZOLE 2000 MG: 500 TABLET ORAL at 14:39

## 2022-05-24 ASSESSMENT — ENCOUNTER SYMPTOMS
NAUSEA: 0
VOMITING: 0
ABDOMINAL PAIN: 0
SHORTNESS OF BREATH: 0
COUGH: 0
WHEEZING: 0
PHOTOPHOBIA: 0

## 2022-05-24 ASSESSMENT — PAIN - FUNCTIONAL ASSESSMENT: PAIN_FUNCTIONAL_ASSESSMENT: NONE - DENIES PAIN

## 2022-05-24 ASSESSMENT — SLEEP AND FATIGUE QUESTIONNAIRES
DO YOU USE A SLEEP AID: NO
DO YOU HAVE DIFFICULTY SLEEPING: YES
SLEEP PATTERN: DIFFICULTY FALLING ASLEEP;DISTURBED/INTERRUPTED SLEEP
AVERAGE NUMBER OF SLEEP HOURS: 3

## 2022-05-24 NOTE — ED PROVIDER NOTES
[address] [facility] ED  EMERGENCY DEPARTMENT ENCOUNTER      Pt Name: Breanna Johns  MRN: [id number]  [place] 1980  Date of evaluation: 5/24/2022  Provider: Xuan Kirk Dr       Chief Complaint   Patient presents with    Psychiatric Evaluation     pt states he is having thoughts of hurting himself or people (no plan). Pt states he can't handle this anymore. Pt states this is going on for months          HISTORY OF PRESENT ILLNESS   (Location/Symptom, Timing/Onset, Context/Setting, Quality, Duration, Modifying Factors, Severity)  Note limiting factors. Breanna Johns is a 42 y.o. male who presents to the emergency department for psychiatric evaluation. Pt came from home. States he is having anger outbursts and destroying things in his home. Feels he blacks out when he is angry. Was kicked out by his fiance. He states he has had passive thoughts of suicide, none currently. No plan. Worried he might hurt someone when angry but denies HI. Denies AVH. States he feels anxious currently. He reports taking xanax from a friend. Hx of alcoholism, has been sober for two weeks, was at [facility]. Pt concerned for STIs, states he was treated while at [facility] but when he was released he had intercourse with his fiance and developed white penile discharge and dysuria shortly after. He would like to be treated again. He denies fever chills abd pain testicular pain or swelling penile pruritis, sores or lesions hematuria flank pain. HPI    Nursing Notes were reviewed. REVIEW OF SYSTEMS    (2-9 systems for level 4, 10 or more for level 5)     Review of Systems   Constitutional: Negative for chills and fever. HENT: Negative for congestion. Eyes: Negative for photophobia. Respiratory: Negative for cough, shortness of breath and wheezing. Cardiovascular: Negative for chest pain and palpitations.    Gastrointestinal: Negative for abdominal pain, nausea and vomiting. Genitourinary: Positive for dysuria and penile discharge. Negative for decreased urine volume, flank pain, frequency, hematuria, penile swelling, scrotal swelling, testicular pain and urgency. Musculoskeletal: Negative for myalgias. Allergic/Immunologic: Negative for immunocompromised state. Neurological: Negative for dizziness, weakness and headaches. All other systems reviewed and are negative. Except as noted above the remainder of the review of systems was reviewed and negative.        PAST MEDICAL HISTORY     Past Medical History:   Diagnosis Date    Depression     Hypertension 2015    trx with pills x 1 month / patient did not follow up    Smoker          SURGICAL HISTORY       Past Surgical History:   Procedure Laterality Date    NC CREATE EARDRUM OPENING,GEN ANESTH Bilateral 2/8/2018    BILATERAL MYRINGOTOMY WITH VENTILING TUBE INSERTION (PAT DONE 1/22/18) performed by Malena Duarte MD at [facility]       Previous Medications    ALBUTEROL SULFATE  (90 BASE) MCG/ACT INHALER    Inhale 2 puffs every 6 hours as needed for wheezing    AMLODIPINE (NORVASC) 10 MG TABLET    Take 1 tablet by mouth daily    ATORVASTATIN (LIPITOR) 20 MG TABLET    Take 1 tablet by mouth daily    CITALOPRAM (CELEXA) 20 MG TABLET    TAKE 1 TABLET BY MOUTH EVERY DAY    FLUTICASONE (FLONASE) 50 MCG/ACT NASAL SPRAY    1 spray by Nasal route daily    HYDROCHLOROTHIAZIDE (HYDRODIURIL) 25 MG TABLET    Take 1 tablet by mouth every morning    LISINOPRIL (PRINIVIL;ZESTRIL) 10 MG TABLET    TAKE 1 TABLET BY MOUTH EVERY DAY    LORATADINE (CLARITIN) 10 MG TABLET    TAKE 1 TABLET BY MOUTH EVERY DAY    PANTOPRAZOLE (PROTONIX) 40 MG TABLET    TAKE 1 TABLET BY MOUTH EVERY DAY IN THE MORNING before BREAKFAST    SODIUM CHLORIDE (ALTAMIST SPRAY) 0.65 % NASAL SPRAY    1 spray by Nasal route as needed for Congestion    VALSARTAN (DIOVAN) 320 MG TABLET    Take 1 tablet by mouth daily       ALLERGIES     Patient has no known allergies. FAMILY HISTORY       Family History   Problem Relation Age of Onset    High Blood Pressure Mother     Heart Disease Mother     Other Mother         copd / smoker    Stroke Father     High Blood Pressure Brother     Other Brother         anxiety          SOCIAL HISTORY       Social History     Socioeconomic History    Marital status: Single     Spouse name: None    Number of children: None    Years of education: None    Highest education level: None   Occupational History    None   Tobacco Use    Smoking status: Current Every Day Smoker     Packs/day: 1.00     Years: 20.00     Pack years: 20.00     Types: Cigarettes    Smokeless tobacco: Never Used   Substance and Sexual Activity    Alcohol use: Not Currently     Alcohol/week: 6.0 standard drinks     Types: 6 Cans of beer per week     Comment: 6 pkg per day    Drug use: Yes     Types: Marijuana (Weed)     Comment: 2-3 x per week and benzos     Sexual activity: None   Other Topics Concern    None   Social History Narrative    None     Social Determinants of Health     Financial Resource Strain:     Difficulty of Paying Living Expenses: Not on file   Food Insecurity:     Worried About Running Out of Food in the Last Year: Not on file    Kehinde of Food in the Last Year: Not on file   Transportation Needs:     Lack of Transportation (Medical): Not on file    Lack of Transportation (Non-Medical):  Not on file   Physical Activity:     Days of Exercise per Week: Not on file    Minutes of Exercise per Session: Not on file   Stress:     Feeling of Stress : Not on file   Social Connections:     Frequency of Communication with Friends and Family: Not on file    Frequency of Social Gatherings with Friends and Family: Not on file    Attends Judaism Services: Not on file    Active Member of Clubs or Organizations: Not on file    Attends Club or Organization Meetings: Not on file  Marital Status: Not on file   Intimate Partner Violence:     Fear of Current or Ex-Partner: Not on file    Emotionally Abused: Not on file    Physically Abused: Not on file    Sexually Abused: Not on file   Housing Stability:     Unable to Pay for Housing in the Last Year: Not on file    Number of [place] in the Last Year: Not on file    Unstable Housing in the Last Year: Not on file       SCREENINGS         [place] Coma Scale  Eye Opening: Spontaneous  Best Verbal Response: Oriented  Best Motor Response: Obeys commands  Dougie Coma Scale Score: 15                     CIWA Assessment  BP: 119/76  Heart Rate: 65                 PHYSICAL EXAM    (up to 7 for level 4, 8 or more for level 5)     ED Triage Vitals [05/24/22 1328]   BP Temp Temp src Pulse Resp SpO2 Height Weight   (!) 166/103 98.3 °F (36.8 °C) -- 77 18 99 % 6' 4\" (1.93 m) 260 lb (117.9 kg)       Physical Exam  Constitutional:       General: He is not in acute distress. Appearance: He is well-developed. He is not toxic-appearing. HENT:      Head: Normocephalic and atraumatic. Nose: Nose normal.      Mouth/Throat:      Mouth: Mucous membranes are moist.   Eyes:      Pupils: Pupils are equal, round, and reactive to light. Cardiovascular:      Rate and Rhythm: Normal rate and regular rhythm. Heart sounds: No murmur heard. No friction rub. No gallop. Pulmonary:      Effort: Pulmonary effort is normal.      Breath sounds: Normal breath sounds. No wheezing or rhonchi. Abdominal:      General: Bowel sounds are normal. There is no distension. Palpations: Abdomen is soft. There is no mass. Tenderness: There is abdominal tenderness. There is no right CVA tenderness, left CVA tenderness, guarding or rebound. Genitourinary:     Comments: Exam deferred  Musculoskeletal:         General: No swelling. Cervical back: Normal range of motion. Skin:     General: Skin is warm and dry.       Capillary Refill: Capillary refill takes less than 2 seconds. Neurological:      General: No focal deficit present. Mental Status: He is alert and oriented to person, place, and time. Psychiatric:         Attention and Perception: Attention normal.         Mood and Affect: Mood normal.         Speech: Speech normal.         Behavior: Behavior is cooperative. Thought Content: Thought content does not include homicidal or suicidal ideation. Thought content does not include homicidal or suicidal plan.          DIAGNOSTIC RESULTS     EKG: All EKG's are interpreted by the Emergency Department Physician who either signs or Co-signs this chart in the absence of a cardiologist.      RADIOLOGY:   Non-plain film images such as CT, Ultrasound and MRI are read by the radiologist. Plain radiographic images are visualized and preliminarily interpreted by the emergency physician with the below findings:      Interpretation per the Radiologist below, if available at the time of this note:    No orders to display         ED BEDSIDE ULTRASOUND:   Performed by ED Physician - none    LABS:  Labs Reviewed   ACETAMINOPHEN LEVEL - Abnormal; Notable for the following components:       Result Value    Acetaminophen Level <5 (*)     All other components within normal limits   CBC WITH AUTO DIFFERENTIAL - Abnormal; Notable for the following components:    .8 (*)     MCH 34.2 (*)     All other components within normal limits   LIPID PANEL - Abnormal; Notable for the following components:    Triglycerides 213 (*)     HDL 24 (*)     All other components within normal limits   SALICYLATE LEVEL - Abnormal; Notable for the following components:    Salicylate, Serum <7.1 (*)     All other components within normal limits   URINE DRUG SCREEN - Abnormal; Notable for the following components:    Barbiturate Screen, Ur POSITIVE (*)     Benzodiazepine Screen, Urine POSITIVE (*)     Cannabinoid Scrn, Ur POSITIVE (*)     All other components within normal limits URINALYSIS WITH REFLEX TO CULTURE - Abnormal; Notable for the following components:    Leukocyte Esterase, Urine SMALL (*)     All other components within normal limits   MICROSCOPIC URINALYSIS - Abnormal; Notable for the following components:    WBC, UA 10-20 (*)     All other components within normal limits   COVID-19, RAPID   C.TRACHOMATIS N.GONORRHOEAE DNA, URINE   CULTURE, URINE   CK   COMPREHENSIVE METABOLIC PANEL   ETHANOL   TSH   SPECIMEN REJECTION       All other labs were within normal range or not returned as of this dictation. EMERGENCY DEPARTMENT COURSE and DIFFERENTIAL DIAGNOSIS/MDM:   Vitals:    Vitals:    05/24/22 1328 05/24/22 1423 05/24/22 1539 05/24/22 1818   BP: (!) 166/103 (!) 170/111 (!) 143/93 119/76   Pulse: 77 75 71 65   Resp: 18 16  16   Temp: 98.3 °F (36.8 °C)  98.4 °F (36.9 °C)    SpO2: 99% 100% 96% 97%   Weight: 260 lb (117.9 kg)      Height: 6' 4\" (1.93 m)        MDM    Pt given STI ppx in the ED. Urine GC, trichomonas pending. Medically cleared for psychiatry evaluation. Pt to be admitted to the 3W psychiatry unit with dx of major depression. Stable for admission. REASSESSMENT          CRITICAL CARE TIME   Total Critical Care time was 0 minutes, excluding separately reportable procedures. There was a high probability of clinically significant/life threatening deterioration in the patient's condition which required my urgent intervention. CONSULTS:  None    PROCEDURES:  Unless otherwise noted below, none     Procedures        FINAL IMPRESSION      1. Recurrent major depressive disorder, remission status unspecified ([facility]          DISPOSITION/PLAN   DISPOSITION Decision To Admit 05/24/2022 06:46:07 PM      PATIENT REFERRED TO:  No follow-up provider specified. DISCHARGE MEDICATIONS:  New Prescriptions    No medications on file     Controlled Substances Monitoring:     No flowsheet data found.     (Please note that portions of this note were completed with a voice recognition program.  Efforts were made to edit the dictations but occasionally words are mis-transcribed.)    Dev Contreras PA-C (electronically signed)             Dev Contreras PA-C  05/24/22 7944

## 2022-05-24 NOTE — ED NOTES
Call placed to lab and notified Isabelle Soto of add on lab. States more urine will need to be sent to lab for send out.       Elena Trevino RN  05/24/22 0599

## 2022-05-24 NOTE — ED NOTES
Urine specimen obtained & sent to lab. Changed into [address] [place] [address] clothing. Skin check done with no skin breakdown noted. Contraband check done. Oriented to KACEY. Verbalizes understanding.      Kandace Simmons RN  05/24/22 0413

## 2022-05-24 NOTE — ED NOTES
Provisional Diagnosis:    Substance Induced Mood Disorder    Psychosocial and Contextual Factors:  Currently homeless since release from [facility] last Tuesday for unpaid child support. Patient has three children (22,16, & 6years of age) Reports he has not had a steady job for the last eight years. Reports no income, but has applied for social security, & has been denied three times. C-SSRS Summary:     Patient: C-SSRS Suicide Screening  1) Within the past month, have you wished you were dead or wished you could go to sleep and not wake up? : Yes  2) Have you actually had any thoughts of killing yourself? : Yes  3) Have you been thinking about how you might kill yourself? : Yes  4) Have you had these thoughts and had some intention of acting on them? : Yes  5) Have you started to work out or worked out the details of how to kill yourself? Do you intend to carry out this plan? : Yes  6) Have you ever done anything, started to do anything, or prepared to do anything to end your life?: Yes  Risk of Suicide: High Risk    Family: No family and/or friends in the ED. Reports limited support system. Agency: No current outpatient mental health providers. Reports history of inpatient detox treatment for alcohol abuse & was released from [address] [place] [address] last month. C-SSRS Risk Assessment  Suicidal and Self-Injurious Behavior : Denies suicidal and self-injurious behavior  Suicidal Ideation (Most Severe in Past Month): Suicidal thoughts with method (but without specific plan or intent to act)  Activating Events (Recent): Recent loss(es) or other significant negative event(s) (legal, financial, relationship, etc.),Pending incarceration or homelessness  Treatment History: Previous psychiatric diagnosis and treatments,Noncompliant with treatment  Clinical Status (Recent):  Hopelessness,Substance abuse or dependence,Agitation or severe anxiety,Refuses or feels unable to agree to safety plan  Protective Factors (Recent): Identifies reasons for living,Responsibility to family or others/living with family  Other Risk Factors:  (Poor judgement & poor insight )  Other Protective Factors:  (Sought treatment)  Describe any suicidal, self injurious, or aggressive behavior (include dates): Denies all    Abuse Assessment  Physical Abuse: Denies  Verbal Abuse: Denies  Emotional abuse: Denies  Financial Abuse: Denies  Sexual abuse: Denies    Clinical Summary:  Presented to ED via [facility] after he could no longer stay with his friend. Reports increased depression, anxiety, & suicidal ideation with no plan. Denies history of suicidal gestures. Reports fleeting homocidal ideation towards no specific person. Denies homocidal plan. States, \"it could be anyone who I get mad at\". Reports depression & anxiety D/T  multiple stressors,e.g. no income, no housing, & legal issues. Patient reports medication noncompliance with all medications for the last two months. Denies A/V hallucinations. No delusions or paranoia noted. Reports disturbed sleep & denies appetite disturbances.       Level of Care Disposition:  Pending    Per Dr. Birdia Riedel, RN  05/24/22 [facility]  05/24/22 5139

## 2022-05-24 NOTE — ED TRIAGE NOTES
Pt states he has been having si/hi thoughts (for months)  No plans   Pt was in rehab for drinking   Pt states he takes benzos   Pt states he feels alone and isolated   Pt denies pain   Pt has a steady gait   Pt is afebrile

## 2022-05-24 NOTE — ED NOTES
Notified of [facility] admit. Verbalizes understanding. Patient signed voluntary [facility] Admission consent.      Sommer Weber RN  05/24/22 9086

## 2022-05-25 LAB
EKG ATRIAL RATE: 59 BPM
EKG P AXIS: 46 DEGREES
EKG P-R INTERVAL: 146 MS
EKG Q-T INTERVAL: 404 MS
EKG QRS DURATION: 98 MS
EKG QTC CALCULATION (BAZETT): 399 MS
EKG R AXIS: 48 DEGREES
EKG T AXIS: 33 DEGREES
EKG VENTRICULAR RATE: 59 BPM

## 2022-05-25 PROCEDURE — 6370000000 HC RX 637 (ALT 250 FOR IP): Performed by: NURSE PRACTITIONER

## 2022-05-25 PROCEDURE — 93005 ELECTROCARDIOGRAM TRACING: CPT | Performed by: NURSE PRACTITIONER

## 2022-05-25 PROCEDURE — 6370000000 HC RX 637 (ALT 250 FOR IP): Performed by: PSYCHIATRY & NEUROLOGY

## 2022-05-25 PROCEDURE — 99223 1ST HOSP IP/OBS HIGH 75: CPT | Performed by: PSYCHIATRY & NEUROLOGY

## 2022-05-25 PROCEDURE — 1240000000 HC EMOTIONAL WELLNESS R&B

## 2022-05-25 RX ORDER — ATORVASTATIN CALCIUM 20 MG/1
20 TABLET, FILM COATED ORAL DAILY
Status: DISCONTINUED | OUTPATIENT
Start: 2022-05-25 | End: 2022-06-01 | Stop reason: HOSPADM

## 2022-05-25 RX ORDER — QUETIAPINE FUMARATE 50 MG/1
100 TABLET, EXTENDED RELEASE ORAL NIGHTLY
Status: DISCONTINUED | OUTPATIENT
Start: 2022-05-25 | End: 2022-05-28

## 2022-05-25 RX ORDER — DIVALPROEX SODIUM 250 MG/1
250 TABLET, DELAYED RELEASE ORAL EVERY 8 HOURS SCHEDULED
Status: DISCONTINUED | OUTPATIENT
Start: 2022-05-25 | End: 2022-06-01 | Stop reason: HOSPADM

## 2022-05-25 RX ORDER — VALSARTAN 160 MG/1
320 TABLET ORAL DAILY
Status: DISCONTINUED | OUTPATIENT
Start: 2022-05-25 | End: 2022-06-01 | Stop reason: HOSPADM

## 2022-05-25 RX ORDER — AMLODIPINE BESYLATE 10 MG/1
10 TABLET ORAL DAILY
Status: DISCONTINUED | OUTPATIENT
Start: 2022-05-25 | End: 2022-06-01 | Stop reason: HOSPADM

## 2022-05-25 RX ORDER — HYDROCHLOROTHIAZIDE 12.5 MG/1
25 TABLET ORAL EVERY MORNING
Status: DISCONTINUED | OUTPATIENT
Start: 2022-05-25 | End: 2022-06-01 | Stop reason: HOSPADM

## 2022-05-25 RX ADMIN — VALSARTAN 320 MG: 160 TABLET, FILM COATED ORAL at 13:34

## 2022-05-25 RX ADMIN — QUETIAPINE FUMARATE 100 MG: 50 TABLET, EXTENDED RELEASE ORAL at 21:18

## 2022-05-25 RX ADMIN — DIVALPROEX SODIUM 250 MG: 250 TABLET, DELAYED RELEASE ORAL at 21:18

## 2022-05-25 RX ADMIN — AMLODIPINE BESYLATE 10 MG: 10 TABLET ORAL at 13:34

## 2022-05-25 RX ADMIN — HYDROCHLOROTHIAZIDE 25 MG: 12.5 TABLET ORAL at 13:34

## 2022-05-25 RX ADMIN — ATORVASTATIN CALCIUM 20 MG: 20 TABLET, FILM COATED ORAL at 13:34

## 2022-05-25 RX ADMIN — DIVALPROEX SODIUM 250 MG: 250 TABLET, DELAYED RELEASE ORAL at 13:34

## 2022-05-25 ASSESSMENT — LIFESTYLE VARIABLES: HOW OFTEN DO YOU HAVE A DRINK CONTAINING ALCOHOL: NEVER

## 2022-05-25 NOTE — PROGRESS NOTES
Patient arrived to the unit via wheelchair accompanied by staff. Skin assessment and contraband search complete by this nurse and Germania. No contraband found.

## 2022-05-25 NOTE — PROGRESS NOTES
Patient did not attend group despite staff encouragement.   Electronically signed by Elaina Gutierres on 5/25/2022 at 12:15 PM

## 2022-05-25 NOTE — CARE COORDINATION
BHI Biopsychosocial Assessment    Current Level of Psychosocial Functioning     Independent   Dependent x  Minimal Assist     Comments:  Patient stated he is homeless and receives some government assistance. No employment at this time. Psychosocial High Risk Factors (check all that apply)    Unable to obtain meds   Chronic illness/pain    Substance abuse   Lack of Family Support   Financial stress x  Isolation   Inadequate Community Resources x  Suicide attempt(s)  Not taking medications x  Victim of crime   Developmental Delay  Unable to manage personal needs    Age 72 or older   Homeless x  No transportation   Readmission within 30 days  Unemployment x  Traumatic Event    Comments: Patient has at least five high risk factors associated with this admission. Psychiatric Advanced Directives: None Reported. Family to Involve in Treatment: Patient stated he has no one for staff to contact on his behalf. Sexual Orientation:  Patient is not in either a hetero or homosexual relationship at this time. Patient Strengths: Patient was cooperative and engaging. Patient Barriers: Patient stated he is homeless. Opiate Education Provided:  N/A    CMHC/mental health history: None Reported. Plan of Care   medication management, group/individual therapies, family meetings, psycho -education, treatment team meetings to assist with stabilization    Initial Discharge Plan:  Patient is not sure about his discharge location. Clinical Summary:    Patient is a 42year old male who was admitted to the [facility] due to a mental health decompensation and the need for a psychiatric evaluation. Reportedly, patient was having angry outbursts and destroying things at his home. When interviewed, patient expressed some concern about his behaviors and wondered if he did not have a place to live anymore. Patient indicated that he is not affiliated with a mental health organization.  He is hoping to get linked to [facility] and signed consent for  to start the process. Patient appeared remorseful for his actions and stated his goal is to get back on medication during this admission. Patient was cooperative and engaging during the assessment.      Electronically signed by GIANNA Perez on 5/25/2022 at 12:56 PM

## 2022-05-25 NOTE — H&P
[facility] - Department of Psychiatry    History and Physical - Adult         CHIEF COMPLAINT:  Depression HI, SI    History obtained from:  patient    Patient was seen after discussing with the treatment team and reviewing the chart        CIRCUMSTANCES OF ADMISSION:     Presented to ED via Squad after he could no longer stay with his friend. Reports increased depression, anxiety, & suicidal ideation with no plan. Denies history of suicidal gestures. Reports fleeting homocidal ideation towards no specific person. Denies homocidal plan. States, \"it could be anyone who I get mad at\". Reports depression & anxiety D/T  multiple stressors,e.g. no income, no housing, & legal issues. Patient reports medication noncompliance with all medications for the last two months. Denies A/V hallucinations. No delusions or paranoia noted. Reports disturbed sleep & denies appetite disturbances. Currently homeless since release from [facility] last Tuesday for unpaid child support. Patient has three children (22,16, & 6years of age) Reports he has not had a steady job for the last eight years. Reports no income, but has applied for social security, & has been denied three times.      HISTORY OF PRESENT ILLNESS:      The patient is a 42 y.o. male single currently homeless for a week, was living with ex fisujatae with significant past history of MDD, DONTE    Pt has been feeling depressed chronically, but recently it has been worse  Pt has been getting more angry, no control over that  Destroyed the house, impulsive, not know how to control it  Mood swings racing thoughts  Has been impacting the relationship  I don't feel safe, what I am going to do next, fearful that he might harm someone out of anger, not in control  Pt has been having suicidal - nothing his helping  Thinking about various plans but did not act  Pt is more worried about harming others out of anger than hurting self  Pt was released from [facility] for non payment of child support - $ 6000. Stressors:got kicked out of her fiancee house following verbal argument, nothing physical, homeless, one child with her- 6year old. The patient is not currently receiving care for the above psychiatric illness.     Medications Prior to Admission:   Medications Prior to Admission: sodium chloride (ALTAMIST SPRAY) 0.65 % nasal spray, 1 spray by Nasal route as needed for Congestion (Patient not taking: Reported on 5/24/2022)  atorvastatin (LIPITOR) 20 MG tablet, Take 1 tablet by mouth daily (Patient not taking: Reported on 5/24/2022)  valsartan (DIOVAN) 320 MG tablet, Take 1 tablet by mouth daily (Patient not taking: Reported on 5/24/2022)  hydroCHLOROthiazide (HYDRODIURIL) 25 MG tablet, Take 1 tablet by mouth every morning (Patient not taking: Reported on 5/24/2022)  amLODIPine (NORVASC) 10 MG tablet, Take 1 tablet by mouth daily (Patient not taking: Reported on 5/24/2022)  lisinopril (PRINIVIL;ZESTRIL) 10 MG tablet, TAKE 1 TABLET BY MOUTH EVERY DAY (Patient not taking: Reported on 5/24/2022)  pantoprazole (PROTONIX) 40 MG tablet, TAKE 1 TABLET BY MOUTH EVERY DAY IN THE MORNING before BREAKFAST (Patient not taking: Reported on 5/24/2022)  citalopram (CELEXA) 20 MG tablet, TAKE 1 TABLET BY MOUTH EVERY DAY (Patient not taking: Reported on 5/24/2022)  albuterol sulfate  (90 Base) MCG/ACT inhaler, Inhale 2 puffs every 6 hours as needed for wheezing (Patient not taking: Reported on 5/24/2022)  loratadine (CLARITIN) 10 MG tablet, TAKE 1 TABLET BY MOUTH EVERY DAY (Patient not taking: Reported on 5/24/2022)  fluticasone (FLONASE) 50 MCG/ACT nasal spray, 1 spray by Nasal route daily (Patient not taking: Reported on 5/24/2022)    Compliance:no    Psychiatric Review of Systems       Depression: yes     Keeley or Hypomania:  yes      Panic Attacks:  yes      Phobias:  no     Obsessions and Compulsions:  no     PTSD : no     Hallucinations:  yes - hear voices talking to him 2 weeks ago     Delusions:  yes - paranoid thoughts ++    Substance Abuse History:  ETOH:  Alcohol use a fifth a day, not drinking for 2 weeks, was at [employer] 3 weeks ago, got out and has been doing  Marijuana: no  Opiates: no  Other Drugs: no      Past Psychiatric History:  Prior Diagnosis:  MDD, alcohlism  Psychiatrist: no  Therapist:no  Hospitalization: yes  Hx of Suicidal Attempts: yes  Hx of violence:  yes  ECT: no  Previous discontinued Psychiatric Med Trials: celexa, zoloft, buspar    Past Medical History:        Diagnosis Date    Depression     Hypertension 2015    trx with pills x 1 month / patient did not follow up    Smoker        Past Surgical History:        Procedure Laterality Date    [place] CREATE EARDRUM OPENING,GEN ANESTH Bilateral 2/8/2018    BILATERAL MYRINGOTOMY WITH VENTILING TUBE INSERTION (PAT DONE 1/22/18) performed by Evans Blue MD at [address] [place] [address]         Allergies:   Patient has no known allergies. Family History  Family History   Problem Relation Age of Onset    High Blood Pressure Mother     Heart Disease Mother     Other Mother         copd / smoker    Stroke Father     High Blood Pressure Brother     Other Brother         anxiety         Social History:  Born and Raised: [place]  Describes Childhood:   supportive  Education: [employer]  Employment: Unemployed, not seeking work  Relationships: single  Children: 3  Current Support: poor    Legal Hx: pending charges  Access to weapons?:  No      EXAMINATION:    REVIEW OF SYSTEMS:    ROS:  [x] All negative/unchanged except if checked.  Explain positive(checked items) below:  [] Constitutional  [] Eyes  [] Ear/Nose/Mouth/Throat  [] Respiratory  [] CV  [] GI  []   [] Musculoskeletal  [] Skin/Breast  [] Neurological  [] Endocrine  [] Heme/Lymph  [] Allergic/Immunologic    Explanation:     Vitals:  BP (!) 138/96   Pulse 63   Temp 97.8 °F (36.6 °C) (Oral)   Resp 20   Ht 6' 4\" (1.93 m)   Wt 260 lb (117.9 kg)   SpO2 99%   BMI 31.65 kg/m²      Neurologic Exam:   Muscle Strength & Tone: full ROM  Gait: normal gait   Involuntary Movements: No    Mental Status Examination:    Level of consciousness:  within normal limits   Appearance:  ill-appearing  Behavior/Motor:  physically aggressive  Attitude toward examiner:  withdrawn  Speech:  slow   Mood: irritable and labile  Affect:  mood incongruent  Thought processes:  coherent   Association:normal  Thought content:  Suicidal Ideation:  passive   HI - against anybody  Delusions:  ideas of reference  Perceptual Disturbance:  denies any perceptual disturbance  Cognition:  oriented to person, place, and time   Attention & Concentration poor  Memory intact  Insight poor   Judgement poor   Fund of Knowledge limited    Mini Mental Status 30/30      DIAGNOSIS:     Bipolar mixed episode severe with psychosis   DONTE  Alcohol dependence in early remission        RISK ASSESSMENT:    SUICIDE RISK ASSESSMENT:high  HOMICIDE: high  AGITATION/VIOLENCE: high  ELOPEMENT: high    LABS: REVIEWED TODAY:  Recent Labs     05/24/22  1414   WBC 7.2   HGB 16.9        Recent Labs     05/24/22  1414      K 4.3      CO2 23   BUN 8   CREATININE 0.82   GLUCOSE 92     Recent Labs     05/24/22  1414   BILITOT 0.3   ALKPHOS 76   AST 17   ALT 30     Lab Results   Component Value Date    LABAMPH Neg 05/24/2022    BARBSCNU POSITIVE 05/24/2022    LABBENZ POSITIVE 05/24/2022    LABMETH Neg 05/24/2022    OPIATESCREENURINE Neg 05/24/2022    PHENCYCLIDINESCREENURINE Neg 05/24/2022    ETOH <10 05/24/2022     Lab Results   Component Value Date    TSH 1.540 05/24/2022     No results found for: LITHIUM  No results found for: VALPROATE, CBMZ  No results found for: LITHIUM, VALPROATE    FURTHER LABS ORDERED :      Radiology   No results found.     EKG: TRACING REVIEWED    TREATMENT PLAN:    Risk Management:  close watch and suicide risk    This patient was assessed for Medical bed necessity for the following reason:  N/A    Collateral Information:  Will obtain collateral information from the family or friends. Will obtain medical records as appropriate from out patient providers  Will consult the hospitalist for a physical exam to rule out any co-morbid physical condition. Home medication Reconciled       New Medications started during this admission :    See orders  Prn Haldol 5mg and Vistaril 50mg q6hr for extreme agitation. Trazodone as ordered for insomnia  Vistaril as ordered for anxiety  Discussed with the patient risk, benefit, alternative and common side effects for the  proposed medication treatment. Patient is consenting to the treatment.     Psychotherapy:   Encourage participation in milieu and group therapy  Individual therapy as needed      Electronically signed by David Sanders MD on 5/25/2022 at 10:14 AM

## 2022-05-25 NOTE — ED NOTES
Report given to receiving NADEGE Gill on [address]. Pt assigned to  [phone].      Julian Fountain RN  05/24/22 1757

## 2022-05-25 NOTE — ED NOTES
Per Maurice Garcia from [address] [place] [address], no COVID results visible in computer. Pt reports that he had one performed earlier. Spoke with Herb from Lab and confirmed that COVID test was performed and results were NOT DETECTED. States fixed in system and results should appear shortly.       Jessica Kennedy RN  05/24/22 2017

## 2022-05-25 NOTE — PROGRESS NOTES
Patient did not attend group despite staff encouragement.   Electronically signed by Victorino Kessler on 5/25/2022 at 5:13 PM

## 2022-05-25 NOTE — CARE COORDINATION
Pt has suicidal thoughts off and on. \"All of the time for years. \"  Pt is soft spoken and vague with assessment. Pt able to smile. 5/10 depression and 7/10 anxiety. Reports restless sleep and racing thoughts at night. Good appetite.

## 2022-05-25 NOTE — CARE COORDINATION
Group Therapy Note    Date: 5/25/2022  Start Time: 1400  End Time:  9766    Number of Participants: 8    Type of Group: Cognitive Skills    Patient's Goal: To participate in mood management group. Notes: Patient declined to attend psychoeducation group at 1400 despite encouragement by staff.      Discipline Responsible: /Counselor    GIANNA Blanco

## 2022-05-25 NOTE — PROGRESS NOTES
Pt. presents with flat affect and piercing stare at times. Low monotone of voice. Feels depressed and came to ER because he felt his anger was \"out of control. \" Reports when he gets angry, it gets bad. \"I go after whatever is in my path. \" Poor sleep and fait appetite. Poor concentration and memory. Low motivation. Reports AH prior to a week ago but denies VH. Reports feeling suicidal prior to admission. Drinks ETOH daily and smokes marijuana daily. Reports using benzos but is vague with details. Blue team folder given and explained. Stressors include  1.no job/not being able to hold a job  2.not being able to accomplish things-not responsible  3. life  *fishing, go to car races  Electronically signed by Júnior Shields on 5/25/2022 at 8:15 AM

## 2022-05-25 NOTE — CARE COORDINATION
Brief Intervention and Referral to Treatment Summary    Patient was provided PHQ-9, AUDIT-C and DAST Screening:      PHQ-9 Score: 0  AUDIT-C Score: 0   DAST Score: 0     Patients substance use is considered     Low Risk/Healthy x  Moderate Risk  Harmful  Dependent    Patients depression is considered:     Minimal x  Mild   Moderate  Moderately Severe  Severe    Brief Education Was Provided N/A    Patient was receptive  Patient was not receptive      Brief Intervention Is Provided (Only for AUDIT-C or DAST) N/A    Patient reports readiness to decrease and/or stop use and a plan was discussed   Patient denies readiness to decrease and/or stop use and a plan was not discussed      Recommendations/Referrals for Brief and/or Specialized Treatment Provided to Patient   Patient denied any current concerns with drugs or alcohol. As a result, no brief education or intervention was completed.     Electronically signed by GIANNA Gunter on 5/25/2022 at 12:43 PM

## 2022-05-25 NOTE — PROGRESS NOTES
Pt. refused to attend the 1000 skills group, despite staff encouragement.  Electronically signed by Traec Nicole on 5/25/2022 at 11:26 AM

## 2022-05-25 NOTE — PROGRESS NOTES
Admission is complete. Flat affect. Cooperative with assessment. Pt reports he came to the ER for a psych evaluation. Pt states he feels out of control. For the past year he has felt more angry and at times will black out. Pt denies SI/HI and AVH. Pt reports when he is angry he hears voices. Pt admits he was arrested last week for a verbal altercation but they released him. Last Tuesday he was released from correction for unpaid child support. Pt says he has multiple stressors in his life. Pts reports his finance was cheating. Pts mom, dad and grandma have all passed away in the past year. Pt states he was using benzos and alcohol and didn't make it to the funerals to say goodbye. Pt had benzos 2 days ago with a friend. Pt reports he has not a had alcohol in 2 weeks. Pt rates his depression 5/10. Anxiety 7/10. Pt has racing thoughts and reports its never stops. Pt can not sleep at night due his \"mind not shutting off\". . Pt only sleeps 3hrs per night. Appetite is good. Pt is homeless and does not have a job. Pt also states he has no support that his kids do not want to be around him due to his angry outbursts. Pt is noncompliant with his medications.

## 2022-05-25 NOTE — PROGRESS NOTES
Patient received PRN librium for withdrawal. Pt uses benzos but reports last time he used was 2 days ago. Pts BP was elevated 143/101. Pt also requested/ received PRN trazodone for sleep. Will continue too monitor.

## 2022-05-25 NOTE — PROGRESS NOTES
Behavioral Services  Medicare Certification Upon Admission    I certify that this patient's inpatient psychiatric hospital admission is medically necessary for:    [x] (1) Treatment which could reasonably be expected to improve this patient's condition,       [x] (2) Or for diagnostic study;     AND     [x](2) The inpatient psychiatric services are provided while the individual is under the care of a physician and are included in the individualized plan of care.     Estimated length of stay/service 3-5 days    Plan for post-hospital care OP care    Electronically signed by Claire Jimenez MD on 5/25/2022 at 10:13 AM

## 2022-05-25 NOTE — PROGRESS NOTES
Pt. declined to attend the 0900 community meeting, despite staff encouragement.  GOAL : \" to feel better: Electronically signed by ARYA Doan on 5/25/2022 at 9:55 AM

## 2022-05-25 NOTE — CONSULTS
[facility]    HISTORY AND PHYSICAL EXAM    PATIENT NAME:  Josie Hameed    MRN:  [id number]  SERVICE DATE:  5/25/2022   SERVICE TIME:  9:59 AM    Primary Care Physician: Netta Dukes MD         SUBJECTIVE  CHIEF COMPLAINT:  Medically appropriate for inpatient psychiatry admission. Consult for medical H/P encounter. HPI:  This is a 42 y.o. male who presents with PMHx depression, alcohol abuse and HTN  presented to emergency room with anger outbursts and home destruction. Reports 'blacking out' when feeling angered. Reports passive suicide thoughts. No plan. Reports history of alcoholism. Has been sober x2 weeks. Has been self medicating with xanax he gets from a friend. Was treated for ppx for STI in ED as he reports penile discharge following intercourse. Cultures pending. Patient cleared from emergency room for psychiatric care. Patient Seen, Chart, Labs, Radiologystudies, and Consults reviewed. Patient denies headache, chest pain, shortness of breath, N/V/D/C, fever/chills.        PAST MEDICAL HISTORY:    Past Medical History:   Diagnosis Date    Depression     Hypertension 2015    trx with pills x 1 month / patient did not follow up    Smoker      PAST SURGICAL HISTORY:    Past Surgical History:   Procedure Laterality Date    [place] CREATE EARDRUM OPENING,GEN ANESTH Bilateral 2/8/2018    BILATERAL MYRINGOTOMY WITH VENTILING TUBE INSERTION (PAT DONE 1/22/18) performed by Erick Gallegos MD at [address] [place] [address]       FAMILY HISTORY:    Family History   Problem Relation Age of Onset    High Blood Pressure Mother     Heart Disease Mother     Other Mother         copd / smoker    Stroke Father     High Blood Pressure Brother     Other Brother         anxiety     SOCIAL HISTORY:    Social History     Socioeconomic History    Marital status: Single     Spouse name: Not on file    Number of children: Not on file    Years of education: Not on file    Highest education level: Not on file   Occupational History    Not on file   Tobacco Use    Smoking status: Current Every Day Smoker     Packs/day: 1.00     Years: 20.00     Pack years: 20.00     Types: Cigarettes    Smokeless tobacco: Never Used   Substance and Sexual Activity    Alcohol use: Not Currently     Alcohol/week: 6.0 standard drinks     Types: 6 Cans of beer per week     Comment: 6 pkg per day    Drug use: Yes     Types: Marijuana (Weed)     Comment: 2-3 x per week and benzos     Sexual activity: Not on file   Other Topics Concern    Not on file   Social History Narrative    Not on file     Social Determinants of Health     Financial Resource Strain:     Difficulty of Paying Living Expenses: Not on file   Food Insecurity:     Worried About Running Out of Food in the Last Year: Not on file    Kehinde of Food in the Last Year: Not on file   Transportation Needs:     Lack of Transportation (Medical): Not on file    Lack of Transportation (Non-Medical):  Not on file   Physical Activity:     Days of Exercise per Week: Not on file    Minutes of Exercise per Session: Not on file   Stress:     Feeling of Stress : Not on file   Social Connections:     Frequency of Communication with Friends and Family: Not on file    Frequency of Social Gatherings with Friends and Family: Not on file    Attends Faith Services: Not on file    Active Member of [address] [place] [address] or Organizations: Not on file    Attends Club or Organization Meetings: Not on file    Marital Status: Not on file   Intimate Partner Violence:     Fear of Current or Ex-Partner: Not on file    Emotionally Abused: Not on file    Physically Abused: Not on file    Sexually Abused: Not on file   Housing Stability:     Unable to Pay for Housing in the Last Year: Not on file    Number of [place] in the Last Year: Not on file    Unstable Housing in the Last Year: Not on file     MEDICATIONS:    Current Facility-Administered Medications   Medication Dose Route Frequency Provider Last Rate Last Admin    acetaminophen (TYLENOL) tablet 650 mg  650 mg Oral O0J PRN ERNESTINE Barahona CNP        polyethylene glycol (GLYCOLAX) packet 17 g  17 g Oral Daily PRN ERNESTINE Barahona CNP        traZODone (DESYREL) tablet 50 mg  50 mg Oral Nightly PRN ERNESTINE Barahona - CNP   50 mg at 05/24/22 2201    aluminum & magnesium hydroxide-simethicone (MAALOX) 200-200-20 MG/5ML suspension 30 mL  30 mL Oral I3F PRN ERNESTINE Barahona CNP        nicotine (NICODERM CQ) 21 MG/24HR 1 patch  1 patch TransDERmal Daily ERNESTINE Barahona CNP        chlordiazePOXIDE (LIBRIUM) capsule 25 mg  25 mg Oral B0P PRN ERNESTINE Barahona - CNP   25 mg at 05/24/22 2201    hydrOXYzine (VISTARIL) capsule 50 mg  50 mg Oral B6P PRN ERNESTINE Barahona CNP        Or    hydrOXYzine (VISTARIL) injection 50 mg  50 mg IntraMUSCular U2F PRN ERNESTINE Barahona CNP        haloperidol lactate (HALDOL) injection 5 mg  5 mg IntraMUSCular Z1C PRN ERNESTINE Barahona CNP        Or    haloperidol (HALDOL) tablet 5 mg  5 mg Oral Y9X PRN ERNESTINE Barahona CNP           ALLERGIES: Patient has no known allergies. REVIEW OF SYSTEM:   ROS as noted in HPI, 12 point ROS reviewed and otherwise negative.     OBJECTIVE  PHYSICAL EXAM: BP (!) 138/96   Pulse 63   Temp 97.8 °F (36.6 °C) (Oral)   Resp 20   Ht 6' 4\" (1.93 m)   Wt 260 lb (117.9 kg)   SpO2 99%   BMI 31.65 kg/m²    Vitals:    05/24/22 1818 05/24/22 2007 05/24/22 2157 05/25/22 0848   BP: 119/76 (!) 133/94 (!) 143/101 (!) 138/96   Pulse: 65 57 62 63   Resp: 16 16  20   Temp:  98.6 °F (37 °C)  97.8 °F (36.6 °C)   TempSrc:    Oral   SpO2: 97% 98%  99%   Weight:       Height:           CONSTITUTIONAL:  awake, alert, cooperative, no apparent distress, and appears stated age  EYES:  Lids and lashes normal, conjunctiva normal  ENT:  Normocephalic, without obvious abnormality, atraumatic, sinuses nontender on palpation, external ears without lesions, oral pharynx with moist mucus membranes, tonsils without erythema or exudates, gums normal and good dentition. NECK:  Supple, symmetrical, trachea midline  LUNGS: Clear to auscultation bilaterally, no crackles or wheezing  CARDIOVASCULAR:  Regular rate and rhythm, normal S1 and S2  ABDOMEN: Normal bowel sounds, soft, non-distended, non-tender  MUSCULOSKELETAL:  There is no redness, warmth, or swelling of the joints. NEUROLOGIC:  Awake, alert, oriented to name, place and time. SKIN:  Warm and dry  DATA:     Diagnostic tests reviewed for today's visit:    Most recent labs and imaging results reviewed. ASSESSMENT AND PLAN    42 y.o. male with PMH of depression, alcohol abuse and HTN  presented with: Major depression, recurrent [facility])  Patient admitted to behavorial health for evaluation and treatment     HTN: Reports not taking medications recently. Will resume home regimen. Recommend f/u with PCP    This is only a history and physical examination and not medical management. The patient is to contact and follow up with their primary care physician and go over any abnormal labs, imaging, findings, medical concerns, or conditions that we have and have not addressed during this encounter.     Plan of care discussed with: patient    SIGNATURE: ERNESTINE Solis NP  DATE: May 25, 2022  TIME: 9:59 AM

## 2022-05-26 PROBLEM — F31.64 BIPOLAR DISORD, CRNT EPISODE MIXED, SEVERE, W PSYCH FEATURES (HCC): Status: ACTIVE | Noted: 2022-05-24

## 2022-05-26 LAB — URINE CULTURE, ROUTINE: NORMAL

## 2022-05-26 PROCEDURE — 6370000000 HC RX 637 (ALT 250 FOR IP): Performed by: NURSE PRACTITIONER

## 2022-05-26 PROCEDURE — 99233 SBSQ HOSP IP/OBS HIGH 50: CPT | Performed by: PSYCHIATRY & NEUROLOGY

## 2022-05-26 PROCEDURE — 6370000000 HC RX 637 (ALT 250 FOR IP): Performed by: PSYCHIATRY & NEUROLOGY

## 2022-05-26 PROCEDURE — 1240000000 HC EMOTIONAL WELLNESS R&B

## 2022-05-26 RX ADMIN — ATORVASTATIN CALCIUM 20 MG: 20 TABLET, FILM COATED ORAL at 09:06

## 2022-05-26 RX ADMIN — AMLODIPINE BESYLATE 10 MG: 10 TABLET ORAL at 09:01

## 2022-05-26 RX ADMIN — DIVALPROEX SODIUM 250 MG: 250 TABLET, DELAYED RELEASE ORAL at 14:19

## 2022-05-26 RX ADMIN — DIVALPROEX SODIUM 250 MG: 250 TABLET, DELAYED RELEASE ORAL at 06:27

## 2022-05-26 RX ADMIN — QUETIAPINE FUMARATE 100 MG: 50 TABLET, EXTENDED RELEASE ORAL at 21:01

## 2022-05-26 RX ADMIN — DIVALPROEX SODIUM 250 MG: 250 TABLET, DELAYED RELEASE ORAL at 21:26

## 2022-05-26 RX ADMIN — HYDROXYZINE PAMOATE 50 MG: 50 CAPSULE ORAL at 15:58

## 2022-05-26 RX ADMIN — HYDROCHLOROTHIAZIDE 25 MG: 12.5 TABLET ORAL at 09:01

## 2022-05-26 RX ADMIN — VALSARTAN 320 MG: 160 TABLET, FILM COATED ORAL at 09:01

## 2022-05-26 NOTE — GROUP NOTE
Group Therapy Note    Date: 5/25/2022    Group Start Time: 2040  Group End Time: 2050  Group Topic: Wrap-Up    [facility] Calles        Group Therapy Note    Attendees: 10/19         Patient's Goal:  \"adjust to my meds\"    Notes:  Patient reported meeting their goal for the day. Patient shared he is happy he got help and is now 9 days sober.     Status After Intervention:  Unchanged    Participation Level: Interactive    Participation Quality: Appropriate, Attentive and Sharing      Speech:  normal      Thought Process/Content: Logical      Affective Functioning: Congruent      Mood: euthymic      Level of consciousness:  Alert and Attentive      Response to Learning: Progressing to goal      Endings: None Reported    Modes of Intervention: Support      Discipline Responsible: Fewzion      Signature:  Piedad Calles

## 2022-05-26 NOTE — GROUP NOTE
Group Therapy Note    Date: 5/25/2022    Group Start Time: 1900  Group End Time: 1950  Group Topic: Recreational    [facility]    Mili Welsh        Group Therapy Note    Attendees: 10/18         Patient's Goal:  To participated in activity group. Notes:  Patient participated in group with peers.      Status After Intervention:  Unchanged    Participation Level: Interactive    Participation Quality: Appropriate and Attentive      Speech:  normal      Thought Process/Content: Logical      Affective Functioning: Flat      Mood: euthymic      Level of consciousness:  Alert and Attentive      Response to Learning: Progressing to goal      Endings: None Reported    Modes of Intervention: Activity      Discipline Responsible: [address] [employer]      Signature:  Mili Welsh

## 2022-05-26 NOTE — FLOWSHEET NOTE
Pt has been visible out on the unit watching TV with peers. Pt states \" I feel tired after taking all those medications this morning\". Pt has a flat and tired affect. Pt stated he attended the morning group but states \" I just can't go to the group in that small room with all of the people\". Pt reports his racing thoughts are less frequent. Pt denies SI,HI,AVH.

## 2022-05-26 NOTE — PROGRESS NOTES
Patient did not attend group despite staff encouragement.   Electronically signed by Raymundo Crump on 5/26/2022 at 12:27 PM

## 2022-05-26 NOTE — PROGRESS NOTES
Patient did not attend group despite staff encouragement.   Electronically signed by Gabriela Jackson on 5/26/2022 at 5:03 PM

## 2022-05-26 NOTE — PROGRESS NOTES
Morning Community Meeting Topics    Musa Dowling attended the morning community meeting on 5/26/22. Topics discussed today     [x] Introduction   Day of the week and date   Mask distribution   Current mask requirements  [x]Teams   Explanation of  Green and Blue team criteria   Nurses assigned to each team for today   Explanation about green and blue paper  o Date  o Patient's Name  o Patient's Nurse  o Goals  [x] Visitation   Announce the visiting hours for the day   Announce which team is allowed to have visitors for the day   Review any updated Covid 19 requirements for visitors during visitation  o Vaccine Card or negative Covid test within 48 hours of visit  o State Identification   Patients are reminded to alert the  at least 1 hour before visitation   [x] Unit Orientation   Coffee use   Phone location and etiquette   Shower locations  [employer] and dryer location and process   Common area expectations   Staff rounds expectation  [x] Meals    Educate patient to the menu  o The patient is encouraged to fill out the menu to get preferences at mealtime  o The patient is educated that if they do not fill out the menu, they will get the standard tray  o The coffee pot is decaf, patient encouraged to order regular coffee from menu.    Educate patient to the meal process   Patient encouraged to eat snacks provided twice daily  o Snacks may stay in patient room     [x] Discharge Process   Discharge expectations   Fill out the survey after discharge   [x] Hygiene   Daily showers encouraged  o Showers availability discussed    Daily dressing encouraged  o Discussed wearing street clothing   Education provided on where to place linens and clothing  o Linens in the hamper  o personal clothing does not go into the linen hamper  [x] Group    Patient encouraged to attend group provided   Time of Group Meetings discussed   Gentle reminder that attendance is a Physician order  [x] Movement   Chair exercises completed   Stretching completed  Notes: GOAL : \" to go home\" Electronically signed by Alvin Branch on 5/26/2022 at 9:59 AM

## 2022-05-26 NOTE — PROGRESS NOTES
Pt reports depression is # 5/10, anxiety is #7/10. Denies SI/HI/AVH. Pt reports good appetite. Pt will start groups tomorrow. Pt reports not sleeping last night. Pt reports not showering yet.

## 2022-05-26 NOTE — PROGRESS NOTES
[facility]urelio Hernandez [facility]. FOLLOW-UP NOTE       5/26/2022     Patient was seen and examined in person, Chart reviewed   Patient's case discussed with staff/team    Chief Complaint: Depression SI    Interim History:     Pt report feeling depressed and anxious especially in social settings  Patient is unable to go to groups  Patient report feeling hopeless and worthless with racing thoughts  Slept better last night  Patient is ruminating about his ongoing stress  Patient is currently homeless  Appetite:   [] Normal/Unchanged  [] Increased  [x] Decreased      Sleep:       [] Normal/Unchanged  [x] Fair       [] Poor              Energy:    [] Normal/Unchanged  [] Increased  [x] Decreased        SI [x] Present  [] Absent    HI  []Present  [x] Absent     Aggression:  [] yes  [x] no    Patient is [] able  [x] unable to CONTRACT FOR SAFETY     PAST MEDICAL/PSYCHIATRIC HISTORY:   Past Medical History:   Diagnosis Date    Depression     Hypertension 2015    trx with pills x 1 month / patient did not follow up    Smoker        FAMILY/SOCIAL HISTORY:  Family History   Problem Relation Age of Onset    High Blood Pressure Mother     Heart Disease Mother     Other Mother         copd / smoker    Stroke Father     High Blood Pressure Brother     Other Brother         anxiety     Social History     Socioeconomic History    Marital status: Single     Spouse name: Not on file    Number of children: Not on file    Years of education: Not on file    Highest education level: Not on file   Occupational History    Not on file   Tobacco Use    Smoking status: Current Every Day Smoker     Packs/day: 1.00     Years: 20.00     Pack years: 20.00     Types: Cigarettes    Smokeless tobacco: Never Used   Substance and Sexual Activity    Alcohol use: Not Currently     Alcohol/week: 6.0 standard drinks     Types: 6 Cans of beer per week     Comment: 6 pkg per day    Drug use: Yes     Types: Marijuana Bryanfrreba Schneider) Comment: 2-3 x per week and benzos     Sexual activity: Not on file   Other Topics Concern    Not on file   Social History Narrative    Not on file     Social Determinants of Health     Financial Resource Strain:     Difficulty of Paying Living Expenses: Not on file   Food Insecurity:     Worried About Running Out of Food in the Last Year: Not on file    Kehinde of Food in the Last Year: Not on file   Transportation Needs:     Lack of Transportation (Medical): Not on file    Lack of Transportation (Non-Medical): Not on file   Physical Activity:     Days of Exercise per Week: Not on file    Minutes of Exercise per Session: Not on file   Stress:     Feeling of Stress : Not on file   Social Connections:     Frequency of Communication with Friends and Family: Not on file    Frequency of Social Gatherings with Friends and Family: Not on file    Attends Jewish Services: Not on file    Active Member of [address] [place] [address] [facility] or Organizations: Not on file    Attends Club or Organization Meetings: Not on file    Marital Status: Not on file   Intimate Partner Violence:     Fear of Current or Ex-Partner: Not on file    Emotionally Abused: Not on file    Physically Abused: Not on file    Sexually Abused: Not on file   Housing Stability:     Unable to Pay for Housing in the Last Year: Not on file    Number of [place] in the Last Year: Not on file    Unstable Housing in the Last Year: Not on file           ROS:  [x] All negative/unchanged except if checked.  Explain positive(checked items) below:  [] Constitutional  [] Eyes  [] Ear/Nose/Mouth/Throat  [] Respiratory  [] CV  [] GI  []   [] Musculoskeletal  [] Skin/Breast  [] Neurological  [] Endocrine  [] Heme/Lymph  [] Allergic/Immunologic    Explanation:     MEDICATIONS:    Current Facility-Administered Medications:     divalproex (DEPAKOTE) DR tablet 250 mg, 250 mg, Oral, 3 times per day, Stefan Grey MD, 250 mg at 05/26/22 1419    QUEtiapine (SEROQUEL XR) extended release tablet 100 mg, 100 mg, Oral, Nightly, Jose Koch MD, 100 mg at 05/25/22 2118    amLODIPine (NORVASC) tablet 10 mg, 10 mg, Oral, Daily, ERNESTINE Camarillo - NP, 10 mg at 05/26/22 0901    atorvastatin (LIPITOR) tablet 20 mg, 20 mg, Oral, Daily, Allie Knott APRN - NP, 20 mg at 05/26/22 1541    hydroCHLOROthiazide (HYDRODIURIL) tablet 25 mg, 25 mg, Oral, QAM, Allie Knott APRN - NP, 25 mg at 05/26/22 0901    valsartan (DIOVAN) tablet 320 mg, 320 mg, Oral, Daily, Allie Knott APRN - NP, 320 mg at 05/26/22 0901    acetaminophen (TYLENOL) tablet 650 mg, 650 mg, Oral, K6B PRN, Barbi Arriaza APRN - CNP    polyethylene glycol (GLYCOLAX) packet 17 g, 17 g, Oral, Daily PRN, Barbi Arriaza APRN - CNP    traZODone (DESYREL) tablet 50 mg, 50 mg, Oral, Nightly PRN, ERNESTINE Castillo CNP, 50 mg at 05/24/22 2201    aluminum & magnesium hydroxide-simethicone (MAALOX) 200-200-20 MG/5ML suspension 30 mL, 30 mL, Oral, F0G PRN, ERNESTINE Craig - CNP    nicotine (NICODERM CQ) 21 MG/24HR 1 patch, 1 patch, TransDERmal, Daily, Breanna B BENJY ArriazaN - CNP    chlordiazePOXIDE (LIBRIUM) capsule 25 mg, 25 mg, Oral, T5R PRN, ERNESTINE Craig - CNP, 25 mg at 05/24/22 2201    hydrOXYzine (VISTARIL) capsule 50 mg, 50 mg, Oral, Q6H PRN, 50 mg at 05/26/22 1558 **OR** hydrOXYzine (VISTARIL) injection 50 mg, 50 mg, IntraMUSCular, U3C PRN, Barbi Arriaza APRN - CNP    haloperidol lactate (HALDOL) injection 5 mg, 5 mg, IntraMUSCular, Q6H PRN **OR** haloperidol (HALDOL) tablet 5 mg, 5 mg, Oral, W8B PRN, Barbi Arriaza, APRN - TAMMY      Examination:  /82   Pulse 80   Temp 97.8 °F (36.6 °C) (Oral)   Resp 18   Ht 6' 4\" (1.93 m)   Wt 260 lb (117.9 kg)   SpO2 98%   BMI 31.65 kg/m²   Gait - steady  Medication side effects(SE): no    Mental Status Examination:    Level of consciousness:  within normal limits   Appearance:  fair grooming and fair hygiene  Behavior/Motor: psychomotor retardation  Attitude toward examiner:  attentive  Speech:  slow   Mood: depressed  Affect:  blunted  Thought processes:  coherent   Thought content:  Suicidal Ideation:  passive  Cognition:  oriented to person, place, and time   Concentration poor  Insight poor   Judgement poor     ASSESSMENT:   Patient symptoms are:  [] Well controlled  [] Improving  [] Worsening  [x] No change      Diagnosis:   Principal Problem:    Bipolar disord, crnt episode mixed, severe, w psych features [facility])  Resolved Problems:    * No resolved hospital problems. *      LABS:    Recent Labs     05/24/22  1414   WBC 7.2   HGB 16.9        Recent Labs     05/24/22  1414      K 4.3      CO2 23   BUN 8   CREATININE 0.82   GLUCOSE 92     Recent Labs     05/24/22  1414   BILITOT 0.3   ALKPHOS 76   AST 17   ALT 30     Lab Results   Component Value Date    LABAMPH Neg 05/24/2022    BARBSCNU POSITIVE 05/24/2022    LABBENZ POSITIVE 05/24/2022    LABMETH Neg 05/24/2022    OPIATESCREENURINE Neg 05/24/2022    PHENCYCLIDINESCREENURINE Neg 05/24/2022    ETOH <10 05/24/2022     Lab Results   Component Value Date    TSH 1.540 05/24/2022     No results found for: LITHIUM  No results found for: VALPROATE, CBMZ    RISK ASSESSMENT: high suicidal risk    Treatment Plan:  Reviewed current Medications with the patient. Medication as ordered  Risks, benefits, side effects, drug-to-drug interactions and alternatives to treatment were discussed. Collateral information:   CD evaluation  Encourage patient to attend group and other milieu activities.   Discharge planning discussed with the patient and treatment team.    PSYCHOTHERAPY/COUNSELING:  [x] Therapeutic interview  [x] Supportive  [] CBT  [] Ongoing  [] Other    [x] Patient continues to need, on a daily basis, active treatment furnished directly by or requiring the supervision of inpatient psychiatric personnel      Anticipated Length of stay:            Electronically signed by Isaiah Tolbert MD on 5/26/2022 at 7:07 PM

## 2022-05-26 NOTE — PROGRESS NOTES
Patient did not attend group despite staff encouragement.   Electronically signed by Ute Geiger on 5/26/2022 at 3:03 PM

## 2022-05-26 NOTE — PROGRESS NOTES
Pt. refused to attend the 1000 skills group, despite staff encouragement.  Electronically signed by Billie Burns on 5/26/2022 at 12:36 PM

## 2022-05-26 NOTE — PROGRESS NOTES
Pt assessment completed in pt room, pt reports he is feeling very tired r/t the new medication, pt is calm and cooperative with care. Pt noted to have a flat sad affect. Reports racing thoughts are improving, pt is hopeful he can go to [facility] then a half way [place] r/t he needs a place to stay and wants to get treatment. Pt denies SI,HI,AVH, anxiety 4/10 depression 5/10 with 10 being the worst. Pt has good eye contact.

## 2022-05-27 PROCEDURE — 6370000000 HC RX 637 (ALT 250 FOR IP): Performed by: PSYCHIATRY & NEUROLOGY

## 2022-05-27 PROCEDURE — 6370000000 HC RX 637 (ALT 250 FOR IP): Performed by: NURSE PRACTITIONER

## 2022-05-27 PROCEDURE — 87591 N.GONORRHOEAE DNA AMP PROB: CPT

## 2022-05-27 PROCEDURE — 99232 SBSQ HOSP IP/OBS MODERATE 35: CPT | Performed by: PSYCHIATRY & NEUROLOGY

## 2022-05-27 PROCEDURE — 87491 CHLMYD TRACH DNA AMP PROBE: CPT

## 2022-05-27 PROCEDURE — 90833 PSYTX W PT W E/M 30 MIN: CPT | Performed by: PSYCHIATRY & NEUROLOGY

## 2022-05-27 PROCEDURE — 1240000000 HC EMOTIONAL WELLNESS R&B

## 2022-05-27 RX ADMIN — HYDROCHLOROTHIAZIDE 25 MG: 12.5 TABLET ORAL at 08:36

## 2022-05-27 RX ADMIN — DIVALPROEX SODIUM 250 MG: 250 TABLET, DELAYED RELEASE ORAL at 21:31

## 2022-05-27 RX ADMIN — VALSARTAN 320 MG: 160 TABLET, FILM COATED ORAL at 08:36

## 2022-05-27 RX ADMIN — HYDROXYZINE PAMOATE 50 MG: 50 CAPSULE ORAL at 11:30

## 2022-05-27 RX ADMIN — HYDROXYZINE PAMOATE 50 MG: 50 CAPSULE ORAL at 18:06

## 2022-05-27 RX ADMIN — DIVALPROEX SODIUM 250 MG: 250 TABLET, DELAYED RELEASE ORAL at 13:44

## 2022-05-27 RX ADMIN — ATORVASTATIN CALCIUM 20 MG: 20 TABLET, FILM COATED ORAL at 08:36

## 2022-05-27 RX ADMIN — DIVALPROEX SODIUM 250 MG: 250 TABLET, DELAYED RELEASE ORAL at 06:07

## 2022-05-27 RX ADMIN — AMLODIPINE BESYLATE 10 MG: 10 TABLET ORAL at 08:36

## 2022-05-27 RX ADMIN — QUETIAPINE FUMARATE 100 MG: 50 TABLET, EXTENDED RELEASE ORAL at 20:48

## 2022-05-27 RX ADMIN — TRAZODONE HYDROCHLORIDE 50 MG: 50 TABLET ORAL at 21:18

## 2022-05-27 NOTE — PROGRESS NOTES
Patient did not attend group despite staff encouragement.   Electronically signed by Casey Bryant on 5/26/2022 at 10:01 PM

## 2022-05-27 NOTE — FLOWSHEET NOTE
Pt was laying in his bed awake and alert with a flat and worried affect. Pt endorses fair sleep and improved appetite. Pt is not sure where he is going to go on discharge but states \" I have been in a long relationship and I just can't keep going back and forth\". Pt denies SI,HI,AVH.

## 2022-05-27 NOTE — PROGRESS NOTES
Pt. declined to attend the 0900 community meeting, despite staff encouragement.  Goal - \"Get used to my meds and stay awake\" Electronically signed by Emmett Lombard, RAC on 5/27/2022 at 9:56 AM

## 2022-05-27 NOTE — PROGRESS NOTES
Pt isolates to his room, pt reports feeling down depressed sad, \" I am 42years old and I can't live like this any more\", pt report she has not completed a program, he has left everyone he has tried, pt is upset about his situation and wants to do better. Pt denies SI,HI,AVH, pt appears sad and flat.

## 2022-05-27 NOTE — PROGRESS NOTES
[facility] 88. FOLLOW-UP NOTE       5/27/2022     Patient was seen and examined in person, Chart reviewed   Patient's case discussed with staff/team    Chief Complaint: Depression SI    Interim History:     Pt report feeling depressed and anxious especially in social settings  Patient is unable to go to groups  Patient report feeling hopeless and worthless with racing thoughts  Want to go to sober living or [facility] on discharge  Agreeable to meet with [facility]  Appetite:   [] Normal/Unchanged  [] Increased  [x] Decreased      Sleep:       [] Normal/Unchanged  [x] Fair       [] Poor              Energy:    [] Normal/Unchanged  [] Increased  [x] Decreased        SI [x] Present  [] Absent    HI  []Present  [x] Absent     Aggression:  [] yes  [x] no    Patient is [] able  [x] unable to CONTRACT FOR SAFETY     PAST MEDICAL/PSYCHIATRIC HISTORY:   Past Medical History:   Diagnosis Date    Depression     Hypertension 2015    trx with pills x 1 month / patient did not follow up    Smoker        FAMILY/SOCIAL HISTORY:  Family History   Problem Relation Age of Onset    High Blood Pressure Mother     Heart Disease Mother     Other Mother         copd / smoker    Stroke Father     High Blood Pressure Brother     Other Brother         anxiety     Social History     Socioeconomic History    Marital status: Single     Spouse name: Not on file    Number of children: Not on file    Years of education: Not on file    Highest education level: Not on file   Occupational History    Not on file   Tobacco Use    Smoking status: Current Every Day Smoker     Packs/day: 1.00     Years: 20.00     Pack years: 20.00     Types: Cigarettes    Smokeless tobacco: Never Used   Substance and Sexual Activity    Alcohol use: Not Currently     Alcohol/week: 6.0 standard drinks     Types: 6 Cans of beer per week     Comment: 6 pkg per day    Drug use: Yes     Types: Marijuana Wendyben West)     Comment: 2-3 x per week and benzos     Sexual activity: Not on file   Other Topics Concern    Not on file   Social History Narrative    Not on file     Social Determinants of Health     Financial Resource Strain:     Difficulty of Paying Living Expenses: Not on file   Food Insecurity:     Worried About Running Out of Food in the Last Year: Not on file    Kehinde of Food in the Last Year: Not on file   Transportation Needs:     Lack of Transportation (Medical): Not on file    Lack of Transportation (Non-Medical): Not on file   Physical Activity:     Days of Exercise per Week: Not on file    Minutes of Exercise per Session: Not on file   Stress:     Feeling of Stress : Not on file   Social Connections:     Frequency of Communication with Friends and Family: Not on file    Frequency of Social Gatherings with Friends and Family: Not on file    Attends Judaism Services: Not on file    Active Member of [address] [place] [address] [employer] Prell or Organizations: Not on file    Attends Club or Organization Meetings: Not on file    Marital Status: Not on file   Intimate Partner Violence:     Fear of Current or Ex-Partner: Not on file    Emotionally Abused: Not on file    Physically Abused: Not on file    Sexually Abused: Not on file   Housing Stability:     Unable to Pay for Housing in the Last Year: Not on file    Number of [place] in the Last Year: Not on file    Unstable Housing in the Last Year: Not on file           ROS:  [x] All negative/unchanged except if checked.  Explain positive(checked items) below:  [] Constitutional  [] Eyes  [] Ear/Nose/Mouth/Throat  [] Respiratory  [] CV  [] GI  []   [] Musculoskeletal  [] Skin/Breast  [] Neurological  [] Endocrine  [] Heme/Lymph  [] Allergic/Immunologic    Explanation:     MEDICATIONS:    Current Facility-Administered Medications:     divalproex (DEPAKOTE) DR tablet 250 mg, 250 mg, Oral, 3 times per day, Ej Méndez MD, 250 mg at 05/27/22 1344    QUEtiapine (SEROQUEL XR) extended release tablet 100 mg, 100 mg, Oral, Nightly, Paula Casas MD, 100 mg at 05/26/22 2101    amLODIPine (NORVASC) tablet 10 mg, 10 mg, Oral, Daily, Lynette Hatchet, APRN - NP, 10 mg at 05/27/22 0836    atorvastatin (LIPITOR) tablet 20 mg, 20 mg, Oral, Daily, Lynette Hatchet, APRN - NP, 20 mg at 05/27/22 0836    hydroCHLOROthiazide (HYDRODIURIL) tablet 25 mg, 25 mg, Oral, QAM, Lynette Hatchet, APRN - NP, 25 mg at 05/27/22 0836    valsartan (DIOVAN) tablet 320 mg, 320 mg, Oral, Daily, Lynette Hatchet, APRN - NP, 320 mg at 05/27/22 5317    acetaminophen (TYLENOL) tablet 650 mg, 650 mg, Oral, E3R PRN, Bart Carbine Dellick, APRN - CNP    polyethylene glycol (GLYCOLAX) packet 17 g, 17 g, Oral, Daily PRN, Bart Carbine Dellicjuliann, APRN - CNP    traZODone (DESYREL) tablet 50 mg, 50 mg, Oral, Nightly PRN, Natividad Abbott APRN - CNP, 50 mg at 05/24/22 2201    aluminum & magnesium hydroxide-simethicone (MAALOX) 200-200-20 MG/5ML suspension 30 mL, 30 mL, Oral, K5P PRN, Bart Mejialicjuliann, APRN - CNP    nicotine (NICODERM CQ) 21 MG/24HR 1 patch, 1 patch, TransDERmal, Daily, Breanna ASHU Arriaza APRN - CNP    chlordiazePOXIDE (LIBRIUM) capsule 25 mg, 25 mg, Oral, M8M PRN, Bart Carbine Dellouis, APRN - CNP, 25 mg at 05/24/22 2201    hydrOXYzine (VISTARIL) capsule 50 mg, 50 mg, Oral, Q6H PRN, 50 mg at 05/27/22 1130 **OR** hydrOXYzine (VISTARIL) injection 50 mg, 50 mg, IntraMUSCular, K5A PRN, Earnie Carbine Dellick, APRN - CNP    haloperidol lactate (HALDOL) injection 5 mg, 5 mg, IntraMUSCular, Q6H PRN **OR** haloperidol (HALDOL) tablet 5 mg, 5 mg, Oral, X8J PRN, Bart Arriaza, APRN - CNP      Examination:  /77   Pulse 67   Temp 98 °F (36.7 °C) (Oral)   Resp 18   Ht 6' 4\" (1.93 m)   Wt 260 lb (117.9 kg)   SpO2 97%   BMI 31.65 kg/m²   Gait - steady  Medication side effects(SE): no    Mental Status Examination:    Level of consciousness:  within normal limits   Appearance:  fair grooming and fair hygiene  Behavior/Motor:  psychomotor retardation  Attitude toward examiner:  attentive  Speech:  slow   Mood: depressed  Affect:  blunted  Thought processes:  coherent   Thought content:  Suicidal Ideation:  passive  Cognition:  oriented to person, place, and time   Concentration poor  Insight poor   Judgement poor     ASSESSMENT:   Patient symptoms are:  [] Well controlled  [] Improving  [] Worsening  [x] No change      Diagnosis:   Principal Problem:    Bipolar disord, crnt episode mixed, severe, w psych features [facility])  Resolved Problems:    * No resolved hospital problems. *      LABS:    No results for input(s): WBC, HGB, PLT in the last 72 hours. No results for input(s): NA, K, CL, CO2, BUN, CREATININE, GLUCOSE in the last 72 hours. No results for input(s): BILITOT, ALKPHOS, AST, ALT in the last 72 hours. Lab Results   Component Value Date    LABAMPH Neg 05/24/2022    BARBSCNU POSITIVE 05/24/2022    LABBENZ POSITIVE 05/24/2022    LABMETH Neg 05/24/2022    OPIATESCREENURINE Neg 05/24/2022    PHENCYCLIDINESCREENURINE Neg 05/24/2022    ETOH <10 05/24/2022     Lab Results   Component Value Date    TSH 1.540 05/24/2022     No results found for: LITHIUM  No results found for: VALPROATE, CBMZ    RISK ASSESSMENT: high suicidal risk    Treatment Plan:  Reviewed current Medications with the patient. Medication as ordered  Risks, benefits, side effects, drug-to-drug interactions and alternatives to treatment were discussed. Collateral information:   CD evaluation  Encourage patient to attend group and other milieu activities.   Discharge planning discussed with the patient and treatment team.    PSYCHOTHERAPY/COUNSELING:  [x] Therapeutic interview  [x] Supportive  [] CBT  [] Ongoing  [] Other  Patient was seen 1:1 for 20 minutes, other than E&M time spent, focusing on      - coping skills techniques     - Anxiety management techniques discussed including deep breathing exercise and PMR     - discussing patients strength and weakness      - Motivational interviewing to assess the stage of change and assessing patient readiness to quit substance use.      - Focusing on negative cognition and maladaptive thoughts, which is feeding and maintaining the depression symptoms         [x] Patient continues to need, on a daily basis, active treatment furnished directly by or requiring the supervision of inpatient psychiatric personnel      Anticipated Length of stay:            Electronically signed by Claire Jimenez MD on 5/27/2022 at 3:58 PM

## 2022-05-27 NOTE — CARE COORDINATION
Left voicemail with referral for [facility] to explore inpatient aod tx.  Electronically signed by DREW Huddleston on 5/27/2022 at 12:05 PM

## 2022-05-27 NOTE — PROGRESS NOTES
Pt. refused to attend the 1000 skills group, despite staff encouragement. Electronically signed by Ney Barriga, [address] on 5/27/2022 at 12:15 PM

## 2022-05-27 NOTE — GROUP NOTE
Group Therapy Note    Date: 5/27/2022    Group Start Time: 1400  Group End Time: 8370  Group Topic: Nursing    ML 3W COLE Lua RN        Group Therapy Note    Attendees: 8/21         Patient's Goal:                             learning self esteem building exercises   Notes:      Status After Intervention:  Improved    Participation Level:  Active Listener and Interactive    Participation Quality: Appropriate and Attentive      Speech:  normal      Thought Process/Content: Logical      Affective Functioning: Congruent      Mood: euthymic      Level of consciousness:  Oriented x4      Response to Learning: Able to verbalize current knowledge/experience      Endings: None Reported    Modes of Intervention: Education, Support and Exploration      Discipline Responsible: Registered Nurse      Signature:  Lien Lua RN

## 2022-05-27 NOTE — PROGRESS NOTES
Patient did not attend group despite staff encouragement.   Electronically signed by Hi Amaral on 5/26/2022 at 9:48 PM

## 2022-05-27 NOTE — PROGRESS NOTES
Patient asks for  And is given vistaril for 6-7/10 anxiety.  Electronically signed by Xavi Norton LPN on 4/43/6831 at 18:58 AM

## 2022-05-28 LAB
C. TRACHOMATIS DNA ,URINE: NEGATIVE
N. GONORRHOEAE DNA, URINE: NEGATIVE

## 2022-05-28 PROCEDURE — 1240000000 HC EMOTIONAL WELLNESS R&B

## 2022-05-28 PROCEDURE — 6370000000 HC RX 637 (ALT 250 FOR IP): Performed by: NURSE PRACTITIONER

## 2022-05-28 PROCEDURE — 6370000000 HC RX 637 (ALT 250 FOR IP): Performed by: PSYCHIATRY & NEUROLOGY

## 2022-05-28 RX ORDER — QUETIAPINE 200 MG/1
200 TABLET, FILM COATED, EXTENDED RELEASE ORAL NIGHTLY
Status: DISCONTINUED | OUTPATIENT
Start: 2022-05-28 | End: 2022-05-29

## 2022-05-28 RX ADMIN — DIVALPROEX SODIUM 250 MG: 250 TABLET, DELAYED RELEASE ORAL at 06:02

## 2022-05-28 RX ADMIN — VALSARTAN 320 MG: 160 TABLET, FILM COATED ORAL at 08:48

## 2022-05-28 RX ADMIN — HYDROXYZINE PAMOATE 50 MG: 50 CAPSULE ORAL at 18:21

## 2022-05-28 RX ADMIN — DIVALPROEX SODIUM 250 MG: 250 TABLET, DELAYED RELEASE ORAL at 21:43

## 2022-05-28 RX ADMIN — HYDROCHLOROTHIAZIDE 25 MG: 12.5 TABLET ORAL at 08:48

## 2022-05-28 RX ADMIN — HYDROXYZINE PAMOATE 50 MG: 50 CAPSULE ORAL at 11:13

## 2022-05-28 RX ADMIN — DIVALPROEX SODIUM 250 MG: 250 TABLET, DELAYED RELEASE ORAL at 13:30

## 2022-05-28 RX ADMIN — QUETIAPINE FUMARATE 200 MG: 200 TABLET, EXTENDED RELEASE ORAL at 21:43

## 2022-05-28 RX ADMIN — ATORVASTATIN CALCIUM 20 MG: 20 TABLET, FILM COATED ORAL at 08:48

## 2022-05-28 RX ADMIN — AMLODIPINE BESYLATE 10 MG: 10 TABLET ORAL at 08:48

## 2022-05-28 RX ADMIN — TRAZODONE HYDROCHLORIDE 50 MG: 50 TABLET ORAL at 22:41

## 2022-05-28 NOTE — PROGRESS NOTES
[address] NOTE       5/28/2022     Patient was seen and examined in person, Chart reviewed   Patient's case discussed with staff/team    Chief Complaint: \"I am feeling kind of paranoid. \"  Interim History:   Patient seen in his room this morning. He states that he is feeling kind of paranoid. He does deny SI/HI intent or plan and states he is eating and sleeping okay. He does appear to be guarded.   He offers little conversation        Appetite:   [] Normal/Unchanged  [] Increased  [x] Decreased      Sleep:       [] Normal/Unchanged  [x] Fair       [] Poor              Energy:    [] Normal/Unchanged  [] Increased  [x] Decreased        SI [x] Present  [] Absent    HI  []Present  [x] Absent     Aggression:  [] yes  [x] no    Patient is [] able  [x] unable to CONTRACT FOR SAFETY     PAST MEDICAL/PSYCHIATRIC HISTORY:   Past Medical History:   Diagnosis Date    Depression     Hypertension 2015    trx with pills x 1 month / patient did not follow up    Smoker        FAMILY/SOCIAL HISTORY:  Family History   Problem Relation Age of Onset    High Blood Pressure Mother     Heart Disease Mother     Other Mother         copd / smoker    Stroke Father     High Blood Pressure Brother     Other Brother         anxiety     Social History     Socioeconomic History    Marital status: Single     Spouse name: Not on file    Number of children: Not on file    Years of education: Not on file    Highest education level: Not on file   Occupational History    Not on file   Tobacco Use    Smoking status: Current Every Day Smoker     Packs/day: 1.00     Years: 20.00     Pack years: 20.00     Types: Cigarettes    Smokeless tobacco: Never Used   Substance and Sexual Activity    Alcohol use: Not Currently     Alcohol/week: 6.0 standard drinks     Types: 6 Cans of beer per week     Comment: 6 pkg per day    Drug use: Yes     Types: Marijuana (Weed)     Comment: 2-3 x per week and benzos     Sexual activity: Not on file   Other Topics Concern    Not on file   Social History Narrative    Not on file     Social Determinants of Health     Financial Resource Strain:     Difficulty of Paying Living Expenses: Not on file   Food Insecurity:     Worried About Running Out of Food in the Last Year: Not on file    Kehinde of Food in the Last Year: Not on file   Transportation Needs:     Lack of Transportation (Medical): Not on file    Lack of Transportation (Non-Medical): Not on file   Physical Activity:     Days of Exercise per Week: Not on file    Minutes of Exercise per Session: Not on file   Stress:     Feeling of Stress : Not on file   Social Connections:     Frequency of Communication with Friends and Family: Not on file    Frequency of Social Gatherings with Friends and Family: Not on file    Attends Catholic Services: Not on file    Active Member of [address] [place] [address] [employer] or Organizations: Not on file    Attends Club or Organization Meetings: Not on file    Marital Status: Not on file   Intimate Partner Violence:     Fear of Current or Ex-Partner: Not on file    Emotionally Abused: Not on file    Physically Abused: Not on file    Sexually Abused: Not on file   Housing Stability:     Unable to Pay for Housing in the Last Year: Not on file    Number of [place] in the Last Year: Not on file    Unstable Housing in the Last Year: Not on file           ROS:  [x] All negative/unchanged except if checked.  Explain positive(checked items) below:  [] Constitutional  [] Eyes  [] Ear/Nose/Mouth/Throat  [] Respiratory  [] CV  [] GI  []   [] Musculoskeletal  [] Skin/Breast  [] Neurological  [] Endocrine  [] Heme/Lymph  [] Allergic/Immunologic    Explanation:     MEDICATIONS:    Current Facility-Administered Medications:     divalproex (DEPAKOTE) DR tablet 250 mg, 250 mg, Oral, 3 times per day, Sheila Mercedes MD, 250 mg at 05/28/22 0602    QUEtiapine (SEROQUEL XR) extended release tablet 100 mg, 100 mg, Oral, Nightly, Cely Gordon MD, 100 mg at 05/27/22 2048    amLODIPine (NORVASC) tablet 10 mg, 10 mg, Oral, Daily, Lilia Ector, APRN - NP, 10 mg at 05/28/22 0848    atorvastatin (LIPITOR) tablet 20 mg, 20 mg, Oral, Daily, Lilia Ector, APRN - NP, 20 mg at 05/28/22 0848    hydroCHLOROthiazide (HYDRODIURIL) tablet 25 mg, 25 mg, Oral, QAM, Lilia Ector, APRN - NP, 25 mg at 05/28/22 0848    valsartan (DIOVAN) tablet 320 mg, 320 mg, Oral, Daily, Lilia Ector, APRN - NP, 320 mg at 05/28/22 0848    acetaminophen (TYLENOL) tablet 650 mg, 650 mg, Oral, G1R PRN, ERNESTINE Scott CNP    polyethylene glycol (GLYCOLAX) packet 17 g, 17 g, Oral, Daily PRN, ERNESTINE Scott CNP    traZODone (DESYREL) tablet 50 mg, 50 mg, Oral, Nightly PRN, ERNESTINE Orosco CNP, 50 mg at 05/27/22 2118    aluminum & magnesium hydroxide-simethicone (MAALOX) 200-200-20 MG/5ML suspension 30 mL, 30 mL, Oral, O5L PRN, ERNESTINE Scott CNP    nicotine (NICODERM CQ) 21 MG/24HR 1 patch, 1 patch, TransDERmal, Daily, BreannaERNESTINE Pretty CNP    chlordiazePOXIDE (LIBRIUM) capsule 25 mg, 25 mg, Oral, X2Q PRN, ERNESTINE Scott CNP, 25 mg at 05/24/22 2201    hydrOXYzine (VISTARIL) capsule 50 mg, 50 mg, Oral, Q6H PRN, 50 mg at 05/27/22 1806 **OR** hydrOXYzine (VISTARIL) injection 50 mg, 50 mg, IntraMUSCular, F8N PRN, ERNESTINE Scott CNP    haloperidol lactate (HALDOL) injection 5 mg, 5 mg, IntraMUSCular, Q6H PRN **OR** haloperidol (HALDOL) tablet 5 mg, 5 mg, Oral, J4B PRN, Winnie Arriaza, APRN - CNP      Examination:  /86   Pulse 79   Temp 97.7 °F (36.5 °C) (Oral)   Resp 20   Ht 6' 4\" (1.93 m)   Wt 260 lb (117.9 kg)   SpO2 100%   BMI 31.65 kg/m²   Gait - steady  Medication side effects(SE): no    Mental Status Examination:    Level of consciousness:  within normal limits   Appearance:  fair grooming and fair hygiene  Behavior/Motor:  psychomotor retardation  Attitude toward examiner:  attentive  Speech:  slow   Mood: depressed  Affect:  blunted  Thought processes:  coherent   Thought content: Appears guarded denies SI/HI intent or plan denies auditory or visual hallucinations   cognition:  oriented to person, place, and time   Concentration poor  Insight poor   Judgement poor     ASSESSMENT:   Patient symptoms are:  [] Well controlled  [] Improving  [] Worsening  [x] No change      Diagnosis:   Principal Problem:    Bipolar disord, crnt episode mixed, severe, w psych features [facility])  Resolved Problems:    * No resolved hospital problems. *      LABS:    No results for input(s): WBC, HGB, PLT in the last 72 hours. No results for input(s): NA, K, CL, CO2, BUN, CREATININE, GLUCOSE in the last 72 hours. No results for input(s): BILITOT, ALKPHOS, AST, ALT in the last 72 hours. Lab Results   Component Value Date    LABAMPH Neg 05/24/2022    BARBSCNU POSITIVE 05/24/2022    LABBENZ POSITIVE 05/24/2022    LABMETH Neg 05/24/2022    OPIATESCREENURINE Neg 05/24/2022    PHENCYCLIDINESCREENURINE Neg 05/24/2022    ETOH <10 05/24/2022     Lab Results   Component Value Date    TSH 1.540 05/24/2022     No results found for: LITHIUM  No results found for: VALPROATE, CBMZ    RISK ASSESSMENT: high suicidal risk    Treatment Plan:  Reviewed current Medications with the patient. Medication as ordered  Risks, benefits, side effects, drug-to-drug interactions and alternatives to treatment were discussed. Collateral information:   CD evaluation  Encourage patient to attend group and other milieu activities.   Discharge planning discussed with the patient and treatment team.    Continue Depakote 250 mg 3 times daily  Increase Seroquel XR to 200 mg at bedtime for paranoia  PSYCHOTHERAPY/COUNSELING:  [x] Therapeutic interview  [x] Supportive  [] CBT  [] Ongoing  [] Other       [x] Patient continues to need, on a daily basis, active treatment furnished directly by or requiring the supervision of inpatient psychiatric personnel      Anticipated Length of stay:            Electronically signed by ERNESTINE Patel CNP on 5/25/2909 at 9:10 AM

## 2022-05-28 NOTE — PROGRESS NOTES
Affect is flat. Slow to respond  Denies current suicidal or homicidal ideation  Reports \"at lot of stuff on my mind\"  States that he was doing out patient  Treatment but because he was feeling better/ he just stopped going.   Met with [facility]

## 2022-05-28 NOTE — PROGRESS NOTES
Patient cooperative with assessment and 1:1 talk time. Denies SI, denies HI and denies A/V hallucinations. Patient states he feels his new medication has kept him \"more calm. \" States \"I guess I feel safe here, not as much shit going on in here. \" Continued complaints of racing thoughts and difficulty falling asleep. Patient discusses his recent drug use and states he is looking forward to speaking with [facility] to discuss possible re-admit to [facility] and or sober living.

## 2022-05-28 NOTE — GROUP NOTE
Group Therapy Note    Date: 5/28/2022    Group Start Time: 7010  Group End Time: 1640  Group Topic: Healthy Living/Wellness    [facility]    [employer]        Group Therapy Note    Attendees: 11/17         Patient's Goal:  To learn how coping skills can affect mood. Notes:  Patient participated in healthy living group.     Status After Intervention:  Unchanged    Participation Level: Interactive    Participation Quality: Appropriate and Attentive      Speech:  normal      Thought Process/Content: Logical      Affective Functioning: Flat      Mood: euthymic      Level of consciousness:  Alert and Attentive      Response to Learning: Progressing to goal      Endings: None Reported    Modes of Intervention: Education      Discipline Responsible: [address] [employer] [address] [employer]      Signature:  Pippa Mart

## 2022-05-28 NOTE — GROUP NOTE
Group Therapy Note    Date: 5/27/2022    Group Start Time: 1950  Group End Time: 2020  Group Topic: Recreational    [facility]    [employer]        Group Therapy Note    Attendees: 9         Patient's Goal:  To play headbands with peers    Notes:  Pt minimally participated in group activity     Status After Intervention:  Unchanged    Participation Level: Minimal    Participation Quality: Attentive      Speech:  normal      Thought Process/Content: Logical      Affective Functioning: Congruent      Mood: euthymic      Level of consciousness:  Alert      Response to Learning: Able to verbalize current knowledge/experience      Endings: None Reported    Modes of Intervention: Support, Socialization and Activity      Discipline Responsible: Behavorial Health Tech      Signature:  Helen Springer show

## 2022-05-28 NOTE — GROUP NOTE
Group Therapy Note    Date: 5/27/2022    Group Start Time: 2020  Group End Time: 2030  Group Topic: Wrap-Up    [facility]    Joanna Balderas        Group Therapy Note    Attendees: 9         Patient's Goal:  \"To attend groups and interact more\"    Notes:  Pt stated that he met both of hs goals for the day     Status After Intervention:  Improved    Participation Level:  Active Listener    Participation Quality: Appropriate and Attentive      Speech:  normal      Thought Process/Content: Logical      Affective Functioning: Congruent      Mood: euthymic      Level of consciousness:  Alert and Oriented x4      Response to Learning: Able to verbalize current knowledge/experience      Endings: None Reported    Modes of Intervention: Socialization      Discipline Responsible: Behavorial Health Tech      Signature:  Joanna Balderas

## 2022-05-28 NOTE — PROGRESS NOTES
Pt. declined to attend the 0900 community meeting, despite staff encouragement.  Goal - \"To attend the groups\" Electronically signed by Elida Henderson [address] on 5/28/2022 at 10:02 AM

## 2022-05-28 NOTE — GROUP NOTE
Group Therapy Note    Date: 5/28/2022    Group Start Time: 1430  Group End Time: 1500  Group Topic: Healthy Living/Wellness    [address] [place] [address] [address], RN        Group Therapy Note    Attendees: 12/17       Patient's Goal:  Able to identify a positive aspect of themself.     Notes:  Participated in group    Status After Intervention:  Improved    Participation Level: Interactive    Participation Quality: Appropriate      Speech:  normal      Thought Process/Content: Logical      Affective Functioning: Flat      Mood: depressed      Level of consciousness:  Alert      Response to Learning: Able to verbalize current knowledge/experience      Endings: None Reported    Modes of Intervention: Education      Discipline Responsible: Registered Nurse      Signature:  Beulah Peres RN

## 2022-05-28 NOTE — PROGRESS NOTES
Pt. refused to attend the 1000 skills group, despite staff encouragement. Electronically signed by Jordan Ghotra, [address] on 5/28/2022 at 11:35 AM

## 2022-05-29 PROCEDURE — 1240000000 HC EMOTIONAL WELLNESS R&B

## 2022-05-29 PROCEDURE — 6370000000 HC RX 637 (ALT 250 FOR IP): Performed by: NURSE PRACTITIONER

## 2022-05-29 PROCEDURE — 6370000000 HC RX 637 (ALT 250 FOR IP): Performed by: PSYCHIATRY & NEUROLOGY

## 2022-05-29 RX ORDER — QUETIAPINE 300 MG/1
300 TABLET, FILM COATED, EXTENDED RELEASE ORAL NIGHTLY
Status: DISCONTINUED | OUTPATIENT
Start: 2022-05-29 | End: 2022-05-31

## 2022-05-29 RX ADMIN — DIVALPROEX SODIUM 250 MG: 250 TABLET, DELAYED RELEASE ORAL at 06:54

## 2022-05-29 RX ADMIN — HYDROCHLOROTHIAZIDE 25 MG: 12.5 TABLET ORAL at 08:56

## 2022-05-29 RX ADMIN — VALSARTAN 320 MG: 160 TABLET, FILM COATED ORAL at 08:56

## 2022-05-29 RX ADMIN — DIVALPROEX SODIUM 250 MG: 250 TABLET, DELAYED RELEASE ORAL at 13:47

## 2022-05-29 RX ADMIN — QUETIAPINE FUMARATE 300 MG: 300 TABLET, EXTENDED RELEASE ORAL at 21:13

## 2022-05-29 RX ADMIN — DIVALPROEX SODIUM 250 MG: 250 TABLET, DELAYED RELEASE ORAL at 21:13

## 2022-05-29 RX ADMIN — HYDROXYZINE PAMOATE 50 MG: 50 CAPSULE ORAL at 11:27

## 2022-05-29 RX ADMIN — AMLODIPINE BESYLATE 10 MG: 10 TABLET ORAL at 08:56

## 2022-05-29 RX ADMIN — ATORVASTATIN CALCIUM 20 MG: 20 TABLET, FILM COATED ORAL at 08:56

## 2022-05-29 RX ADMIN — HYDROXYZINE PAMOATE 50 MG: 50 CAPSULE ORAL at 18:36

## 2022-05-29 NOTE — PROGRESS NOTES
Pt. declined to attend the 0900 community meeting, despite staff encouragement.  Goal - \"To go to the groups and talk to people\" Electronically signed by Rashaad Chung, [address] on 5/29/2022 at 11:41 AM

## 2022-05-29 NOTE — PROGRESS NOTES
Pt out in the day room for breakfast. On assessment pt cooperative. Reports improved sleep last night with the Trazodone and denies any issues with his appetite. Pt reports anxiety and depression both 7/10 with 10 being the worst. Pt denies any SI, HI, AVH. Pt reports he did meet with [facility]- reports they were wanting pt to go to treatment in [place]. Pt reports he wants to return to [facility]- states he was told that be could return in 7 days d/t insurance issues. Pt reports he left [facility] after 15 days, wants to be in the area for treatment.

## 2022-05-29 NOTE — GROUP NOTE
Group Therapy Note    Date: 5/29/2022    Group Start Time: 4884  Group End Time: 6865  Group Topic: Healthy Living/Wellness    [facility]    Baron Fearing        Group Therapy Note    Attendees: 11/17         Patient's Goal:  To learn about healthy coping skills. Notes:  Patient participated in [facility].      Status After Intervention:  Unchanged    Participation Level: Interactive    Participation Quality: Appropriate and Attentive      Speech:  normal      Thought Process/Content: Logical      Affective Functioning: Congruent      Mood: euthymic      Level of consciousness:  Alert and Attentive      Response to Learning: Progressing to goal      Endings: None Reported    Modes of Intervention: Education      Discipline Responsible: [address], [employer]      Signature:  Baron Larkin

## 2022-05-29 NOTE — PROGRESS NOTES
Pt reports #5/10 depression, anxiety level is #7/10. Denies SI/HI/AVH. Pt has concerns about where to go and what to do after dc from unit, tried encouraging pt to go to tx facility in [place], pt states it is too far away. Pt reports attending all groups. Pt reports showering daily, Appetite good.

## 2022-05-29 NOTE — GROUP NOTE
Group Therapy Note    Date: 5/29/2022    Group Start Time: 3386  Group End Time: 5971  Group Topic: Group Therapy    [facility]    GIANNA Maravilla        Group Therapy Note    Attendees: 10         Patient's Goal:  To participate in a goal oriented group. Notes:  Patient stated his goal was to stay treatment and discharge focused. Status After Intervention:  Improved    Participation Level: Active Listener    Participation Quality: Appropriate      Speech:  normal      Thought Process/Content: Logical      Affective Functioning: Congruent      Mood: depressed      Level of consciousness:  Alert      Response to Learning: Able to verbalize current knowledge/experience      Endings: None Reported    Modes of Intervention: Education      Discipline Responsible: /Counselor      Signature:   GIANNA Maravilla

## 2022-05-29 NOTE — PROGRESS NOTES
[address] [facility] NOTE       5/29/2022     Patient was seen and examined in person, Chart reviewed   Patient's case discussed with staff/team    Chief Complaint: \"I am feeling kind of paranoid. \"  Interim History:   Patient seen in his room this morning. He states that he is feeling the same like yesterday. He does deny SI/HI intent or plan and states he is eating and sleeping okay. He does appear to be guarded. He offers little conversation. Remains guarded. Does not understand if the medication is working or not. Insight and judgment poor. Impulse control poor. Cognitive function seem to be the baseline. Alert and oriented pleasant person.         Appetite:   [] Normal/Unchanged  [] Increased  [x] Decreased      Sleep:       [] Normal/Unchanged  [x] Fair       [] Poor              Energy:    [] Normal/Unchanged  [] Increased  [x] Decreased        SI [x] Present  [] Absent    HI  []Present  [x] Absent     Aggression:  [] yes  [x] no    Patient is [] able  [x] unable to CONTRACT FOR SAFETY     PAST MEDICAL/PSYCHIATRIC HISTORY:   Past Medical History:   Diagnosis Date    Depression     Hypertension 2015    trx with pills x 1 month / patient did not follow up    Smoker        FAMILY/SOCIAL HISTORY:  Family History   Problem Relation Age of Onset    High Blood Pressure Mother     Heart Disease Mother     Other Mother         copd / smoker    Stroke Father     High Blood Pressure Brother     Other Brother         anxiety     Social History     Socioeconomic History    Marital status: Single     Spouse name: Not on file    Number of children: Not on file    Years of education: Not on file    Highest education level: Not on file   Occupational History    Not on file   Tobacco Use    Smoking status: Current Every Day Smoker     Packs/day: 1.00     Years: 20.00     Pack years: 20.00     Types: Cigarettes    Smokeless tobacco: Never Used   Substance and Sexual Activity    Alcohol use: Not Currently     Alcohol/week: 6.0 standard drinks     Types: 6 Cans of beer per week     Comment: 6 pkg per day    Drug use: Yes     Types: Marijuana Wendy Dasen)     Comment: 2-3 x per week and benzos     Sexual activity: Not on file   Other Topics Concern    Not on file   Social History Narrative    Not on file     Social Determinants of Health     Financial Resource Strain:     Difficulty of Paying Living Expenses: Not on file   Food Insecurity:     Worried About Running Out of Food in the Last Year: Not on file    Kehinde of Food in the Last Year: Not on file   Transportation Needs:     Lack of Transportation (Medical): Not on file    Lack of Transportation (Non-Medical): Not on file   Physical Activity:     Days of Exercise per Week: Not on file    Minutes of Exercise per Session: Not on file   Stress:     Feeling of Stress : Not on file   Social Connections:     Frequency of Communication with Friends and Family: Not on file    Frequency of Social Gatherings with Friends and Family: Not on file    Attends Gnosticist Services: Not on file    Active Member of [address] [place] [address] or Organizations: Not on file    Attends Club or Organization Meetings: Not on file    Marital Status: Not on file   Intimate Partner Violence:     Fear of Current or Ex-Partner: Not on file    Emotionally Abused: Not on file    Physically Abused: Not on file    Sexually Abused: Not on file   Housing Stability:     Unable to Pay for Housing in the Last Year: Not on file    Number of [place] in the Last Year: Not on file    Unstable Housing in the Last Year: Not on file           ROS:  [x] All negative/unchanged except if checked.  Explain positive(checked items) below:  [] Constitutional  [] Eyes  [] Ear/Nose/Mouth/Throat  [] Respiratory  [] CV  [] GI  []   [] Musculoskeletal  [] Skin/Breast  [] Neurological  [] Endocrine  [] Heme/Lymph  [] Allergic/Immunologic    Explanation: MEDICATIONS:    Current Facility-Administered Medications:     QUEtiapine (SEROQUEL XR) extended release tablet 200 mg, 200 mg, Oral, Nightly, ERNESTINE Vann CNP, 316 mg at 05/28/22 2143    divalproex (DEPAKOTE) DR tablet 250 mg, 250 mg, Oral, 3 times per day, Poppy Tabor MD, 250 mg at 05/29/22 0654    amLODIPine (NORVASC) tablet 10 mg, 10 mg, Oral, Daily, ERNESTINE Kahn - NP, 10 mg at 05/29/22 0856    atorvastatin (LIPITOR) tablet 20 mg, 20 mg, Oral, Daily, ERNESTINE Kahn - NP, 20 mg at 05/29/22 0856    hydroCHLOROthiazide (HYDRODIURIL) tablet 25 mg, 25 mg, Oral, QAM, ERNESTINE Kahn - NP, 25 mg at 05/29/22 0856    valsartan (DIOVAN) tablet 320 mg, 320 mg, Oral, Daily, ERNESTINE Kahn - MIGUEL, 320 mg at 05/29/22 0856    acetaminophen (TYLENOL) tablet 650 mg, 650 mg, Oral, G4W PRN, ERNESTINE Vann CNP    polyethylene glycol (GLYCOLAX) packet 17 g, 17 g, Oral, Daily PRN, ERNESTINE Vann CNP    traZODone (DESYREL) tablet 50 mg, 50 mg, Oral, Nightly PRN, ERNESTINE Vann CNP, 50 mg at 05/28/22 2241    aluminum & magnesium hydroxide-simethicone (MAALOX) 200-200-20 MG/5ML suspension 30 mL, 30 mL, Oral, Z3U PRN, ERNESTINE Vann CNP    chlordiazePOXIDE (LIBRIUM) capsule 25 mg, 25 mg, Oral, R9W PRN, ERNESTINE Vann - CNP, 25 mg at 05/24/22 2201    hydrOXYzine (VISTARIL) capsule 50 mg, 50 mg, Oral, Q6H PRN, 50 mg at 05/28/22 1821 **OR** hydrOXYzine (VISTARIL) injection 50 mg, 50 mg, IntraMUSCular, L2G PRN, Jolene Arriaza, APRN - CNP    haloperidol lactate (HALDOL) injection 5 mg, 5 mg, IntraMUSCular, Q6H PRN **OR** haloperidol (HALDOL) tablet 5 mg, 5 mg, Oral, U3L PRN, Jolene Arriaza, APRN - CNP      Examination:  /83   Pulse 74   Temp 98.1 °F (36.7 °C) (Oral)   Resp 18   Ht 6' 4\" (1.93 m)   Wt 260 lb (117.9 kg)   SpO2 98%   BMI 31.65 kg/m²   Gait - steady  Medication side effects(SE): no    Mental Status Examination:    Level of consciousness:  within normal limits   Appearance:  fair grooming and fair hygiene  Behavior/Motor:  psychomotor retardation  Attitude toward examiner:  attentive  Speech:  slow   Mood: depressed  Affect:  blunted  Thought processes:  coherent   Thought content: Appears guarded denies SI/HI intent or plan denies auditory or visual hallucinations   cognition:  oriented to person, place, and time   Concentration poor  Insight poor   Judgement poor     ASSESSMENT:   Patient symptoms are:  [] Well controlled  [] Improving  [] Worsening  [x] No change      Diagnosis:   Principal Problem:    Bipolar disord, crnt episode mixed, severe, w psych features [facility])  Resolved Problems:    * No resolved hospital problems. *      LABS:    No results for input(s): WBC, HGB, PLT in the last 72 hours. No results for input(s): NA, K, CL, CO2, BUN, CREATININE, GLUCOSE in the last 72 hours. No results for input(s): BILITOT, ALKPHOS, AST, ALT in the last 72 hours. Lab Results   Component Value Date    LABAMPH Neg 05/24/2022    BARBSCNU POSITIVE 05/24/2022    LABBENZ POSITIVE 05/24/2022    LABMETH Neg 05/24/2022    OPIATESCREENURINE Neg 05/24/2022    PHENCYCLIDINESCREENURINE Neg 05/24/2022    ETOH <10 05/24/2022     Lab Results   Component Value Date    TSH 1.540 05/24/2022     No results found for: LITHIUM  No results found for: VALPROATE, CBMZ    RISK ASSESSMENT: high suicidal risk    Treatment Plan:  Reviewed current Medications with the patient. Medication as ordered  Risks, benefits, side effects, drug-to-drug interactions and alternatives to treatment were discussed. Collateral information:   CD evaluation  Encourage patient to attend group and other milieu activities.   Discharge planning discussed with the patient and treatment team.    Continue Depakote 250 mg 3 times daily  Increase Seroquel XR to 200 mg at bedtime for paranoia  Valproic acid level as ordered  Increase Seroquel XR to 300 mg at bedtime  for continued paranoia  PSYCHOTHERAPY/COUNSELING:  [x] Therapeutic interview  [x] Supportive  [] CBT  [] Ongoing  [] Other       [x] Patient continues to need, on a daily basis, active treatment furnished directly by or requiring the supervision of inpatient psychiatric personnel      Anticipated Length of stay:            Electronically signed by Irene Andersen MD on 5/29/2022 at 10:54 AM

## 2022-05-29 NOTE — PROGRESS NOTES
Pt. refused to attend the 1000 skills group, despite staff encouragement. Electronically signed by Sandra [address] on 5/29/2022 at 11:42 AM

## 2022-05-29 NOTE — GROUP NOTE
Group Therapy Note    Date: 5/29/2022    Group Start Time: 1600  Group End Time: 5148  Group Topic: Recreational    [facility]    Almaz Man        Group Therapy Note    Attendees: 15/17         Patient's Goal:  To play corn hole with the group. Notes:  Patient participated in activity group with peers. Status After Intervention:  Unchanged    Participation Level:  Active Listener    Participation Quality: Appropriate, Attentive and Supportive      Speech:  normal      Thought Process/Content: Logical      Affective Functioning: Flat      Mood: euthymic      Level of consciousness:  Alert and Attentive      Response to Learning: Progressing to goal      Endings: None Reported    Modes of Intervention: Activity      Discipline Responsible: [address], [employer]      Signature:  Almaz Man

## 2022-05-29 NOTE — PROGRESS NOTES
Patient did not attend group despite staff encouragement.   Electronically signed by Mili Welsh on 5/28/2022 at 9:49 PM

## 2022-05-29 NOTE — PROGRESS NOTES
Patient is using vistaril prn for anxiety. Patient does shower this day.  Electronically signed by Tere Maldonado LPN on 5/05/1825 at 9:50 PM

## 2022-05-29 NOTE — PROGRESS NOTES
Patient did not attend group despite staff encouragement.   Electronically signed by Armida Ruiz on 5/28/2022 at 9:57 PM

## 2022-05-29 NOTE — PROGRESS NOTES
Patient refuses nicotine patch each morning.  Electronically signed by Yecenia Russell LPN on 1/94/2881 at 38:88 AM

## 2022-05-30 PROCEDURE — 6370000000 HC RX 637 (ALT 250 FOR IP): Performed by: PSYCHIATRY & NEUROLOGY

## 2022-05-30 PROCEDURE — 6370000000 HC RX 637 (ALT 250 FOR IP): Performed by: NURSE PRACTITIONER

## 2022-05-30 PROCEDURE — 1240000000 HC EMOTIONAL WELLNESS R&B

## 2022-05-30 RX ADMIN — AMLODIPINE BESYLATE 10 MG: 10 TABLET ORAL at 09:25

## 2022-05-30 RX ADMIN — DIVALPROEX SODIUM 250 MG: 250 TABLET, DELAYED RELEASE ORAL at 09:40

## 2022-05-30 RX ADMIN — DIVALPROEX SODIUM 250 MG: 250 TABLET, DELAYED RELEASE ORAL at 16:35

## 2022-05-30 RX ADMIN — QUETIAPINE FUMARATE 300 MG: 300 TABLET, EXTENDED RELEASE ORAL at 20:50

## 2022-05-30 RX ADMIN — HYDROXYZINE PAMOATE 50 MG: 50 CAPSULE ORAL at 19:20

## 2022-05-30 RX ADMIN — HYDROCHLOROTHIAZIDE 25 MG: 12.5 TABLET ORAL at 09:26

## 2022-05-30 RX ADMIN — VALSARTAN 320 MG: 160 TABLET, FILM COATED ORAL at 09:25

## 2022-05-30 RX ADMIN — DIVALPROEX SODIUM 250 MG: 250 TABLET, DELAYED RELEASE ORAL at 20:50

## 2022-05-30 RX ADMIN — ATORVASTATIN CALCIUM 20 MG: 20 TABLET, FILM COATED ORAL at 09:25

## 2022-05-30 RX ADMIN — HYDROXYZINE PAMOATE 50 MG: 50 CAPSULE ORAL at 12:50

## 2022-05-30 NOTE — GROUP NOTE
Group Therapy Note    Date: 5/30/2022    Group Start Time: 1900  Group End Time: 1945  Group Topic: Recreational    [facility]    Victorino Kessler        Group Therapy Note    Attendees: 14/22         Patient's Goal:  To participate in activity group. Notes:  Patient participated in group with peers.      Status After Intervention:  Unchanged    Participation Level: Interactive    Participation Quality: Appropriate and Attentive      Speech:  normal      Thought Process/Content: Logical      Affective Functioning: Congruent      Mood: euthymic      Level of consciousness:  Alert and Attentive      Response to Learning: Progressing to goal      Endings: None Reported    Modes of Intervention: Activity      Discipline Responsible: [address], [facility] [email or web address]      Signature:  Victorino Kessler

## 2022-05-30 NOTE — PROGRESS NOTES
Pt requested a Vistaril for anxiety which he rated 8 out of 10, 10 being the highest.  Pt given Vistaril.

## 2022-05-30 NOTE — GROUP NOTE
Group Therapy Note    Date: 5/30/2022    Group Start Time: 1100  Group End Time: 1863  Group Topic: Psychotherapy    [address] MSW, LSW        Group Therapy Note    Attendees: attendees discussed goal for the day and how they were feeling in regards to symptom regulation and goals from previous day. Patient's Goal:  Get better on meds    Notes:  Was engaged in group    Status After Intervention:  Improved    Participation Level: Active Listener    Participation Quality: Appropriate      Speech:  normal      Thought Process/Content: Logical      Affective Functioning: Congruent      Mood: euphoric      Level of consciousness:  Oriented x4      Response to Learning: Able to verbalize current knowledge/experience      Endings: None Reported    Modes of Intervention: Support      Discipline Responsible: /Counselor      Signature:   Carlyon Mcardle, MSW, LSW

## 2022-05-30 NOTE — GROUP NOTE
Group Therapy Note    Date: 5/29/2022    Group Start Time: 2045  Group End Time: 2055  Group Topic: Wrap-Up    [facility]    Bernardo Hanley        Group Therapy Note    Attendees: 9/17         Patient's Goal:  \"not to isolate\"    Notes:  Patient reported meeting their goal for the day. Patient shared he enjoyed talking to peers today.     Status After Intervention:  Unchanged    Participation Level: Interactive    Participation Quality: Appropriate, Attentive and Sharing      Speech:  normal      Thought Process/Content: Logical      Affective Functioning: Congruent      Mood: euthymic      Level of consciousness:  Alert and Attentive      Response to Learning: Progressing to goal      Endings: None Reported    Modes of Intervention: Support      Discipline Responsible: [employer]      Signature:  Bernardo Hanley

## 2022-05-30 NOTE — PROGRESS NOTES
Morning Community Meeting Topics    Cathi Glass attended the morning community meeting on 5/30/22. Topics discussed today     [x] Introduction   Day of the week and date   Mask distribution   Current mask requirements  [x]Teams   Explanation of  Green and Blue team criteria   Nurses assigned to each team for today   Explanation about green and blue paper  o Date  o Patient's Name  o Patient's Nurse  o Goals  [x] Visitation   Announce the visiting hours for the day   Announce which team is allowed to have visitors for the day   Review any updated Covid 19 requirements for visitors during visitation  o Vaccine Card or negative Covid test within 48 hours of visit  o State Identification   Patients are reminded to alert the  at least 1 hour before visitation   [x] Unit Orientation   Coffee use   Phone location and etiquette   Shower locations  [employer] and dryer location and process   Common area expectations   Staff rounds expectation  [x] Meals    Educate patient to the menu  o The patient is encouraged to fill out the menu to get preferences at mealtime  o The patient is educated that if they do not fill out the menu, they will get the standard tray  o The coffee pot is decaf, patient encouraged to order regular coffee from menu.    Educate patient to the meal process   Patient encouraged to eat snacks provided twice daily  o Snacks may stay in patient room     [x] Discharge Process   Discharge expectations   Fill out the survey after discharge   [x] Hygiene   Daily showers encouraged  o Showers availability discussed    Daily dressing encouraged  o Discussed wearing street clothing   Education provided on where to place linens and clothing  o Linens in the hamper  o personal clothing does not go into the linen hamper  [x] Group    Patient encouraged to attend group provided   Time of Group Meetings discussed   Gentle reminder that attendance is a Physician order  [x] Movement   Chair exercises completed   Stretching completed  Notes: Goal - \"To attend the groups\" Electronically signed by AURELIA Tran on 5/30/2022 at 10:10 AM

## 2022-05-30 NOTE — PROGRESS NOTES
Pt out in the day room this AM, social with select peers. On assessment pt pleasant and cooperative. Pt reports poor sleep last night even with increased Seroquel at HS. Reports anxiety and depression both 8/10 with 10 being the worst. Pt denies any SI, HI, AVH. Reports feeling a little better with medications and seems goal oriented regarding coordinating his living situation, getting a job, etc. Pt talked about not returning to [address] [place] [address] or any rehab center and instead returning to his home. Pt verbalized wanting to do AA and about following up outpatient. Pt encouraged to shower and attend groups.

## 2022-05-30 NOTE — PROGRESS NOTES
Pt. refused to attend the 1000 skills group, despite staff encouragement. Electronically signed by Loy Tong, [address] on 5/30/2022 at 11:55 AM

## 2022-05-30 NOTE — PROGRESS NOTES
Pt reports depression level is #7-8/10, anxiety level is #7/10. Denies SI/HI/AVH. Pt reports attending all groups today. Pt reports good appetite, is visible on unit in day room. Pts' goal is to be dc'd & go home.

## 2022-05-30 NOTE — GROUP NOTE
Group Therapy Note    Date: 5/30/2022    Group Start Time: 7411  Group End Time: 2634  Group Topic: Healthy Living/Wellness    [facility]    Victorino Kessler        Group Therapy Note    Attendees: 16/21         Patient's Goal:  To practice using coping skills. Notes:  Patient participated in group with peers. Status After Intervention:  Unchanged    Participation Level:  Active Listener    Participation Quality: Appropriate, Attentive and Supportive      Speech:  normal      Thought Process/Content: Logical      Affective Functioning: Congruent      Mood: euthymic      Level of consciousness:  Alert and Attentive      Response to Learning: Progressing to goal      Endings: None Reported    Modes of Intervention: Education      Discipline Responsible: [address], [facility]      Signature:  Victorino Kessler

## 2022-05-30 NOTE — GROUP NOTE
Group Therapy Note    Date: 5/30/2022    Group Start Time: 1400  Group End Time: 1430  Group Topic: Healthy Living/Wellness    [facility]    Kandy Kimbrough RN        Group Therapy Note    Attendees: 7/21         Patient's Goal: Discuss/review importance of coping skills. Notes: Alert and appropriate.      Status After Intervention:  Unchanged    Participation Level: Interactive    Participation Quality: Appropriate and Attentive      Speech:  normal      Thought Process/Content: Logical  Linear      Affective Functioning: Flat      Mood: euthymic      Level of consciousness:  Alert and Oriented x4      Response to Learning: Able to verbalize current knowledge/experience and Able to verbalize/acknowledge new learning      Endings: None Reported    Modes of Intervention: Education and Socialization      Discipline Responsible: Registered Nurse      Signature:  Kandy Kimbrough RN

## 2022-05-31 LAB — VALPROIC ACID LEVEL: 44.5 UG/ML (ref 50–100)

## 2022-05-31 PROCEDURE — 6370000000 HC RX 637 (ALT 250 FOR IP): Performed by: NURSE PRACTITIONER

## 2022-05-31 PROCEDURE — 99232 SBSQ HOSP IP/OBS MODERATE 35: CPT | Performed by: PSYCHIATRY & NEUROLOGY

## 2022-05-31 PROCEDURE — 6370000000 HC RX 637 (ALT 250 FOR IP): Performed by: PSYCHIATRY & NEUROLOGY

## 2022-05-31 PROCEDURE — 80164 ASSAY DIPROPYLACETIC ACD TOT: CPT

## 2022-05-31 PROCEDURE — 1240000000 HC EMOTIONAL WELLNESS R&B

## 2022-05-31 PROCEDURE — 36415 COLL VENOUS BLD VENIPUNCTURE: CPT

## 2022-05-31 RX ORDER — QUETIAPINE FUMARATE 300 MG/1
300 TABLET, FILM COATED ORAL NIGHTLY
Status: DISCONTINUED | OUTPATIENT
Start: 2022-05-31 | End: 2022-06-01 | Stop reason: HOSPADM

## 2022-05-31 RX ADMIN — HYDROCHLOROTHIAZIDE 25 MG: 12.5 TABLET ORAL at 09:08

## 2022-05-31 RX ADMIN — HYDROXYZINE PAMOATE 50 MG: 50 CAPSULE ORAL at 18:46

## 2022-05-31 RX ADMIN — QUETIAPINE FUMARATE 300 MG: 300 TABLET ORAL at 21:38

## 2022-05-31 RX ADMIN — HYDROXYZINE PAMOATE 50 MG: 50 CAPSULE ORAL at 12:00

## 2022-05-31 RX ADMIN — DIVALPROEX SODIUM 250 MG: 250 TABLET, DELAYED RELEASE ORAL at 14:10

## 2022-05-31 RX ADMIN — AMLODIPINE BESYLATE 10 MG: 10 TABLET ORAL at 09:09

## 2022-05-31 RX ADMIN — DIVALPROEX SODIUM 250 MG: 250 TABLET, DELAYED RELEASE ORAL at 21:38

## 2022-05-31 RX ADMIN — ATORVASTATIN CALCIUM 20 MG: 20 TABLET, FILM COATED ORAL at 09:08

## 2022-05-31 RX ADMIN — VALSARTAN 320 MG: 160 TABLET, FILM COATED ORAL at 09:08

## 2022-05-31 RX ADMIN — DIVALPROEX SODIUM 250 MG: 250 TABLET, DELAYED RELEASE ORAL at 09:08

## 2022-05-31 NOTE — GROUP NOTE
Group Therapy Note    Date: 5/30/2022    Group Start Time: 2050  Group End Time: 2100  Group Topic: Wrap-Up    [facility]    Grey Eller        Group Therapy Note    Attendees: 13/22         Patient's Goal:  \"to to most of the groups\"    Notes:  Patient reported meeting their goal for the day. Patient shared he enjoyed talking to his friends today, as they called to check up on him.     Status After Intervention:  Unchanged    Participation Level: Interactive    Participation Quality: Appropriate, Attentive and Sharing      Speech:  normal      Thought Process/Content: Logical      Affective Functioning: Congruent      Mood: euthymic      Level of consciousness:  Alert and Attentive      Response to Learning: Progressing to goal      Endings: None Reported    Modes of Intervention: Support      Discipline Responsible: [employer]      Signature:  Kash Eller

## 2022-05-31 NOTE — PROGRESS NOTES
Pt. declined to attend the 0900 community meeting, despite staff encouragement.  Goal - \"Work on going home\" Electronically signed by Keven Cosby, [address] on 5/31/2022 at 10:04 AM

## 2022-05-31 NOTE — PROGRESS NOTES
Spiritual Support Group Note    Number of Participants in Group: 10                       Time: 15:00-15:50    Goal: Relief from isolation and loneliness             Catia Sharing             Self-understanding and gain insight              Acceptance and belonging            Recognize they are not alone                Socialization             Empowerment       Encouragement    Topic:  [] Spiritual Wellness and Self Care                  [x] Hope                     [] Connecting with Divine/Others        [] Thankfulness and Gratitude               []  Meaningfulness and Purpose               [] Forgiveness               [] Peace               [] Connect to [employer]      [] Other    Participation Level:   [x] Active Listener   [] Minimal   [] Monopolizing   [] Interactive   [] No Participation   []  Other:     Attention:   [x] Alert   [] Distractible   [] Drowsy   [] Poor   [] Other:    Manner:   [x] Cooperative   [] Suspicious   [] Withdrawn   [] Guarded   [] Irritable   [] Inhospitable   [] Other:     Others Comments from Group:

## 2022-05-31 NOTE — PROGRESS NOTES
Pt. refused to attend the 1000 skills group, despite staff encouragement. Electronically signed by Blanquita Bernal, [address] on 5/31/2022 at 12:01 PM

## 2022-05-31 NOTE — FLOWSHEET NOTE
Pt visible out on the unit with a flat affect. Pt c/o anxiety rated a 5/10 and requested a vistaril. Pt reports feeling anxious about leaving and states he has a place to go on discharge. 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 Pt noted to have a good appetite and sleeping well. Pt denies SI,HI,AVH.

## 2022-05-31 NOTE — PROGRESS NOTES
Patient did not attend group despite staff encouragement.   .Electronically signed by Valerie Agosto on 5/31/2022 at 1:59 PM

## 2022-05-31 NOTE — GROUP NOTE
Group Therapy Note    Date: 5/31/2022    Group Start Time: 1620  Group End Time: 1700  Group Topic: Healthy Living/Wellness    [facility]    Chrissy Mallory        Group Therapy Note    Attendees: 13         Patient's Goal:  To learn about nutrition by participating in an activity     Notes:  Pt minimally participated in group activity     Status After Intervention:  Unchanged    Participation Level: Minimal    Participation Quality: Attentive      Speech:  normal      Thought Process/Content: Logical      Affective Functioning: Congruent      Mood: euthymic      Level of consciousness:  Alert      Response to Learning: Able to verbalize current knowledge/experience      Endings: None Reported    Modes of Intervention: Education, Socialization and Activity      Discipline Responsible: [facility]      Signature:  Chrissy Mallory

## 2022-05-31 NOTE — CARE COORDINATION
Writer approached pt regarding [facility] seeing him. Reports there are not beds available at [facility]. Reports LGr suggested a placement in [place] but pt declines this. Pt wants to return home at discharge.  Electronically signed by DREW Betts on 5/31/2022 at 8:07 AM

## 2022-05-31 NOTE — PROGRESS NOTES
Aurelio [facility]. FOLLOW-UP NOTE       5/31/2022     Patient was seen and examined in person, Chart reviewed   Patient's case discussed with staff/team    Chief Complaint: Depression SI    Interim History:     Pt report feeling better with mood  Sleep still struggling  Less anxious  More hopeful about his future  Does not want to go to IP rehab  Prefer to go home and work with OP counseling    Appetite:   [] Normal/Unchanged  [] Increased  [x] Decreased      Sleep:       [] Normal/Unchanged  [] Fair       [x] Poor              Energy:    [] Normal/Unchanged  [] Increased  [x] Decreased        SI [] Present  [x] Absent    HI  []Present  [x] Absent     Aggression:  [] yes  [x] no    Patient is [] able  [x] unable to CONTRACT FOR SAFETY     PAST MEDICAL/PSYCHIATRIC HISTORY:   Past Medical History:   Diagnosis Date    Depression     Hypertension 2015    trx with pills x 1 month / patient did not follow up    Smoker        FAMILY/SOCIAL HISTORY:  Family History   Problem Relation Age of Onset    High Blood Pressure Mother     Heart Disease Mother     Other Mother         copd / smoker    Stroke Father     High Blood Pressure Brother     Other Brother         anxiety     Social History     Socioeconomic History    Marital status: Single     Spouse name: Not on file    Number of children: Not on file    Years of education: Not on file    Highest education level: Not on file   Occupational History    Not on file   Tobacco Use    Smoking status: Current Every Day Smoker     Packs/day: 1.00     Years: 20.00     Pack years: 20.00     Types: Cigarettes    Smokeless tobacco: Never Used   Substance and Sexual Activity    Alcohol use: Not Currently     Alcohol/week: 6.0 standard drinks     Types: 6 Cans of beer per week     Comment: 6 pkg per day    Drug use: Yes     Types: Marijuana (Weed)     Comment: 2-3 x per week and benzos     Sexual activity: Not on file   Other Topics Concern    Not on file   Social History Narrative    Not on file     Social Determinants of Health     Financial Resource Strain:     Difficulty of Paying Living Expenses: Not on file   Food Insecurity:     Worried About Running Out of Food in the Last Year: Not on file    Kehinde of Food in the Last Year: Not on file   Transportation Needs:     Lack of Transportation (Medical): Not on file    Lack of Transportation (Non-Medical): Not on file   Physical Activity:     Days of Exercise per Week: Not on file    Minutes of Exercise per Session: Not on file   Stress:     Feeling of Stress : Not on file   Social Connections:     Frequency of Communication with Friends and Family: Not on file    Frequency of Social Gatherings with Friends and Family: Not on file    Attends Episcopal Services: Not on file    Active Member of [address] [place] [address] [employer] or Organizations: Not on file    Attends Club or Organization Meetings: Not on file    Marital Status: Not on file   Intimate Partner Violence:     Fear of Current or Ex-Partner: Not on file    Emotionally Abused: Not on file    Physically Abused: Not on file    Sexually Abused: Not on file   Housing Stability:     Unable to Pay for Housing in the Last Year: Not on file    Number of [place] in the Last Year: Not on file    Unstable Housing in the Last Year: Not on file           ROS:  [x] All negative/unchanged except if checked.  Explain positive(checked items) below:  [] Constitutional  [] Eyes  [] Ear/Nose/Mouth/Throat  [] Respiratory  [] CV  [] GI  []   [] Musculoskeletal  [] Skin/Breast  [] Neurological  [] Endocrine  [] Heme/Lymph  [] Allergic/Immunologic    Explanation:     MEDICATIONS:    Current Facility-Administered Medications:     QUEtiapine (SEROQUEL) tablet 300 mg, 300 mg, Oral, Nightly, Mirtha Cannon MD    divalproex (DEPAKOTE) DR tablet 250 mg, 250 mg, Oral, 3 times per day, Mirtha Cannon MD, 250 mg at 05/31/22 0908    amLODIPine (NORVASC) tablet 10 mg, 10 mg, Oral, Daily, Rosary Gisell, APRN - NP, 10 mg at 05/31/22 0909    atorvastatin (LIPITOR) tablet 20 mg, 20 mg, Oral, Daily, Rosary Gisell, APRN - NP, 20 mg at 05/31/22 0908    hydroCHLOROthiazide (HYDRODIURIL) tablet 25 mg, 25 mg, Oral, QAM, Rosary Gisell, APRN - NP, 25 mg at 05/31/22 0908    valsartan (DIOVAN) tablet 320 mg, 320 mg, Oral, Daily, Rosary Gisell, APRN - NP, 320 mg at 05/31/22 0908    acetaminophen (TYLENOL) tablet 650 mg, 650 mg, Oral, D4G PRN, ERNESTINE Braga - CNP    polyethylene glycol (GLYCOLAX) packet 17 g, 17 g, Oral, Daily PRN, ERNESTINE Braga - CNP    traZODone (DESYREL) tablet 50 mg, 50 mg, Oral, Nightly PRN, ERNESTINE Braga - CNP, 50 mg at 05/28/22 2241    aluminum & magnesium hydroxide-simethicone (MAALOX) 200-200-20 MG/5ML suspension 30 mL, 30 mL, Oral, P0P PRN, ERNESTINE Braga - CNP    chlordiazePOXIDE (LIBRIUM) capsule 25 mg, 25 mg, Oral, D1U PRN, Darroll Pott, ERNESTINE - CNP, 25 mg at 05/24/22 2201    hydrOXYzine (VISTARIL) capsule 50 mg, 50 mg, Oral, Q6H PRN, 50 mg at 05/31/22 1200 **OR** hydrOXYzine (VISTARIL) injection 50 mg, 50 mg, IntraMUSCular, T2E PRN, ERNESTINE Braga - CNP    haloperidol lactate (HALDOL) injection 5 mg, 5 mg, IntraMUSCular, Q6H PRN **OR** haloperidol (HALDOL) tablet 5 mg, 5 mg, Oral, O6W PRN, ERNESTINE Braga - TAMMY      Examination:  /76   Pulse 73   Temp 97.6 °F (36.4 °C) (Oral)   Resp 16   Ht 6' 4\" (1.93 m)   Wt 260 lb (117.9 kg)   SpO2 99%   BMI 31.65 kg/m²   Gait - steady  Medication side effects(SE): no    Mental Status Examination:    Level of consciousness:  within normal limits   Appearance:  fair grooming and fair hygiene  Behavior/Motor:  Less psychomotor retardation  Attitude toward examiner:  attentive  Speech:  slow   Mood: less depressed  Affect:  blunted  Thought processes:  coherent   Thought content:  Suicidal Ideation:  denies  Cognition:  oriented to person, place, and time   Concentration better  Insight better  Judgement better    ASSESSMENT:   Patient symptoms are:  [] Well controlled  [x] Improving  [] Worsening  [] No change      Diagnosis:   Principal Problem:    Bipolar disord, crnt episode mixed, severe, w psych features [facility])  Resolved Problems:    * No resolved hospital problems. *      LABS:    No results for input(s): WBC, HGB, PLT in the last 72 hours. No results for input(s): NA, K, CL, CO2, BUN, CREATININE, GLUCOSE in the last 72 hours. No results for input(s): BILITOT, ALKPHOS, AST, ALT in the last 72 hours. Lab Results   Component Value Date    LABAMPH Neg 05/24/2022    BARBSCNU POSITIVE 05/24/2022    LABBENZ POSITIVE 05/24/2022    LABMETH Neg 05/24/2022    OPIATESCREENURINE Neg 05/24/2022    PHENCYCLIDINESCREENURINE Neg 05/24/2022    ETOH <10 05/24/2022     Lab Results   Component Value Date    TSH 1.540 05/24/2022     No results found for: LITHIUM  Lab Results   Component Value Date    VALPROATE 44.5 (L) 05/31/2022       Treatment Plan:  Reviewed current Medications with the patient. Medication as ordered  Change XR to IR seroquel  Risks, benefits, side effects, drug-to-drug interactions and alternatives to treatment were discussed. Collateral information:   CD evaluation  Encourage patient to attend group and other milieu activities.   Discharge planning discussed with the patient and treatment team.    PSYCHOTHERAPY/COUNSELING:  [x] Therapeutic interview  [x] Supportive  [] CBT  [] Ongoing  [] Other         [x] Patient continues to need, on a daily basis, active treatment furnished directly by or requiring the supervision of inpatient psychiatric personnel      Anticipated Length of stay:            Electronically signed by Essie Jerry MD on 5/31/2022 at 12:50 PM

## 2022-05-31 NOTE — PROGRESS NOTES
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 Pt reports depression and anxiety is improving. Pt noted to have good appetite and sleeping well. Pt has good eye contact, able to concentrate on subject.

## 2022-06-01 VITALS
HEART RATE: 71 BPM | WEIGHT: 260 LBS | SYSTOLIC BLOOD PRESSURE: 134 MMHG | TEMPERATURE: 98.1 F | HEIGHT: 76 IN | OXYGEN SATURATION: 98 % | DIASTOLIC BLOOD PRESSURE: 74 MMHG | BODY MASS INDEX: 31.66 KG/M2 | RESPIRATION RATE: 20 BRPM

## 2022-06-01 PROCEDURE — 6370000000 HC RX 637 (ALT 250 FOR IP): Performed by: NURSE PRACTITIONER

## 2022-06-01 PROCEDURE — 99239 HOSP IP/OBS DSCHRG MGMT >30: CPT | Performed by: PSYCHIATRY & NEUROLOGY

## 2022-06-01 PROCEDURE — 6370000000 HC RX 637 (ALT 250 FOR IP): Performed by: PSYCHIATRY & NEUROLOGY

## 2022-06-01 RX ORDER — QUETIAPINE FUMARATE 300 MG/1
300 TABLET, FILM COATED ORAL NIGHTLY
Qty: 15 TABLET | Refills: 3 | Status: SHIPPED | OUTPATIENT
Start: 2022-06-01 | End: 2022-09-19 | Stop reason: SDUPTHER

## 2022-06-01 RX ORDER — HYDROXYZINE PAMOATE 50 MG/1
50 CAPSULE ORAL 2 TIMES DAILY PRN
Qty: 30 CAPSULE | Refills: 2 | Status: SHIPPED | OUTPATIENT
Start: 2022-06-01 | End: 2022-06-15

## 2022-06-01 RX ORDER — VALSARTAN 320 MG/1
320 TABLET ORAL DAILY
Qty: 30 TABLET | Refills: 0 | Status: SHIPPED | OUTPATIENT
Start: 2022-06-01 | End: 2022-09-19 | Stop reason: SDUPTHER

## 2022-06-01 RX ORDER — ATORVASTATIN CALCIUM 20 MG/1
20 TABLET, FILM COATED ORAL DAILY
Qty: 30 TABLET | Refills: 1 | Status: SHIPPED | OUTPATIENT
Start: 2022-06-01 | End: 2022-09-19

## 2022-06-01 RX ORDER — AMLODIPINE BESYLATE 10 MG/1
10 TABLET ORAL DAILY
Qty: 30 TABLET | Refills: 1 | Status: SHIPPED | OUTPATIENT
Start: 2022-06-01 | End: 2022-09-19 | Stop reason: SDUPTHER

## 2022-06-01 RX ORDER — HYDROCHLOROTHIAZIDE 25 MG/1
25 TABLET ORAL EVERY MORNING
Qty: 30 TABLET | Refills: 1 | Status: SHIPPED | OUTPATIENT
Start: 2022-06-01 | End: 2022-09-19

## 2022-06-01 RX ORDER — DIVALPROEX SODIUM 250 MG/1
250 TABLET, DELAYED RELEASE ORAL EVERY 8 HOURS SCHEDULED
Qty: 45 TABLET | Refills: 3 | Status: SHIPPED | OUTPATIENT
Start: 2022-06-01 | End: 2022-09-19 | Stop reason: SDUPTHER

## 2022-06-01 RX ADMIN — HYDROCHLOROTHIAZIDE 25 MG: 12.5 TABLET ORAL at 09:05

## 2022-06-01 RX ADMIN — VALSARTAN 320 MG: 160 TABLET, FILM COATED ORAL at 09:05

## 2022-06-01 RX ADMIN — HYDROXYZINE PAMOATE 50 MG: 50 CAPSULE ORAL at 09:07

## 2022-06-01 RX ADMIN — AMLODIPINE BESYLATE 10 MG: 10 TABLET ORAL at 09:05

## 2022-06-01 RX ADMIN — DIVALPROEX SODIUM 250 MG: 250 TABLET, DELAYED RELEASE ORAL at 06:02

## 2022-06-01 RX ADMIN — ATORVASTATIN CALCIUM 20 MG: 20 TABLET, FILM COATED ORAL at 09:05

## 2022-06-01 NOTE — PROGRESS NOTES
Department of Psychiatry  Attending Progress Note      SUBJECTIVE:  Patient interviewed in his room at bedside   Reports brought in for insomnia, paranoia, agitation and feeling disrespected by people around him. At the time of admission patient was continuing to endorse auditory hallucinations.  Patient described his mood swings as dangerous and intense that would spawn homicidal ideations  Reports poor sleep, poor appetite  OBJECTIVE  In bed sleepy, anxious about his ongoing homicidal ideations  Physical  VITALS:  /84   Pulse 85   Temp 97.8 °F (36.6 °C) (Oral)   Resp 16   Ht 6' 4\" (1.93 m)   Wt 260 lb (117.9 kg)   SpO2 98%   BMI 31.65 kg/m²   CONSTITUTIONAL:  awake, alert, cooperative, no apparent distress, and appears stated age  Mental Status Examination:       Sleepy, Oriented to self place situation and time  In his own clothes in bed  Psychomotor tone increased  Mood anxious depressed overwhelmed, worried  Affect tense, constricted  Speech fast, mumbling volume  low  Thought process organized   Thought content:  Admits to suicidal ideations  Denies suicidal intent  Denies suicidal plan  Admits to homicidal ideations, denies intent or plan  denies  command auditory hallucinations  Paranoia ellicited, no other delusions  Cognition on gross exam intact  Insight poor  Judgement poor  Memory intact  Impulse control poor  Coping skills minimal      Data  Labs:    CBC with Differential:    Lab Results   Component Value Date    WBC 7.2 05/24/2022    RBC 4.95 05/24/2022    HGB 16.9 05/24/2022    HCT 49.9 05/24/2022     05/24/2022    .8 05/24/2022    MCH 34.2 05/24/2022    MCHC 33.9 05/24/2022    RDW 14.4 05/24/2022    BANDSPCT 4 08/06/2019    LYMPHOPCT 29.4 05/24/2022    MONOPCT 8.2 05/24/2022    BASOPCT 0.8 05/24/2022    MONOSABS 0.6 05/24/2022    LYMPHSABS 2.1 05/24/2022    EOSABS 0.1 05/24/2022    BASOSABS 0.1 05/24/2022     CMP:    Lab Results   Component Value Date     05/24/2022 K 4.3 05/24/2022     05/24/2022    CO2 23 05/24/2022    BUN 8 05/24/2022    CREATININE 0.82 05/24/2022    GFRAA >60.0 05/24/2022    LABGLOM >60.0 05/24/2022    GLUCOSE 92 05/24/2022    PROT 6.7 05/24/2022    LABALBU 3.9 05/24/2022    CALCIUM 9.4 05/24/2022    BILITOT 0.3 05/24/2022    ALKPHOS 76 05/24/2022    AST 17 05/24/2022    ALT 30 05/24/2022     BMP:    Lab Results   Component Value Date     05/24/2022    K 4.3 05/24/2022     05/24/2022    CO2 23 05/24/2022    BUN 8 05/24/2022    LABALBU 3.9 05/24/2022    CREATININE 0.82 05/24/2022    CALCIUM 9.4 05/24/2022    GFRAA >60.0 05/24/2022    LABGLOM >60.0 05/24/2022    GLUCOSE 92 05/24/2022       Medications  Current Facility-Administered Medications: QUEtiapine (SEROQUEL) tablet 300 mg, 300 mg, Oral, Nightly  divalproex (DEPAKOTE) DR tablet 250 mg, 250 mg, Oral, 3 times per day  amLODIPine (NORVASC) tablet 10 mg, 10 mg, Oral, Daily  atorvastatin (LIPITOR) tablet 20 mg, 20 mg, Oral, Daily  hydroCHLOROthiazide (HYDRODIURIL) tablet 25 mg, 25 mg, Oral, QAM  valsartan (DIOVAN) tablet 320 mg, 320 mg, Oral, Daily  acetaminophen (TYLENOL) tablet 650 mg, 650 mg, Oral, Q4H PRN  polyethylene glycol (GLYCOLAX) packet 17 g, 17 g, Oral, Daily PRN  traZODone (DESYREL) tablet 50 mg, 50 mg, Oral, Nightly PRN  aluminum & magnesium hydroxide-simethicone (MAALOX) 200-200-20 MG/5ML suspension 30 mL, 30 mL, Oral, Q6H PRN  chlordiazePOXIDE (LIBRIUM) capsule 25 mg, 25 mg, Oral, Q6H PRN  hydrOXYzine (VISTARIL) capsule 50 mg, 50 mg, Oral, Q6H PRN **OR** hydrOXYzine (VISTARIL) injection 50 mg, 50 mg, IntraMUSCular, Q6H PRN  haloperidol lactate (HALDOL) injection 5 mg, 5 mg, IntraMUSCular, Q6H PRN **OR** haloperidol (HALDOL) tablet 5 mg, 5 mg, Oral, Q6H PRN    ASSESSMENT AND PLAN    Patient remains a high risk for acting out aggressive impulses  Diagnosis: bipolar 1 recurrent, mixed  Plan: continue current medications  . Rico Grimes MD

## 2022-06-01 NOTE — PROGRESS NOTES
Pt. refused to attend the 1000 skills group, despite staff encouragement. Electronically signed by Ney Barriga, [address] on 6/1/2022 at 2:09 PM

## 2022-06-01 NOTE — PROGRESS NOTES
Morning Community Meeting Topics    Cathi Glass attended the morning community meeting on 6/1/22. Topics discussed today     [x] Introduction   Day of the week and date   Mask distribution   Current mask requirements  [x]Teams   Explanation of  Green and Blue team criteria   Nurses assigned to each team for today   Explanation about green and blue paper  o Date  o Patient's Name  o Patient's Nurse  o Goals  [x] Visitation   Announce the visiting hours for the day   Announce which team is allowed to have visitors for the day   Review any updated Covid 19 requirements for visitors during visitation  o Vaccine Card or negative Covid test within 48 hours of visit  o State Identification   Patients are reminded to alert the  at least 1 hour before visitation   [x] Unit Orientation   Coffee use   Phone location and etiquette   Shower locations  [employer] and dryer location and process   Common area expectations   Staff rounds expectation  [x] Meals    Educate patient to the menu  o The patient is encouraged to fill out the menu to get preferences at mealtime  o The patient is educated that if they do not fill out the menu, they will get the standard tray  o The coffee pot is decaf, patient encouraged to order regular coffee from menu.    Educate patient to the meal process   Patient encouraged to eat snacks provided twice daily  o Snacks may stay in patient room     [x] Discharge Process   Discharge expectations   Fill out the survey after discharge   [x] Hygiene   Daily showers encouraged  o Showers availability discussed    Daily dressing encouraged  o Discussed wearing street clothing   Education provided on where to place linens and clothing  o Linens in the hamper  o personal clothing does not go into the linen hamper  [x] Group    Patient encouraged to attend group provided   Time of Group Meetings discussed   Gentle reminder that attendance is a Physician order  [x] Movement   Chair exercises completed   Stretching completed  Notes: Goal - \"To go home\" Electronically signed by AURELIA Pickens on 6/1/2022 at 2:08 PM

## 2022-06-01 NOTE — CARE COORDINATION
Reports that pt lives with his baby kathya, his daughter at  The address listed on facesheet. Pt does not own firearms. This location has no firearms, current  Girlfriend and one of his children there. Ex spouse does not want pt returning to his apartment due to it not being helpful in his recovery. Concerned that he will discontinue the medications. Wants pt to call her to discuss. She is concerned that the environment is toxic. Reports she is able to pick pt up following picking up their daughter at noon. Concerned pt wont continue his medications at discharge. She wants to speak with pt. Informed PT to call her. Spoke with pt post phone call. Reports that she is picking him up. Discussed her not wanting him to return home, he reports this is not her choice. Reports she wants him to go to treatment center. GF did say no firearms in the home, does not believe he will continue with his meds or follow up care.  Electronically signed by DREW Moreno on 6/1/2022 at 10:14 AM

## 2022-06-01 NOTE — GROUP NOTE
Group Therapy Note    Date: 5/31/2022    Group Start Time: 1915  Group End Time: 2000  Group Topic: Recreational    [id number]    Curtis Smith        Group Therapy Note    Attendees: 21         Patient's Goal:  To play cards and socialize with peers     Notes:  Pt watched peers play a card game and socialized with peers     Status After Intervention:  Unchanged    Participation Level:  Active Listener and Interactive    Participation Quality: Appropriate and Attentive      Speech:  normal      Thought Process/Content: Logical      Affective Functioning: Congruent      Mood: euthymic      Level of consciousness:  Alert and Oriented x4      Response to Learning: Able to verbalize current knowledge/experience      Endings: None Reported    Modes of Intervention: Support, Socialization and Activity      Discipline Responsible: Behavorial Health Tech      Signature:  Curtis Smith

## 2022-06-01 NOTE — PROGRESS NOTES
CLINICAL PHARMACY NOTE: MEDS TO BEDS    Total # of Prescriptions Filled: 7   The following medications were delivered to the patient:  · Quetiapine 300mg tab  · Amlodipine 10mg tab  · Divalproex Dr 250mg tab  · Atorvastatin 20mg tab  · Hydrochlorothiazide 25mg tab  · Valsartan 320mg tab  · Hydroxyzine Pamoate 50mg cap    Additional Documentation:

## 2022-06-01 NOTE — DISCHARGE INSTR - DIET

## 2022-06-01 NOTE — DISCHARGE SUMMARY
DISCHARGE SUMMARY      Patient ID:  Melody Plascencia  [id number]  42 y.o.  1980      Admit date: 5/24/2022    Discharge date and time: 6/1/2022    Admitting Physician: ERNESTINE Orosco - CNP     Discharge Physician: Dr Carlos Lowe MD    Admission Diagnoses: Recurrent major depressive disorder, remission status unspecified ([facility] [F33.9]  Major depression, recurrent ([facility] [F33.9]    Admission Condition: poor    Discharged Condition: stable    Admission Circumstance:       Presented to ED via Squad after he could no longer stay with his friend.  Reports increased depression, anxiety, & suicidal ideation with no plan. Denies history of suicidal gestures. Reports fleeting homocidal ideation towards no specific person. Denies homocidal plan. States, \"it could be anyone who I get mad at\". Reports depression & anxiety D/T  multiple stressors,e.g. no income, no housing, & legal issues. Patient reports medication noncompliance with all medications for the last two months. Denies A/V hallucinations. No delusions or paranoia noted. Reports disturbed sleep & denies appetite disturbances.     Currently homeless since release from [facility] last Tuesday for unpaid child support. Patient has three children (22,16, & 6years of age) Reports he has not had a steady job for the last eight years.  Reports no income, but has applied for social security, & has been denied three times.      HISTORY OF PRESENT ILLNESS:       The patient is a 42 y.o. male single currently homeless for a week, was living with ex fiancee with significant past history of MDD, DONTE     Pt has been feeling depressed chronically, but recently it has been worse  Pt has been getting more angry, no control over that  Destroyed the house, impulsive, not know how to control it  Mood swings racing thoughts  Has been impacting the relationship  I don't feel safe, what I am going to do next, fearful that he might harm someone out of anger, not in control  Pt has been having suicidal - nothing his helping  Thinking about various plans but did not act  Pt is more worried about harming others out of anger than hurting self  Pt was released from [facility] long-term for non payment of child support - $ 6000.     Stressors:got kicked out of her fiancee house following verbal argument, nothing physical, homeless, one child with her- 6year old.     The patient is not currently receiving care for the above psychiatric illness.     Medications Prior to Admission:     Prescriptions Prior to Admission   Medications Prior to Admission: sodium chloride (ALTAMIST SPRAY) 0.65 % nasal spray, 1 spray by Nasal route as needed for Congestion (Patient not taking: Reported on 5/24/2022)  atorvastatin (LIPITOR) 20 MG tablet, Take 1 tablet by mouth daily (Patient not taking: Reported on 5/24/2022)  valsartan (DIOVAN) 320 MG tablet, Take 1 tablet by mouth daily (Patient not taking: Reported on 5/24/2022)  hydroCHLOROthiazide (HYDRODIURIL) 25 MG tablet, Take 1 tablet by mouth every morning (Patient not taking: Reported on 5/24/2022)  amLODIPine (NORVASC) 10 MG tablet, Take 1 tablet by mouth daily (Patient not taking: Reported on 5/24/2022)  lisinopril (PRINIVIL;ZESTRIL) 10 MG tablet, TAKE 1 TABLET BY MOUTH EVERY DAY (Patient not taking: Reported on 5/24/2022)  pantoprazole (PROTONIX) 40 MG tablet, TAKE 1 TABLET BY MOUTH EVERY DAY IN THE MORNING before BREAKFAST (Patient not taking: Reported on 5/24/2022)  citalopram (CELEXA) 20 MG tablet, TAKE 1 TABLET BY MOUTH EVERY DAY (Patient not taking: Reported on 5/24/2022)  albuterol sulfate  (90 Base) MCG/ACT inhaler, Inhale 2 puffs every 6 hours as needed for wheezing (Patient not taking: Reported on 5/24/2022)  loratadine (CLARITIN) 10 MG tablet, TAKE 1 TABLET BY MOUTH EVERY DAY (Patient not taking: Reported on 5/24/2022)  fluticasone (FLONASE) 50 MCG/ACT nasal spray, 1 spray by Nasal route daily (Patient not taking: Reported on 5/24/2022)    Compliance:no     Psychiatric Review of Systems       Depression: yes     Keeley or Hypomania:  yes      Panic Attacks:  yes      Phobias:  no     Obsessions and Compulsions:  no     PTSD : no     Hallucinations:  yes - hear voices talking to him 2 weeks ago     Delusions:  yes - paranoid thoughts ++     Substance Abuse History:  ETOH:  Alcohol use a fifth a day, not drinking for 2 weeks, was at [employer] 3 weeks ago, got out and has been doing  Marijuana: no  Opiates: no  Other Drugs: no        Past Psychiatric History:  Prior Diagnosis:  MDD, alcohlism  Psychiatrist: no  Therapist:no  Hospitalization: yes  Hx of Suicidal Attempts: yes  Hx of violence:  yes  ECT: no  Previous discontinued Psychiatric Med Trials: celexa, zoloft, buspar         PAST MEDICAL/PSYCHIATRIC HISTORY:   Past Medical History:   Diagnosis Date    Depression     Hypertension 2015    trx with pills x 1 month / patient did not follow up    Smoker        FAMILY/SOCIAL HISTORY:  Family History   Problem Relation Age of Onset    High Blood Pressure Mother     Heart Disease Mother     Other Mother         copd / smoker    Stroke Father     High Blood Pressure Brother     Other Brother         anxiety     Social History     Socioeconomic History    Marital status: Single     Spouse name: Not on file    Number of children: Not on file    Years of education: Not on file    Highest education level: Not on file   Occupational History    Not on file   Tobacco Use    Smoking status: Current Every Day Smoker     Packs/day: 1.00     Years: 20.00     Pack years: 20.00     Types: Cigarettes    Smokeless tobacco: Never Used   Substance and Sexual Activity    Alcohol use: Not Currently     Alcohol/week: 6.0 standard drinks     Types: 6 Cans of beer per week     Comment: 6 pkg per day    Drug use: Yes     Types: Marijuana (Weed)     Comment: 2-3 x per week and benzos     Sexual activity: Not on file   Other Topics Concern    Not on file   Social History Narrative    Not on file     Social Determinants of Health     Financial Resource Strain:     Difficulty of Paying Living Expenses: Not on file   Food Insecurity:     Worried About Running Out of Food in the Last Year: Not on file    Kehinde of Food in the Last Year: Not on file   Transportation Needs:     Lack of Transportation (Medical): Not on file    Lack of Transportation (Non-Medical):  Not on file   Physical Activity:     Days of Exercise per Week: Not on file    Minutes of Exercise per Session: Not on file   Stress:     Feeling of Stress : Not on file   Social Connections:     Frequency of Communication with Friends and Family: Not on file    Frequency of Social Gatherings with Friends and Family: Not on file    Attends Nondenominational Services: Not on file    Active Member of [employer] or Organizations: Not on file    Attends Club or Organization Meetings: Not on file    Marital Status: Not on file   Intimate Partner Violence:     Fear of Current or Ex-Partner: Not on file    Emotionally Abused: Not on file    Physically Abused: Not on file    Sexually Abused: Not on file   Housing Stability:     Unable to Pay for Housing in the Last Year: Not on file    Number of [place] in the Last Year: Not on file    Unstable Housing in the Last Year: Not on file       MEDICATIONS:    Current Facility-Administered Medications:     QUEtiapine (SEROQUEL) tablet 300 mg, 300 mg, Oral, Nightly, Chaz Sandhu MD, 300 mg at 05/31/22 2138    divalproex (DEPAKOTE) DR tablet 250 mg, 250 mg, Oral, 3 times per day, Chaz Sandhu MD, 250 mg at 06/01/22 0602    amLODIPine (NORVASC) tablet 10 mg, 10 mg, Oral, Daily, Estefany Bathe, APRN - NP, 10 mg at 06/01/22 0905    atorvastatin (LIPITOR) tablet 20 mg, 20 mg, Oral, Daily, Estefany Bathe, APRN - NP, 20 mg at 06/01/22 0905    hydroCHLOROthiazide (HYDRODIURIL) tablet 25 mg, 25 mg, Oral, QAM, Estefany Bathe, APRN - NP, 25 mg at 06/01/22 0905    valsartan (DIOVAN) tablet 320 mg, 320 mg, Oral, Daily, Daija Reusing, APRN - NP, 320 mg at 06/01/22 9689    acetaminophen (TYLENOL) tablet 650 mg, 650 mg, Oral, Z5G PRN, Charyl Bamberger Dellick, APRN - CNP    polyethylene glycol (GLYCOLAX) packet 17 g, 17 g, Oral, Daily PRN, Charyl Bamberger Dellick, APRN - CNP    traZODone (DESYREL) tablet 50 mg, 50 mg, Oral, Nightly PRN, Charyl Bamberger Dellick, APRN - CNP, 50 mg at 05/28/22 2241    aluminum & magnesium hydroxide-simethicone (MAALOX) 200-200-20 MG/5ML suspension 30 mL, 30 mL, Oral, K6E PRN, Charyl Bamberger Dellick, APRN - CNP    chlordiazePOXIDE (LIBRIUM) capsule 25 mg, 25 mg, Oral, P5F PRN, Rhesa ChiahuaERNESTINE - CNP, 25 mg at 05/24/22 2201    hydrOXYzine (VISTARIL) capsule 50 mg, 50 mg, Oral, Q6H PRN, 50 mg at 06/01/22 0907 **OR** hydrOXYzine (VISTARIL) injection 50 mg, 50 mg, IntraMUSCular, F7R PRN, Charyl Bamberger Dellick, APRN - CNP    haloperidol lactate (HALDOL) injection 5 mg, 5 mg, IntraMUSCular, Q6H PRN **OR** haloperidol (HALDOL) tablet 5 mg, 5 mg, Oral, T0W PRN, Charyl Bamberger Dellick, APRN - TAMMY    Examination:  /74   Pulse 71   Temp 98.1 °F (36.7 °C) (Oral)   Resp 20   Ht 6' 4\" (1.93 m)   Wt 260 lb (117.9 kg)   SpO2 98%   BMI 31.65 kg/m²   Gait - steady    HOSPITAL COURSE[de-identified]  Following admission to the hospital, patient had a complete physical exam and blood work up  Patient was monitored closely with suicide precaution  Patient was started on medication as listed below  Was encouraged to participate in group and other milieu activity  Patient started to feel better with this combination of treatment. Significant progress in the symptoms since admission.     Mood better, with the score of 2/10 - bad  No AVH or paranoid thoughts  No Hopeless or worthless feeling  No active SI/HI  Appetite:  [x] Normal  [] Increased  [] Decreased    Sleep:       [x] Normal  [] Fair       [] Poor            Energy:    [x] Normal  [] Increased  [] Decreased     SI [] Present  [x] Absent  HI  []Present  [x] Absent   Aggression:  [] yes  [] no  Patient is [x] able  [] unable to CONTRACT FOR SAFETY   Medication side effects(SE):  [x] None(Psych. Meds.) [] Other      Mental Status Examination on discharge:    Level of consciousness:  within normal limits   Appearance:  well-appearing  Behavior/Motor:  no abnormalities noted  Attitude toward examiner:  attentive and good eye contact  Speech:  spontaneous, normal rate and normal volume   Mood: euthymic  Affect:  mood congruent  Thought processes:  goal directed   Thought content:  Suicidal Ideation:  denies suicidal ideation  Cognition:  oriented to person, place, and time   Concentration intact  Memory intact  Insight good   Judgement fair   Fund of Knowledge adequate      ASSESSMENT:  Patient symptoms are:  [x] Well controlled  [x] Improving  [] Worsening  [] No change      Diagnosis:  Principal Problem:    Bipolar disord, crnt episode mixed, severe, w psych features ([address]  Resolved Problems:    * No resolved hospital problems. *      LABS:    No results for input(s): WBC, HGB, PLT in the last 72 hours. No results for input(s): NA, K, CL, CO2, BUN, CREATININE, GLUCOSE in the last 72 hours. No results for input(s): BILITOT, ALKPHOS, AST, ALT in the last 72 hours. Lab Results   Component Value Date    LABAMPH Neg 05/24/2022    BARBSCNU POSITIVE 05/24/2022    LABBENZ POSITIVE 05/24/2022    LABMETH Neg 05/24/2022    OPIATESCREENURINE Neg 05/24/2022    PHENCYCLIDINESCREENURINE Neg 05/24/2022    ETOH <10 05/24/2022     Lab Results   Component Value Date    TSH 1.540 05/24/2022     No results found for: LITHIUM  Lab Results   Component Value Date    VALPROATE 44.5 (L) 05/31/2022       RISK ASSESSMENT AT DISCHARGE: Low risk for suicide and homicide. Treatment Plan:  Reviewed current Medications with the patient. Education provided on the complaince with treatment.     Risks, benefits, side effects, drug-to-drug interactions and alternatives to treatment were discussed. Encourage patient to attend outpatient follow up appointment and therapy. Patient was advised to call the outpatient provider, visit the nearest ED or call 911 if symptoms are not manageable. Patient's family member was contacted prior to the discharge. Medication List      START taking these medications    divalproex 250 MG DR tablet  Commonly known as: DEPAKOTE  Take 1 tablet by mouth every 8 hours     hydrOXYzine 50 MG capsule  Commonly known as: VISTARIL  Take 1 capsule by mouth 2 times daily as needed for Anxiety     QUEtiapine 300 MG tablet  Commonly known as: SEROQUEL  Take 1 tablet by mouth nightly        CONTINUE taking these medications    albuterol sulfate  (90 Base) MCG/ACT inhaler     amLODIPine 10 MG tablet  Commonly known as: NORVASC  Take 1 tablet by mouth daily     atorvastatin 20 MG tablet  Commonly known as: LIPITOR  Take 1 tablet by mouth daily     fluticasone 50 MCG/ACT nasal spray  Commonly known as: FLONASE  1 spray by Nasal route daily     hydroCHLOROthiazide 25 MG tablet  Commonly known as: HYDRODIURIL  Take 1 tablet by mouth every morning     sodium chloride 0.65 % nasal spray  Commonly known as:  Altamist Spray  1 spray by Nasal route as needed for Congestion     valsartan 320 MG tablet  Commonly known as: Diovan  Take 1 tablet by mouth daily        STOP taking these medications    citalopram 20 mg tablet  Commonly known as: CELEXA     lisinopril 10 MG tablet  Commonly known as: PRINIVIL;ZESTRIL     loratadine 10 MG tablet  Commonly known as: CLARITIN     pantoprazole 40 MG tablet  Commonly known as: PROTONIX           Where to Get Your Medications      [address]  [address] [place] [address], [address] [place] [address]    Phone: [phone]   · amLODIPine 10 MG tablet  · atorvastatin 20 MG tablet  · divalproex 250 MG DR tablet  · hydroCHLOROthiazide 25 MG tablet  · hydrOXYzine 50 MG capsule  · QUEtiapine 300 MG tablet  · valsartan 320 MG tablet           Reason for more than one antipsychotic:   [x] N/A  [] 3 failed monotherapy(drugs tried):  [] Cross over to a new antipsychotic  [] Taper to monotherapy from polypharmacy  [] Augmentation of Clozapine therapy due to treatment resistance to single therapy        TIME SPEND - 35 MINUTES TO COMPLETE THE EVALUATION, DISCHARGE SUMMARY, MEDICATION RECONCILIATION AND FOLLOW UP CARE     Signed:  Nas Lezama MD  6/1/2022  10:07 AM

## 2022-09-16 ENCOUNTER — TELEPHONE (OUTPATIENT)
Dept: FAMILY MEDICINE CLINIC | Age: 42
End: 2022-09-16

## 2022-09-16 NOTE — TELEPHONE ENCOUNTER
LMOM-needs to r/s appt with Dr. Evelia Pak, per dr Mohsen Clinton she will not refill those medication.      Please be advised

## 2022-09-19 ENCOUNTER — TELEMEDICINE (OUTPATIENT)
Dept: FAMILY MEDICINE CLINIC | Age: 42
End: 2022-09-19
Payer: COMMERCIAL

## 2022-09-19 DIAGNOSIS — F31.9 BIPOLAR AFFECTIVE DISORDER, REMISSION STATUS UNSPECIFIED (HCC): ICD-10-CM

## 2022-09-19 DIAGNOSIS — I10 ESSENTIAL HYPERTENSION: Primary | ICD-10-CM

## 2022-09-19 DIAGNOSIS — F41.9 ANXIETY: ICD-10-CM

## 2022-09-19 PROCEDURE — 99442 PR PHYS/QHP TELEPHONE EVALUATION 11-20 MIN: CPT | Performed by: INTERNAL MEDICINE

## 2022-09-19 RX ORDER — VALSARTAN 320 MG/1
320 TABLET ORAL DAILY
Qty: 30 TABLET | Refills: 0 | Status: SHIPPED | OUTPATIENT
Start: 2022-09-19 | End: 2022-10-31

## 2022-09-19 RX ORDER — HYDROXYZINE PAMOATE 50 MG/1
CAPSULE ORAL
Qty: 60 CAPSULE | Refills: 5 | Status: SHIPPED | OUTPATIENT
Start: 2022-09-19

## 2022-09-19 RX ORDER — QUETIAPINE FUMARATE 300 MG/1
300 TABLET, FILM COATED ORAL NIGHTLY
Qty: 15 TABLET | Refills: 3 | Status: SHIPPED | OUTPATIENT
Start: 2022-09-19

## 2022-09-19 RX ORDER — DIVALPROEX SODIUM 250 MG/1
250 TABLET, DELAYED RELEASE ORAL EVERY 8 HOURS SCHEDULED
Qty: 45 TABLET | Refills: 3 | Status: SHIPPED | OUTPATIENT
Start: 2022-09-19

## 2022-09-19 RX ORDER — CALCIUM CARBONATE 200(500)MG
1 TABLET,CHEWABLE ORAL DAILY
Qty: 30 TABLET | Refills: 0 | Status: SHIPPED | OUTPATIENT
Start: 2022-09-19 | End: 2022-10-19

## 2022-09-19 RX ORDER — AMLODIPINE BESYLATE 10 MG/1
10 TABLET ORAL DAILY
Qty: 30 TABLET | Refills: 1 | Status: SHIPPED | OUTPATIENT
Start: 2022-09-19

## 2022-09-19 RX ORDER — HYDROXYZINE PAMOATE 50 MG/1
CAPSULE ORAL
COMMUNITY
Start: 2022-08-12 | End: 2022-09-19 | Stop reason: SDUPTHER

## 2022-09-19 RX ORDER — PANTOPRAZOLE SODIUM 40 MG/1
40 TABLET, DELAYED RELEASE ORAL
Qty: 90 TABLET | Refills: 1 | Status: SHIPPED | OUTPATIENT
Start: 2022-09-19

## 2022-09-19 SDOH — ECONOMIC STABILITY: FOOD INSECURITY: WITHIN THE PAST 12 MONTHS, THE FOOD YOU BOUGHT JUST DIDN'T LAST AND YOU DIDN'T HAVE MONEY TO GET MORE.: NEVER TRUE

## 2022-09-19 SDOH — ECONOMIC STABILITY: FOOD INSECURITY: WITHIN THE PAST 12 MONTHS, YOU WORRIED THAT YOUR FOOD WOULD RUN OUT BEFORE YOU GOT MONEY TO BUY MORE.: NEVER TRUE

## 2022-09-19 ASSESSMENT — PATIENT HEALTH QUESTIONNAIRE - PHQ9
9. THOUGHTS THAT YOU WOULD BE BETTER OFF DEAD, OR OF HURTING YOURSELF: 0
SUM OF ALL RESPONSES TO PHQ QUESTIONS 1-9: 10
8. MOVING OR SPEAKING SO SLOWLY THAT OTHER PEOPLE COULD HAVE NOTICED. OR THE OPPOSITE, BEING SO FIGETY OR RESTLESS THAT YOU HAVE BEEN MOVING AROUND A LOT MORE THAN USUAL: 0
SUM OF ALL RESPONSES TO PHQ QUESTIONS 1-9: 10
SUM OF ALL RESPONSES TO PHQ QUESTIONS 1-9: 10
SUM OF ALL RESPONSES TO PHQ9 QUESTIONS 1 & 2: 6
7. TROUBLE CONCENTRATING ON THINGS, SUCH AS READING THE NEWSPAPER OR WATCHING TELEVISION: 3
1. LITTLE INTEREST OR PLEASURE IN DOING THINGS: 3
4. FEELING TIRED OR HAVING LITTLE ENERGY: 0
6. FEELING BAD ABOUT YOURSELF - OR THAT YOU ARE A FAILURE OR HAVE LET YOURSELF OR YOUR FAMILY DOWN: 1
5. POOR APPETITE OR OVEREATING: 0
SUM OF ALL RESPONSES TO PHQ QUESTIONS 1-9: 10
10. IF YOU CHECKED OFF ANY PROBLEMS, HOW DIFFICULT HAVE THESE PROBLEMS MADE IT FOR YOU TO DO YOUR WORK, TAKE CARE OF THINGS AT HOME, OR GET ALONG WITH OTHER PEOPLE: 1
2. FEELING DOWN, DEPRESSED OR HOPELESS: 3
3. TROUBLE FALLING OR STAYING ASLEEP: 0

## 2022-09-19 ASSESSMENT — SOCIAL DETERMINANTS OF HEALTH (SDOH): HOW HARD IS IT FOR YOU TO PAY FOR THE VERY BASICS LIKE FOOD, HOUSING, MEDICAL CARE, AND HEATING?: NOT HARD AT ALL

## 2022-09-19 ASSESSMENT — ENCOUNTER SYMPTOMS
COLOR CHANGE: 0
PHOTOPHOBIA: 0
EYE PAIN: 0
TROUBLE SWALLOWING: 0
SHORTNESS OF BREATH: 0
ABDOMINAL PAIN: 0
RECTAL PAIN: 0
DIARRHEA: 0
RHINORRHEA: 0
EYE ITCHING: 0
CONSTIPATION: 0
BLOOD IN STOOL: 0
WHEEZING: 0
NAUSEA: 0
COUGH: 0
VOICE CHANGE: 0
CHEST TIGHTNESS: 0
EYE REDNESS: 0
EYE DISCHARGE: 0
SINUS PAIN: 1
APNEA: 0
SORE THROAT: 0
BACK PAIN: 0
ABDOMINAL DISTENTION: 0
FACIAL SWELLING: 0
VOMITING: 0
SINUS PRESSURE: 1

## 2022-09-19 NOTE — PROGRESS NOTES
Subjective:      Patient ID: Stephani Giles is a 36 y.o. male who presents today for:  Chief Complaint   Patient presents with    Discuss Medications    Hypertension    Depression     Patient states he was in the pysch staples and taking meds but some meds ran out and some he just stop taking but he is regretting it now and needs refills and referrals. He does have a positive depression screening     43-year-old male with history of anxiety , bipolar disorder,hypertension and alcohol abuse presents for follow-up visit    Bipolar disorder and anxiety: Pt has been without his medications for a few weeks. Hypertension:  In regards to his hypertension he has been compliant with chlorthalidone 25 mg orally daily,  Norvasc 5 mg orally daily And lisinopril 10 mg orally daily. In spite of this his blood pressure is poorly controlled. Dyspepsia : The patient has been experiencing dyspepsia. Alcohol dependence: The patient has achieved sobriety for 3 months. At present he denies polyuria,  Polydipsia, constitutional, sinus, visual, cardiopulmonary, urologic, gastrointestinal, immunologic/hematologic, musculoskeletal, neurologic,dermatologic, or psychiatric complaints.       Past Medical History:   Diagnosis Date    Depression     Hypertension 2015    trx with pills x 1 month / patient did not follow up    Smoker      Past Surgical History:   Procedure Laterality Date    CO CREATE EARDRUM OPENING,GEN ANESTH Bilateral 2/8/2018    BILATERAL MYRINGOTOMY WITH VENTILING TUBE INSERTION (PAT DONE 1/22/18) performed by Juana Sanchez MD at [address] [place] [address] History     Socioeconomic History    Marital status: Single     Spouse name: Not on file    Number of children: Not on file    Years of education: Not on file    Highest education level: Not on file   Occupational History    Not on file   Tobacco Use    Smoking status: Every Day     Packs/day: 1.00     Years: 20.00     Pack years: 20.00     Types: Cigarettes    Smokeless tobacco: Never   Substance and Sexual Activity    Alcohol use: Not Currently     Alcohol/week: 6.0 standard drinks     Types: 6 Cans of beer per week     Comment: 6 pkg per day    Drug use: Yes     Types: Marijuana Juanetta Branch)     Comment: 2-3 x per week and benzos     Sexual activity: Not on file   Other Topics Concern    Not on file   Social History Narrative    Not on file     Social Determinants of Health     Financial Resource Strain: Low Risk     Difficulty of Paying Living Expenses: Not hard at all   Food Insecurity: No Food Insecurity    Worried About Running Out of Food in the Last Year: Never true    Ran Out of Food in the Last Year: Never true   Transportation Needs: Not on file   Physical Activity: Not on file   Stress: Not on file   Social Connections: Not on file   Intimate Partner Violence: Not on file   Housing Stability: Not on file     Family History   Problem Relation Age of Onset    High Blood Pressure Mother     Heart Disease Mother     Other Mother         copd / smoker    Stroke Father     High Blood Pressure Brother     Other Brother         anxiety     No Known Allergies  No current outpatient medications on file prior to visit. No current facility-administered medications on file prior to visit. Review of Systems   Constitutional:  Negative for chills, diaphoresis, fatigue and fever. HENT:  Positive for ear pain, sinus pressure and sinus pain. Negative for congestion, dental problem, drooling, ear discharge, facial swelling, hearing loss, mouth sores, nosebleeds, postnasal drip, rhinorrhea, sneezing, sore throat, tinnitus, trouble swallowing and voice change. Eyes:  Negative for photophobia, pain, discharge, redness, itching and visual disturbance. Respiratory:  Negative for apnea, cough, chest tightness, shortness of breath and wheezing. Cardiovascular:  Negative for chest pain, palpitations and leg swelling. Gastrointestinal:  Negative for abdominal distention, abdominal pain, blood in stool, constipation, diarrhea, nausea, rectal pain and vomiting. Endocrine: Negative for cold intolerance, heat intolerance, polydipsia, polyphagia and polyuria. Genitourinary:  Negative for decreased urine volume, difficulty urinating, dysuria, flank pain, frequency, genital sores, hematuria and urgency. Musculoskeletal:  Negative for arthralgias, back pain, gait problem, joint swelling, myalgias, neck pain and neck stiffness. Skin:  Negative for color change, rash and wound. Allergic/Immunologic: Negative for environmental allergies and food allergies. Neurological:  Negative for dizziness, tremors, seizures, syncope, facial asymmetry, speech difficulty, weakness, light-headedness, numbness and headaches. Hematological:  Negative for adenopathy. Does not bruise/bleed easily. Psychiatric/Behavioral:  Negative for agitation, confusion, decreased concentration, hallucinations, self-injury, sleep disturbance and suicidal ideas. The patient is not nervous/anxious. Objective: There were no vitals taken for this visit. Physical Exam  Constitutional:       General: He is not in acute distress. Appearance: He is well-developed. HENT:      Head: Normocephalic. Right Ear: External ear normal.      Left Ear: External ear normal.   Eyes:      Conjunctiva/sclera: Conjunctivae normal.      Pupils: Pupils are equal, round, and reactive to light. Neck:      Trachea: No tracheal deviation. Cardiovascular:      Rate and Rhythm: Normal rate and regular rhythm. Heart sounds: Normal heart sounds. Pulmonary:      Effort: Pulmonary effort is normal. No respiratory distress. Breath sounds: Normal breath sounds. No wheezing or rales. Chest:      Chest wall: No tenderness. Abdominal:      General: Bowel sounds are normal. There is no distension. Palpations: Abdomen is soft. There is no mass. Tenderness: There is no abdominal tenderness. There is no guarding or rebound. Musculoskeletal:         General: No tenderness or deformity. Cervical back: Neck supple. Skin:     General: Skin is warm and dry. Coloration: Skin is not pale. Findings: No erythema or rash. Neurological:      Mental Status: He is alert and oriented to person, place, and time. Psychiatric:         Thought Content: Thought content normal.         Judgment: Judgment normal.       Assessment:       Diagnosis Orders   1. Essential hypertension        2. Bipolar affective disorder, remission status unspecified ([facility]        3. Anxiety              Plan:      Franklin Sanchez was seen today for cough, congestion and foot pain. Diagnoses and all orders for this visit:    Essential hypertension-initiate triple therapy via olmesartan, Norvasc and hydrochlorothiazide. Dyspepsia- Protonix 40 mg orally daily. Depression: Continue Celexa 20 mg orally daily. TELEHEALTH EVALUATION -- Audio/Visual (During [address] emergency)    -   Niels Trujillo is a 36 y.o. male being evaluated by a Virtual Visit (video visit) encounter to address concerns as mentioned above. A caregiver was present when appropriate. Due to this being a TeleHealth encounter (During [facility]- public health emergency), evaluation of the following organ systems was limited: Vitals/Constitutional/EENT/Resp/CV/GI//MS/Neuro/Skin/Heme-Lymph-Imm. Pursuant to the emergency declaration under the [facility], [address] [place] [address] authority and the [employer] Act, this Virtual Visit was conducted with patient's (and/or legal guardian's) consent, to reduce the patient's risk of exposure to COVID-19 and provide necessary medical care.   The patient (and/or legal guardian) has also been advised to contact this office for worsening conditions or problems, and seek emergency medical treatment and/or call 911 if deemed necessary. Services were provided through a video synchronous discussion virtually to substitute for in-person clinic visit. Type of encounter was __ Doxy _x_ MyChart ___Facetime    Patient was located at their home. Provider was located at their ___ home or        __x__ office. --Jimena De La Rosa MD on 9/19/2022 at 2:50 PM    An electronic signature was used to authenticate this note. Bianca Pritchard is a 36 y.o. male evaluated via telephone on 9/19/2022 for Discuss Medications, Hypertension, and Depression (Patient states he was in the pysch staples and taking meds but some meds ran out and some he just stop taking but he is regretting it now and needs refills and referrals. He does have a positive depression screening)  . Documentation:  I communicated with the patient and/or health care decision maker about anxiety, bipolar disorder. Details of this discussion including any medical advice provided: please refer to note above    Total Time: minutes: 11-20 minutes    Enio Hill was evaluated through a synchronous (real-time) audio encounter. Patient identification was verified at the start of the visit. He (or guardian if applicable) is aware that this is a billable service, which includes applicable co-pays. This visit was conducted with the patient's (and/or legal guardian's) verbal consent. He has not had a related appointment within my department in the past 7 days or scheduled within the next 24 hours. The patient was located at Home: [address] [place] [address]. [place] [address]. The provider was located at [facility] (Appt Dept): [address] [place] [address],  [address] [place] [address]. Note: not billable if this call serves to triage the patient into an appointment for the relevant concern    Jimena De La Rosa MD     Return in about 6 weeks (around 10/31/2022).             Please note, this report has been partially produced using speech recognition software  and may cause  and /or contain errors related to that system including grammar, punctuation and spelling as well as words and phrases that may seem inappropriate. If there are questions or concerns please feel free to contact me to clarify.

## 2022-10-31 ENCOUNTER — TELEPHONE (OUTPATIENT)
Dept: FAMILY MEDICINE CLINIC | Age: 42
End: 2022-10-31

## 2022-10-31 RX ORDER — VALSARTAN 320 MG/1
320 TABLET ORAL DAILY
Qty: 30 TABLET | Refills: 0 | Status: SHIPPED | OUTPATIENT
Start: 2022-10-31 | End: 2022-11-30

## 2022-10-31 NOTE — TELEPHONE ENCOUNTER
Pharmacy requesting medication refill.  Please approve or deny this request.    Rx requested:  Requested Prescriptions     Pending Prescriptions Disp Refills    valsartan (DIOVAN) 320 MG tablet [Pharmacy Med Name: valsartan 320 mg tablet] 30 tablet 0     Sig: Take 1 tablet by mouth daily         Last Office Visit:   9/19/2022      Next Visit Date:  Future Appointments   Date Time Provider [place]   10/31/2022  4:00 PM J[facility]

## 2022-12-02 ENCOUNTER — HOSPITAL ENCOUNTER (INPATIENT)
Age: 42
LOS: 4 days | Discharge: HOME | DRG: 753 | End: 2022-12-06
Attending: PSYCHIATRY & NEUROLOGY | Admitting: PSYCHIATRY & NEUROLOGY
Payer: COMMERCIAL

## 2022-12-02 DIAGNOSIS — F31.9 BIPOLAR DEPRESSION (HCC): Primary | ICD-10-CM

## 2022-12-02 LAB
ACETAMINOPHEN LEVEL: <5 UG/ML (ref 10–30)
ALBUMIN SERPL-MCNC: 4.8 G/DL (ref 3.5–4.6)
ALP BLD-CCNC: 104 U/L (ref 35–104)
ALT SERPL-CCNC: 21 U/L (ref 0–41)
AMPHETAMINE SCREEN, URINE: NORMAL
ANION GAP SERPL CALCULATED.3IONS-SCNC: 14 MEQ/L (ref 9–15)
AST SERPL-CCNC: 22 U/L (ref 0–40)
BARBITURATE SCREEN URINE: NORMAL
BASOPHILS ABSOLUTE: 0.1 K/UL (ref 0–0.2)
BASOPHILS RELATIVE PERCENT: 0.7 %
BENZODIAZEPINE SCREEN, URINE: NORMAL
BILIRUB SERPL-MCNC: 0.3 MG/DL (ref 0.2–0.7)
BILIRUBIN URINE: NEGATIVE
BLOOD, URINE: NEGATIVE
BUN BLDV-MCNC: 7 MG/DL (ref 6–20)
CALCIUM SERPL-MCNC: 9.6 MG/DL (ref 8.5–9.9)
CANNABINOID SCREEN URINE: NORMAL
CHLORIDE BLD-SCNC: 103 MEQ/L (ref 95–107)
CHOLESTEROL, TOTAL: 226 MG/DL (ref 0–199)
CLARITY: CLEAR
CO2: 26 MEQ/L (ref 20–31)
COCAINE METABOLITE SCREEN URINE: NORMAL
COLOR: YELLOW
CREAT SERPL-MCNC: 0.89 MG/DL (ref 0.7–1.2)
EOSINOPHILS ABSOLUTE: 0.2 K/UL (ref 0–0.7)
EOSINOPHILS RELATIVE PERCENT: 1.3 %
ETHANOL PERCENT: 0.07 G/DL
ETHANOL PERCENT: 0.17 G/DL
ETHANOL: 189 MG/DL (ref 0–0.08)
ETHANOL: 83 MG/DL (ref 0–0.08)
FENTANYL SCREEN, URINE: NORMAL
GFR SERPL CREATININE-BSD FRML MDRD: >60 ML/MIN/{1.73_M2}
GLOBULIN: 3.2 G/DL (ref 2.3–3.5)
GLUCOSE BLD-MCNC: 92 MG/DL (ref 70–99)
GLUCOSE URINE: NEGATIVE MG/DL
HCT VFR BLD CALC: 47.9 % (ref 42–52)
HDLC SERPL-MCNC: 28 MG/DL (ref 40–59)
HEMOGLOBIN: 16.1 G/DL (ref 14–18)
KETONES, URINE: NEGATIVE MG/DL
LDL CHOLESTEROL CALCULATED: 149 MG/DL (ref 0–129)
LEUKOCYTE ESTERASE, URINE: NEGATIVE
LYMPHOCYTES ABSOLUTE: 4.4 K/UL (ref 1–4.8)
LYMPHOCYTES RELATIVE PERCENT: 35.9 %
Lab: NORMAL
MCH RBC QN AUTO: 31.8 PG (ref 27–31.3)
MCHC RBC AUTO-ENTMCNC: 33.5 % (ref 33–37)
MCV RBC AUTO: 94.8 FL (ref 79–92.2)
METHADONE SCREEN, URINE: NORMAL
MONOCYTES ABSOLUTE: 0.6 K/UL (ref 0.2–0.8)
MONOCYTES RELATIVE PERCENT: 4.7 %
NEUTROPHILS ABSOLUTE: 7.1 K/UL (ref 1.4–6.5)
NEUTROPHILS RELATIVE PERCENT: 57.4 %
NITRITE, URINE: NEGATIVE
OPIATE SCREEN URINE: NORMAL
OXYCODONE URINE: NORMAL
PDW BLD-RTO: 14.3 % (ref 11.5–14.5)
PH UA: 6 (ref 5–9)
PHENCYCLIDINE SCREEN URINE: NORMAL
PLATELET # BLD: 220 K/UL (ref 130–400)
POTASSIUM SERPL-SCNC: 4.8 MEQ/L (ref 3.4–4.9)
PROPOXYPHENE SCREEN: NORMAL
PROTEIN UA: NEGATIVE MG/DL
RBC # BLD: 5.05 M/UL (ref 4.7–6.1)
SALICYLATE, SERUM: <0.3 MG/DL (ref 15–30)
SARS-COV-2, NAAT: NOT DETECTED
SODIUM BLD-SCNC: 143 MEQ/L (ref 135–144)
SPECIFIC GRAVITY UA: 1.01 (ref 1–1.03)
TOTAL CK: 272 U/L (ref 0–190)
TOTAL PROTEIN: 8 G/DL (ref 6.3–8)
TRIGL SERPL-MCNC: 247 MG/DL (ref 0–150)
TSH SERPL DL<=0.05 MIU/L-ACNC: 1.65 UIU/ML (ref 0.44–3.86)
URINE REFLEX TO CULTURE: NORMAL
UROBILINOGEN, URINE: 0.2 E.U./DL
VALPROIC ACID LEVEL: 13.5 UG/ML (ref 50–100)
WBC # BLD: 12.3 K/UL (ref 4.8–10.8)

## 2022-12-02 PROCEDURE — 85025 COMPLETE CBC W/AUTO DIFF WBC: CPT

## 2022-12-02 PROCEDURE — 80307 DRUG TEST PRSMV CHEM ANLYZR: CPT

## 2022-12-02 PROCEDURE — 82077 ASSAY SPEC XCP UR&BREATH IA: CPT

## 2022-12-02 PROCEDURE — 87635 SARS-COV-2 COVID-19 AMP PRB: CPT

## 2022-12-02 PROCEDURE — 80061 LIPID PANEL: CPT

## 2022-12-02 PROCEDURE — 36415 COLL VENOUS BLD VENIPUNCTURE: CPT

## 2022-12-02 PROCEDURE — 80143 DRUG ASSAY ACETAMINOPHEN: CPT

## 2022-12-02 PROCEDURE — 84443 ASSAY THYROID STIM HORMONE: CPT

## 2022-12-02 PROCEDURE — 93005 ELECTROCARDIOGRAM TRACING: CPT | Performed by: PSYCHIATRY & NEUROLOGY

## 2022-12-02 PROCEDURE — 6370000000 HC RX 637 (ALT 250 FOR IP): Performed by: PSYCHIATRY & NEUROLOGY

## 2022-12-02 PROCEDURE — 80053 COMPREHEN METABOLIC PANEL: CPT

## 2022-12-02 PROCEDURE — 81003 URINALYSIS AUTO W/O SCOPE: CPT

## 2022-12-02 PROCEDURE — 82550 ASSAY OF CK (CPK): CPT

## 2022-12-02 PROCEDURE — 80164 ASSAY DIPROPYLACETIC ACD TOT: CPT

## 2022-12-02 PROCEDURE — 99285 EMERGENCY DEPT VISIT HI MDM: CPT

## 2022-12-02 PROCEDURE — 80179 DRUG ASSAY SALICYLATE: CPT

## 2022-12-02 PROCEDURE — 1240000000 HC EMOTIONAL WELLNESS R&B

## 2022-12-02 RX ORDER — CHLORDIAZEPOXIDE HYDROCHLORIDE 25 MG/1
50 CAPSULE, GELATIN COATED ORAL
Status: DISCONTINUED | OUTPATIENT
Start: 2022-12-02 | End: 2022-12-06 | Stop reason: HOSPADM

## 2022-12-02 RX ORDER — HYDROXYZINE HYDROCHLORIDE 50 MG/ML
50 INJECTION, SOLUTION INTRAMUSCULAR EVERY 6 HOURS PRN
Status: DISCONTINUED | OUTPATIENT
Start: 2022-12-02 | End: 2022-12-06 | Stop reason: HOSPADM

## 2022-12-02 RX ORDER — LORAZEPAM 2 MG/ML
4 INJECTION INTRAMUSCULAR
Status: DISCONTINUED | OUTPATIENT
Start: 2022-12-02 | End: 2022-12-06 | Stop reason: HOSPADM

## 2022-12-02 RX ORDER — CHLORDIAZEPOXIDE HYDROCHLORIDE 5 MG/1
10 CAPSULE, GELATIN COATED ORAL EVERY 4 HOURS PRN
Status: DISCONTINUED | OUTPATIENT
Start: 2022-12-02 | End: 2022-12-06 | Stop reason: HOSPADM

## 2022-12-02 RX ORDER — HALOPERIDOL 5 MG/1
5 TABLET ORAL EVERY 6 HOURS PRN
Status: DISCONTINUED | OUTPATIENT
Start: 2022-12-02 | End: 2022-12-06 | Stop reason: HOSPADM

## 2022-12-02 RX ORDER — ACETAMINOPHEN 325 MG/1
650 TABLET ORAL EVERY 4 HOURS PRN
Status: DISCONTINUED | OUTPATIENT
Start: 2022-12-02 | End: 2022-12-06 | Stop reason: HOSPADM

## 2022-12-02 RX ORDER — HYDROXYZINE PAMOATE 50 MG/1
50 CAPSULE ORAL EVERY 6 HOURS PRN
Status: DISCONTINUED | OUTPATIENT
Start: 2022-12-02 | End: 2022-12-06 | Stop reason: HOSPADM

## 2022-12-02 RX ORDER — TRAZODONE HYDROCHLORIDE 50 MG/1
50 TABLET ORAL NIGHTLY PRN
Status: DISCONTINUED | OUTPATIENT
Start: 2022-12-02 | End: 2022-12-06 | Stop reason: HOSPADM

## 2022-12-02 RX ORDER — MAGNESIUM HYDROXIDE/ALUMINUM HYDROXICE/SIMETHICONE 120; 1200; 1200 MG/30ML; MG/30ML; MG/30ML
30 SUSPENSION ORAL PRN
Status: DISCONTINUED | OUTPATIENT
Start: 2022-12-02 | End: 2022-12-06 | Stop reason: HOSPADM

## 2022-12-02 RX ORDER — LANOLIN ALCOHOL/MO/W.PET/CERES
100 CREAM (GRAM) TOPICAL DAILY
Status: DISCONTINUED | OUTPATIENT
Start: 2022-12-02 | End: 2022-12-06 | Stop reason: HOSPADM

## 2022-12-02 RX ORDER — HALOPERIDOL 5 MG/ML
5 INJECTION INTRAMUSCULAR EVERY 6 HOURS PRN
Status: DISCONTINUED | OUTPATIENT
Start: 2022-12-02 | End: 2022-12-06 | Stop reason: HOSPADM

## 2022-12-02 RX ORDER — CHLORDIAZEPOXIDE HYDROCHLORIDE 25 MG/1
25 CAPSULE, GELATIN COATED ORAL EVERY 4 HOURS PRN
Status: DISCONTINUED | OUTPATIENT
Start: 2022-12-02 | End: 2022-12-06 | Stop reason: HOSPADM

## 2022-12-02 RX ORDER — BENZTROPINE MESYLATE 1 MG/ML
2 INJECTION INTRAMUSCULAR; INTRAVENOUS 2 TIMES DAILY PRN
Status: DISCONTINUED | OUTPATIENT
Start: 2022-12-02 | End: 2022-12-06 | Stop reason: HOSPADM

## 2022-12-02 RX ORDER — FOLIC ACID 1 MG/1
1 TABLET ORAL DAILY
Status: DISCONTINUED | OUTPATIENT
Start: 2022-12-02 | End: 2022-12-06 | Stop reason: HOSPADM

## 2022-12-02 RX ORDER — GAUZE BANDAGE 2" X 2"
100 BANDAGE TOPICAL DAILY
Status: DISCONTINUED | OUTPATIENT
Start: 2022-12-02 | End: 2022-12-02

## 2022-12-02 RX ORDER — CHLORDIAZEPOXIDE HYDROCHLORIDE 25 MG/1
75 CAPSULE, GELATIN COATED ORAL
Status: DISCONTINUED | OUTPATIENT
Start: 2022-12-02 | End: 2022-12-06 | Stop reason: HOSPADM

## 2022-12-02 RX ORDER — NICOTINE 21 MG/24HR
1 PATCH, TRANSDERMAL 24 HOURS TRANSDERMAL DAILY
Status: CANCELLED | OUTPATIENT
Start: 2022-12-03

## 2022-12-02 RX ORDER — MULTIVITAMIN WITH IRON
1 TABLET ORAL DAILY
Status: DISCONTINUED | OUTPATIENT
Start: 2022-12-02 | End: 2022-12-06 | Stop reason: HOSPADM

## 2022-12-02 RX ADMIN — CHLORDIAZEPOXIDE HYDROCHLORIDE 25 MG: 25 CAPSULE ORAL at 20:54

## 2022-12-02 RX ADMIN — FOLIC ACID 1 MG: 1 TABLET ORAL at 13:45

## 2022-12-02 RX ADMIN — Medication 100 MG: at 13:45

## 2022-12-02 RX ADMIN — THERA TABS 1 TABLET: TAB at 15:03

## 2022-12-02 RX ADMIN — TRAZODONE HYDROCHLORIDE 50 MG: 50 TABLET ORAL at 22:41

## 2022-12-02 ASSESSMENT — SLEEP AND FATIGUE QUESTIONNAIRES
DO YOU USE A SLEEP AID: YES
SLEEP PATTERN: DIFFICULTY FALLING ASLEEP;DIFFICULTY ARISING;DISTURBED/INTERRUPTED SLEEP;NIGHTMARES/TERRORS
AVERAGE NUMBER OF SLEEP HOURS: 8
DO YOU HAVE DIFFICULTY SLEEPING: NO

## 2022-12-02 ASSESSMENT — ENCOUNTER SYMPTOMS
EYE DISCHARGE: 0
VOICE CHANGE: 0
ANAL BLEEDING: 0
COUGH: 0
ABDOMINAL DISTENTION: 0
VOMITING: 0
NAUSEA: 0

## 2022-12-02 ASSESSMENT — PATIENT HEALTH QUESTIONNAIRE - PHQ9: SUM OF ALL RESPONSES TO PHQ QUESTIONS 1-9: 26

## 2022-12-02 ASSESSMENT — LIFESTYLE VARIABLES
HOW OFTEN DO YOU HAVE A DRINK CONTAINING ALCOHOL: 4 OR MORE TIMES A WEEK
HOW MANY STANDARD DRINKS CONTAINING ALCOHOL DO YOU HAVE ON A TYPICAL DAY: 10 OR MORE

## 2022-12-02 NOTE — ED NOTES
Patient resting with eyes closed. Respirations even and unlabored. No distress noted.       Antoinette Farris RN  12/02/22 8710

## 2022-12-02 NOTE — ED NOTES
Message left for Dr. Felicia Laboy to return call for disposition.       Faisal Cuadra RN  12/02/22 4259

## 2022-12-02 NOTE — ED NOTES
Patient resting quietly respirations are even and unlabored no distress noted at this time.       Sola Palacios RN  12/02/22 5940

## 2022-12-02 NOTE — ED NOTES
Call placed to lab and spoke with [facility] regarding ETOH redraw.       Hanny Ratliff RN  12/02/22 8716

## 2022-12-02 NOTE — ED NOTES
Disposition with Dr Nidhi Newman. Plan to admit to [facility] with PRNs and CIWA.      Selma Farias RN  12/02/22 7061
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
## 2022-12-02 NOTE — ED NOTES
Patient changed into psych safe clothes. Patient belongings checked and skin check done.   Called to Lab spoke with Roopa Ken RN  12/02/22 7498

## 2022-12-02 NOTE — ED PROVIDER NOTES
[address] [facility] ED  eMERGENCY dEPARTMENT eNCOUnter      Pt Name: Nehemias Rodriguez  MRN: [id number]  [place] 1980  Date of evaluation: 12/2/2022  Provider: Adeola Hernandez MD    CHIEF COMPLAINT       Chief Complaint   Patient presents with    Suicidal     Pt has been having suicidal ideation with a plan         HISTORY OF PRESENT ILLNESS   (Location/Symptom, Timing/Onset,Context/Setting, Quality, Duration, Modifying Factors, Severity)  Note limiting factors. Nehemias Rodriguez is a 36 y.o. male who presents to the emergency department states he is got a 1 month history of suicidal ideation with a plan to overdose states he has pills at home but has been taking Percocet and morphine but cannot tell us where obtained at he also notes he is not taking his scheduled medications. Denies any cuts bruises fever chills denies any urinary symptoms. Symptoms moderate in severity nothing proves worsen symptoms       HPI    NursingNotes were reviewed. REVIEW OF SYSTEMS    (2-9 systems for level 4, 10 or more for level 5)     Review of Systems   Constitutional:  Negative for activity change, appetite change and unexpected weight change. HENT:  Negative for ear discharge, nosebleeds and voice change. Eyes:  Negative for discharge. Respiratory:  Negative for cough. Cardiovascular:  Negative for chest pain. Gastrointestinal:  Negative for abdominal distention, anal bleeding, nausea and vomiting. Genitourinary:  Negative for dysuria and hematuria. Musculoskeletal:  Negative for joint swelling. Skin:  Negative for pallor. Neurological:  Negative for seizures and facial asymmetry. Hematological:  Does not bruise/bleed easily. Psychiatric/Behavioral:  Positive for suicidal ideas. Negative for behavioral problems and self-injury. All other systems reviewed and are negative. Except as noted above the remainder of the review of systems was reviewed and negative.        PAST MEDICAL HISTORY     Past Medical History:   Diagnosis Date    Depression     Hypertension 2015    trx with pills x 1 month / patient did not follow up    Smoker          SURGICALHISTORY       Past Surgical History:   Procedure Laterality Date    TX CREATE EARDRUM OPENING,GEN ANESTH Bilateral 2/8/2018    BILATERAL MYRINGOTOMY WITH VENTILING TUBE INSERTION (PAT DONE 1/22/18) performed by Neli Carroll MD at [address]       Previous Medications    AMLODIPINE (NORVASC) 10 MG TABLET    Take 1 tablet by mouth daily    DIVALPROEX (DEPAKOTE) 250 MG DR TABLET    Take 1 tablet by mouth every 8 hours    HYDROXYZINE PAMOATE (VISTARIL) 50 MG CAPSULE    TAKE 1 CAPSULE BY MOUTH TWICE DAILY AS NEEDED FOR ANXIETY    PANTOPRAZOLE (PROTONIX) 40 MG TABLET    Take 1 tablet by mouth every morning (before breakfast)    QUETIAPINE (SEROQUEL) 300 MG TABLET    Take 1 tablet by mouth nightly    VALSARTAN (DIOVAN) 320 MG TABLET    Take 1 tablet by mouth daily            Patient has no known allergies.     FAMILY HISTORY       Family History   Problem Relation Age of Onset    High Blood Pressure Mother     Heart Disease Mother     Other Mother         copd / smoker    Stroke Father     High Blood Pressure Brother     Other Brother         anxiety          SOCIAL HISTORY       Social History     Socioeconomic History    Marital status: Single     Spouse name: None    Number of children: None    Years of education: None    Highest education level: None   Tobacco Use    Smoking status: Every Day     Packs/day: 1.00     Years: 20.00     Pack years: 20.00     Types: Cigarettes    Smokeless tobacco: Never   Substance and Sexual Activity    Alcohol use: Not Currently     Comment: 5th of vodka a day    Drug use: Yes     Types: Marijuana (Weed), Opiates      Social Determinants of Health     Financial Resource Strain: Low Risk     Difficulty of Paying Living Expenses: Not hard at all   Food Insecurity: No Food Insecurity Worried About [facility] in the Last Year: Never true    [facility] in the Last Year: Never true       SCREENINGS   [place] Coma Scale  Eye Opening: Spontaneous  Best Verbal Response: Oriented  Best Motor Response: Obeys commands  [place] Coma Scale Score: 15  Ebola Virus Disease (EVD) Screening   Temp: 98.1 °F (36.7 °C)  CIWA Assessment  BP: 137/75  Heart Rate: 85  Nausea and Vomiting: no nausea and no vomiting  Tactile Disturbances: none  Tremor: no tremor  Auditory Disturbances: not present  Paroxysmal Sweats: no sweat visible  Visual Disturbances: not present  Anxiety: mildly anxious  Headache, Fullness in Head: none present  Agitation: normal activity  Orientation and Clouding of Sensorium: oriented and can do serial additions  CIWA-Ar Total: 1    PHYSICAL EXAM    (up to 7 for level 4, 8 or more for level 5)     ED Triage Vitals [12/02/22 0100]   BP Temp Temp Source Heart Rate Resp SpO2 Height Weight   126/75 97.8 °F (36.6 °C) Oral 78 18 99 % 6' 4\" (1.93 m) 250 lb (113.4 kg)       Physical Exam  Vitals and nursing note reviewed. Constitutional:       General: He is not in acute distress. Appearance: He is well-developed. HENT:      Head: Normocephalic and atraumatic. Right Ear: External ear normal.      Left Ear: External ear normal.      Nose: Nose normal.      Mouth/Throat:      Mouth: Mucous membranes are moist.   Eyes:      General:         Right eye: No discharge. Left eye: No discharge. Pupils: Pupils are equal, round, and reactive to light. Cardiovascular:      Rate and Rhythm: Normal rate and regular rhythm. Heart sounds: Normal heart sounds. Pulmonary:      Effort: Pulmonary effort is normal. No respiratory distress. Breath sounds: Normal breath sounds. No stridor. Abdominal:      General: Bowel sounds are normal. There is no distension. Palpations: Abdomen is soft. Musculoskeletal:         General: Normal range of motion.       Cervical back: Normal range of motion and neck supple. Skin:     General: Skin is warm. Findings: No erythema. Neurological:      Mental Status: He is alert and oriented to person, place, and time. Psychiatric:      Comments: Flat affect       RESULTS     EKG: All EKG's are interpreted by the Emergency Department Physician who either signs or Co-signsthis chart in the absence of a cardiologist.         RADIOLOGY:   Laura Sable such as CT, Ultrasound and MRI are read by the radiologist. Plain radiographic images are visualized and preliminarily interpreted by the emergency physician with the below findings:         Interpretation per the Radiologist below, if available at the time ofthis note:    No orders to display         ED BEDSIDE ULTRASOUND:   Performed by ED Physician - none    LABS:  Labs Reviewed   ACETAMINOPHEN LEVEL - Abnormal; Notable for the following components:       Result Value    Acetaminophen Level <5 (*)     All other components within normal limits   CBC WITH AUTO DIFFERENTIAL - Abnormal; Notable for the following components:    WBC 12.3 (*)     MCV 94.8 (*)     MCH 31.8 (*)     Neutrophils Absolute 7.1 (*)     All other components within normal limits   CK - Abnormal; Notable for the following components:     Total  (*)     All other components within normal limits   COMPREHENSIVE METABOLIC PANEL - Abnormal; Notable for the following components:    Albumin 4.8 (*)     All other components within normal limits   SALICYLATE LEVEL - Abnormal; Notable for the following components:    Salicylate, Serum <6.3 (*)     All other components within normal limits   LIPID PANEL - Abnormal; Notable for the following components:    Cholesterol, Total 226 (*)     Triglycerides 247 (*)     HDL 28 (*)     LDL Calculated 149 (*)     All other components within normal limits   VALPROIC ACID LEVEL, TOTAL - Abnormal; Notable for the following components:    Valproic Acid Lvl 13.5 (*)     All other components within normal limits   COVID-19, RAPID   ETHANOL   URINALYSIS WITH REFLEX TO CULTURE   URINE DRUG SCREEN   TSH   ETHANOL       All other labs were within normal range or not returned as of this dictation. EMERGENCY DEPARTMENT COURSE and DIFFERENTIAL DIAGNOSIS/MDM:   Vitals:    Vitals:    12/02/22 0100 12/02/22 0724 12/02/22 1105   BP: 126/75 (!) 104/55 137/75   Pulse: 78 64 85   Resp: 18 16 16   Temp: 97.8 °F (36.6 °C) 98.1 °F (36.7 °C)    TempSrc: Oral Oral    SpO2: 99% 98% 99%   Weight: 250 lb (113.4 kg)     Height: 6' 4\" (1.93 m)              MDM  Number of Diagnoses or Management Options  Bipolar depression ([facility]  Diagnosis management comments: Will sign out to Cely Phelan for medical clearance and appropriate disposition. Medically cleared. Plan to admit to 3w. Amount and/or Complexity of Data Reviewed  Clinical lab tests: ordered        CRITICAL CARE TIME       CONSULTS:  IP CONSULT TO HOSPITALIST  IP CONSULT TO SOCIAL WORK    PROCEDURES:  Unless otherwise noted below, none     Procedures    FINAL IMPRESSION      1. Bipolar depression ([facility]          DISPOSITION/PLAN   DISPOSITION Decision To Admit 12/02/2022 11:13:16 AM      PATIENT REFERRED TO:  No follow-up provider specified.     DISCHARGE MEDICATIONS:  New Prescriptions    No medications on file          (Please note that portions of this note were completed with a voice recognition program.  Efforts were made to edit the dictations but occasionally words are mis-transcribed.)    Razia Ren MD (electronically signed)  Attending Emergency Physician        Razia Ren MD  12/02/22 4223

## 2022-12-02 NOTE — ED NOTES
Patient appears to be  sleeping respirations are even and unlabored no distress noted at this time.      Leilani Llanos RN  12/02/22 8819

## 2022-12-03 PROCEDURE — 6370000000 HC RX 637 (ALT 250 FOR IP): Performed by: PSYCHIATRY & NEUROLOGY

## 2022-12-03 PROCEDURE — 1240000000 HC EMOTIONAL WELLNESS R&B

## 2022-12-03 PROCEDURE — 99223 1ST HOSP IP/OBS HIGH 75: CPT | Performed by: PSYCHIATRY & NEUROLOGY

## 2022-12-03 RX ORDER — VALSARTAN 160 MG/1
320 TABLET ORAL DAILY
Status: DISCONTINUED | OUTPATIENT
Start: 2022-12-03 | End: 2022-12-06 | Stop reason: HOSPADM

## 2022-12-03 RX ORDER — AMLODIPINE BESYLATE 10 MG/1
10 TABLET ORAL DAILY
Status: DISCONTINUED | OUTPATIENT
Start: 2022-12-03 | End: 2022-12-06 | Stop reason: HOSPADM

## 2022-12-03 RX ORDER — OXCARBAZEPINE 150 MG/1
150 TABLET, FILM COATED ORAL 2 TIMES DAILY
Status: DISCONTINUED | OUTPATIENT
Start: 2022-12-03 | End: 2022-12-04

## 2022-12-03 RX ADMIN — OXCARBAZEPINE 150 MG: 150 TABLET, FILM COATED ORAL at 20:57

## 2022-12-03 RX ADMIN — OXCARBAZEPINE 150 MG: 150 TABLET, FILM COATED ORAL at 09:50

## 2022-12-03 RX ADMIN — Medication 100 MG: at 09:41

## 2022-12-03 RX ADMIN — TRAZODONE HYDROCHLORIDE 50 MG: 50 TABLET ORAL at 20:57

## 2022-12-03 RX ADMIN — THERA TABS 1 TABLET: TAB at 09:40

## 2022-12-03 RX ADMIN — HYDROXYZINE PAMOATE 50 MG: 50 CAPSULE ORAL at 18:40

## 2022-12-03 RX ADMIN — LURASIDONE HYDROCHLORIDE 20 MG: 20 TABLET, FILM COATED ORAL at 09:50

## 2022-12-03 RX ADMIN — FOLIC ACID 1 MG: 1 TABLET ORAL at 09:41

## 2022-12-03 RX ADMIN — ACETAMINOPHEN 650 MG: 325 TABLET ORAL at 12:39

## 2022-12-03 RX ADMIN — AMLODIPINE BESYLATE 10 MG: 10 TABLET ORAL at 09:50

## 2022-12-03 RX ADMIN — HYDROXYZINE PAMOATE 50 MG: 50 CAPSULE ORAL at 09:41

## 2022-12-03 RX ADMIN — VALSARTAN 320 MG: 160 TABLET, FILM COATED ORAL at 12:39

## 2022-12-03 ASSESSMENT — PAIN DESCRIPTION - LOCATION: LOCATION: HEAD

## 2022-12-03 ASSESSMENT — PAIN DESCRIPTION - DESCRIPTORS: DESCRIPTORS: ACHING

## 2022-12-03 ASSESSMENT — PAIN SCALES - GENERAL: PAINLEVEL_OUTOF10: 0

## 2022-12-03 NOTE — PROGRESS NOTES
Pt. refused to attend the 1000 skills group, despite staff encouragement.   Electronically signed by Leslye Marinelli on 12/3/2022 at 11:10 AM

## 2022-12-03 NOTE — PROGRESS NOTES
Assumed care at [address].  Per report pt arrived on unit from [facility] at [address]

## 2022-12-03 NOTE — PROGRESS NOTES
Behavioral Services  Medicare Certification Upon Admission    I certify that this patient's inpatient psychiatric hospital admission is medically necessary for:    [x] (1) Treatment which could reasonably be expected to improve this patient's condition,       [x] (2) Or for diagnostic study;     AND     [x](2) The inpatient psychiatric services are provided while the individual is under the care of a physician and are included in the individualized plan of care.     Estimated length of stay/service 3-5    Plan for post-hospital care OP care    Electronically signed by Jose Lucia MD on 12/3/2022 at 9:24 AM

## 2022-12-03 NOTE — CONSULTS
[facility]  MEDICINE    HISTORY AND PHYSICAL EXAM    PATIENT NAME:  Mara Ruvalcaba    MRN:  [id number]  SERVICE DATE:  12/3/2022   SERVICE TIME:  12:48 PM    Primary Care Physician: Cleo Suarez MD         SUBJECTIVE  CHIEF COMPLAINT:  Medically appropriate for inpatient psychiatry admission. Consult for medical H/P encounter. HPI:  This is a 36 y.o. male who presents with PMHx depression, alcohol abuse and HTN  presented to emergency room suicide thoughts with plan to overdose. UDS is negative. , WBC 12.3, lipid panel elevated. Hemodynamically stable and afebrile. Patient medically cleared from emergency room for psychiatric care. Patient Seen, Chart, Labs, Radiologystudies, and Consults reviewed. Patient denies headache, chest pain, shortness of breath, N/V/D/C, fever/chills.        PAST MEDICAL HISTORY:    Past Medical History:   Diagnosis Date    Depression     Hypertension 2015    trx with pills x 1 month / patient did not follow up    Smoker      PAST SURGICAL HISTORY:    Past Surgical History:   Procedure Laterality Date    IA CREATE EARDRUM OPENING,GEN ANESTH Bilateral 2/8/2018    BILATERAL MYRINGOTOMY WITH VENTILING TUBE INSERTION (PAT DONE 1/22/18) performed by Christel Vance MD at [address]:    Family History   Problem Relation Age of Onset    High Blood Pressure Mother     Heart Disease Mother     Other Mother         copd / smoker    Stroke Father     High Blood Pressure Brother     Other Brother         anxiety     SOCIAL HISTORY:    Social History     Socioeconomic History    Marital status: Single     Spouse name: Not on file    Number of children: Not on file    Years of education: Not on file    Highest education level: Not on file   Occupational History    Not on file   Tobacco Use    Smoking status: Every Day     Packs/day: 1.00     Years: 20.00     Pack years: 20.00     Types: Cigarettes    Smokeless tobacco: Never Substance and Sexual Activity    Alcohol use: Not Currently     Comment: 5th of vodka a day    Drug use: Yes     Types: Marijuana (Sindi Padilla), Opiates     Sexual activity: Not on file   Other Topics Concern    Not on file   Social History Narrative    Not on file     Social Determinants of Health     Financial Resource Strain: Low Risk     Difficulty of Paying Living Expenses: Not hard at all   Food Insecurity: No Food Insecurity    Worried About Running Out of Food in the Last Year: Never true    Ran Out of Food in the Last Year: Never true   Transportation Needs: Not on file   Physical Activity: Not on file   Stress: Not on file   Social Connections: Not on file   Intimate Partner Violence: Not on file   Housing Stability: Not on file     MEDICATIONS:    Current Facility-Administered Medications   Medication Dose Route Frequency Provider Last Rate Last Admin    amLODIPine (NORVASC) tablet 10 mg  10 mg Oral Daily Carol Blanco MD   10 mg at 12/03/22 0950    valsartan (DIOVAN) tablet 320 mg  320 mg Oral Daily Carol Blanco MD   320 mg at 12/03/22 1239    lurasidone (LATUDA) tablet 20 mg  20 mg Oral Daily Carol Blanco MD   20 mg at 12/03/22 0950    OXcarbazepine (TRILEPTAL) tablet 150 mg  150 mg Oral BID Carol Blanco MD   150 mg at 12/03/22 0950    acetaminophen (TYLENOL) tablet 650 mg  650 mg Oral Q4H PRN Carol Blanco MD   650 mg at 12/03/22 1239    magnesium hydroxide (MILK OF MAGNESIA) 400 MG/5ML suspension 30 mL  30 mL Oral Daily PRN Carol Blanco MD        aluminum & magnesium hydroxide-simethicone (MAALOX) 200-200-20 MG/5ML suspension 30 mL  30 mL Oral PRN Carol Blanco MD        haloperidol (HALDOL) tablet 5 mg  5 mg Oral Q6H PRN Carol Blanco MD        Or    haloperidol lactate (HALDOL) injection 5 mg  5 mg IntraMUSCular Q6H PRN Carol Blanco MD        benztropine mesylate (COGENTIN) injection 2 mg  2 mg IntraMUSCular BID PRN Carol Blanco MD        hydrOXYzine pamoate (VISTARIL) capsule 50 mg  50 mg Oral Q6H PRN Chang Zepeda MD   50 mg at 12/03/22 0941    Or    hydrOXYzine (VISTARIL) injection 50 mg  50 mg IntraMUSCular Q6H PRN Chang Zepeda MD        traZODone (DESYREL) tablet 50 mg  50 mg Oral Nightly PRN Chang Zepeda MD   50 mg at 36/07/89 8467    folic acid (FOLVITE) tablet 1 mg  1 mg Oral Daily Chang Zepeda MD   1 mg at 12/03/22 0941    multivitamin 1 tablet  1 tablet Oral Daily Chang Zepeda MD   1 tablet at 12/03/22 0940    chlordiazePOXIDE (LIBRIUM) capsule 10 mg  10 mg Oral Q4H PRN Chang Zepeda MD        Or    chlordiazePOXIDE (LIBRIUM) capsule 25 mg  25 mg Oral Q4H PRN Chang Zepeda MD   25 mg at 12/02/22 2054    Or    chlordiazePOXIDE (LIBRIUM) capsule 50 mg  50 mg Oral Q2H PRN Chang Zepeda MD        Or    chlordiazePOXIDE (LIBRIUM) capsule 75 mg  75 mg Oral Q1H PRN Chang Zepeda MD        Or    LORazepam (ATIVAN) injection 4 mg  4 mg IntraMUSCular Q1H PRN Chang Zepeda MD        thiamine tablet 100 mg  100 mg Oral Daily Chang Zepeda MD   100 mg at 12/03/22 0941       ALLERGIES: Patient has no known allergies. REVIEW OF SYSTEM:   ROS as noted in HPI, 12 point ROS reviewed and otherwise negative.     OBJECTIVE  PHYSICAL EXAM: /88   Pulse 69   Temp 99 °F (37.2 °C)   Resp 18   Ht 6' 4\" (1.93 m)   Wt 250 lb (113.4 kg)   SpO2 97%   BMI 30.43 kg/m²    Vitals:    12/02/22 1300 12/02/22 1339 12/02/22 2029 12/03/22 0950   BP: 128/78 134/80 (!) 145/83 137/88   Pulse: 76 80 64 69   Resp: 18      Temp: 99 °F (37.2 °C) 99.5 °F (37.5 °C) 99 °F (37.2 °C)    TempSrc: Oral      SpO2: 98% 97% 97%    Weight:       Height:           CONSTITUTIONAL:  awake, alert, cooperative, no apparent distress, and appears stated age  EYES:  Lids and lashes normal, conjunctiva normal  ENT:  Normocephalic, without obvious abnormality, atraumatic, sinuses nontender on palpation, external ears without lesions, oral pharynx with moist mucus membranes, tonsils without erythema or exudates, gums normal and good dentition. NECK:  Supple, symmetrical, trachea midline  LUNGS: Clear to auscultation bilaterally, no crackles or wheezing  CARDIOVASCULAR:  Regular rate and rhythm, normal S1 and S2  ABDOMEN: Normal bowel sounds, soft, non-distended, non-tender  MUSCULOSKELETAL:  There is no redness, warmth, or swelling of the joints. NEUROLOGIC:  Awake, alert, oriented to name, place and time. SKIN:  Warm and dry  DATA:     Diagnostic tests reviewed for today's visit:    Most recent labs and imaging results reviewed. ASSESSMENT AND PLAN    36 y.o. male with PMH of depression, alcohol abuse and HTN  presented with: Major depression, recurrent [facility])  Patient admitted to behavorial health for evaluation and treatment     HTN: Will resume home regimen. Recommend f/u with PCP    HLD: diet discussed with patient. Statin recommended; however, CK elevated. F/U with PCP    This is only a history and physical examination and not medical management. The patient is to contact and follow up with their primary care physician and go over any abnormal labs, imaging, findings, medical concerns, or conditions that we have and have not addressed during this encounter. Plan of care discussed with: patient    SIGNATURE: ERNESTINE Stephenson - CNP  DATE: December 3, 2022  TIME: 12:48 PM     I personally obtained the key and critical portions of the history and physical exam and made additions where appropriate in the documentation.  I reviewed the mid level documentation and agree with assessment and plan that we come up with together    Gudelia Aguilar, DO  Internal Medicine

## 2022-12-03 NOTE — GROUP NOTE
Group Therapy Note    Date: 12/3/2022    Group Start Time: 1500  Group End Time: 2626  Group Topic: Healthy Living/Wellness    [facility]    Franco Alvarado RN        Group Therapy Note    Attendees: 10/14       Patient's Goal:  forgive    Notes:      Status After Intervention:  Improved    Participation Level:  Active Listener    Participation Quality: Appropriate      Speech:  normal      Thought Process/Content: Logical      Affective Functioning: Congruent      Mood: euthymic      Level of consciousness:  Alert      Response to Learning: Progressing to goal      Endings: None Reported    Modes of Intervention: Education      Discipline Responsible: Registered Nurse      Signature:  Franco Alvarado RN Patient given Rx for glasses.

## 2022-12-03 NOTE — GROUP NOTE
Group Therapy Note    Date: 12/3/2022    Group Start Time: 1100  Group End Time: 1150  Group Topic: Psychoeducation    [facility]    KESHAV Duncan LSW        Group Therapy Note    Attendees: 9       Patient's Goal:  to participate in a psychoeducational group    Notes:  patient was an active listener during group. Status After Intervention:  Unchanged    Participation Level:  Active Listener    Participation Quality: Appropriate      Speech:  normal      Thought Process/Content: Logical      Affective Functioning: Flat      Mood: depressed      Level of consciousness:  Alert      Response to Learning: Able to verbalize current knowledge/experience      Endings: None Reported    Modes of Intervention: Education      Discipline Responsible: /Counselor      Signature:  KESHAV Duncan LSW

## 2022-12-03 NOTE — CARE COORDINATION
Psychosocial Assessment    Current Level of Psychosocial Functioning     Independent   Dependent  X  Minimal Assist     Comments:  Bipolar depression    Psychosocial High Risk Factors (check all that apply)    Unable to obtain meds   Chronic illness/pain    Substance abuse X  Lack of Family Support   Financial stress X  Isolation   Inadequate Community Resources X  Suicide attempt(s) X  Not taking medications X  Victim of crime   Developmental Delay  Unable to manage personal needs    Age 72 or older   Homeless X  No transportation X  Readmission within 30 days  Unemployment X  Traumatic Event    Patient meets at least 8 high risk factors    Family/Supports identified:   Brother is supportive  Sexual Orientation:    Did not disclose  Patient Strengths:  Support from brother, clear and organized to make decisions  Patient Barriers:   No MH provider, not compliant with medication, unemployed, homeless  Safety plan:  Completed   [facility]/MH history:  Previous admissions. Last admit to  was 5/2022  Plan of Care:  medication management, group/individual therapies, family meetings, psycho -education, treatment team meetings to assist with stabilization    Initial Discharge Plan:    Patient would like to discharge to his brother's house. Clinical Summary:     Per notes, Patient is a 43yo with a history of biploar depression and substance abuse who reported to the ED for suicidal thoughts with a plan to overdose on pills. Patient has a long term struggle with addiction. He has been in treatment at twice a [facility] for detox, [facility] and other treatment facilities. He has been unable to maintain sobriety for any length of time. 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 He lost his 24year old daughter in July from a drug overdose. He is currently homeless.   Patient presents depressed, very talkative during the [facility] assessment. Reports drinking a fifth of vodka a day and recently taking a variety of pills - Percocet, Morphine and Xanax. Denies SI at this time. He is inconsistent with Bipolar medications because they make him \"too tired for work and they don't really work anyway\" He is open to treatment. Would like to talk to his brother and come up with a discharge and treatment plan.  will assist with discharge per doctor's orders.

## 2022-12-03 NOTE — PROGRESS NOTES
Pt. presents with low monotone of voice. Depressed affect. Tearful at times. Reports feeling suicidal for quite awhile and depressed. Had a plan to take pills to OD. Reports 24 yr old daughter  in July and he cannot deal with her death. Denies hi. Feels paranoid at times like people are watching him. Reports drinking ETOH daily but denies smoking marijuana or using any other drugs. Has had past aborted treatments at [place] [address]. Has supportive friends and recently started talking to his brother again. Poor concentration and poor memory. Fair motivation. Has no transportation and hasnt held a steady job in a long time. Has been on 3WT before and was non compliant with the meds, \"stating they didn't work. \" Stressors include  1.trying to get somewhere in life  2.no car or transportation 3.not having a job  **working on cars, fishing  Electronically signed by Judah Monroe on 12/3/2022 at 9:01 AM

## 2022-12-03 NOTE — PROGRESS NOTES
Bp obtained, reports a sinus h/a gave tylenol for #3 pain. Gave newly ordered diovan, 135/78, no tremors or sweats noted.

## 2022-12-03 NOTE — PLAN OF CARE
Pt is out on the unit but isolative to self. Pt states his anxiety and depression are 10/10. States he is dealing with a lot of stress in his life. Pt is behind in child support and does not have a license at this time. States he is trying to find employment but has trouble d/t transportation and a felony from 20 years ago. Pt is motivated for the future and states \"I just wanted to get my mind right. \" Pt states he was taking his medication after he was discharged in May but started experiencing throat tightness and stopped taking them. Pt talked about the death of his daughter this past July and became tearful. Pt encouraged to seek grief counseling. Pt states \"I just bottle things up. \" Pt states he will be living with his brother when he discharges. Denies SI, HI, AVH. Problem: Anxiety  Goal: Will report anxiety at manageable levels  Description: INTERVENTIONS:  1. Administer medication as ordered  2. Teach and rehearse alternative coping skills  3. Provide emotional support with 1:1 interaction with staff  Recent Flowsheet Documentation  Taken 12/3/2022 1028 by Twin Lane RN  Will report anxiety at manageable levels:   Administer medication as ordered   Provide emotional support with 1:1 interaction with staff   Teach and rehearse alternative coping skills  12/3/2022 0758 by Twin Lane RN  Outcome: Progressing     Problem: Coping  Goal: Pt/Family able to verbalize concerns and demonstrate effective coping strategies  Description: INTERVENTIONS:  1. Assist patient/family to identify coping skills, available support systems and cultural and spiritual values  2. Provide emotional support, including active listening and acknowledgement of concerns of patient and caregivers  3. Reduce environmental stimuli, as able  4. Instruct patient/family in relaxation techniques, as appropriate  5.  Assess for spiritual pain/suffering and initiate Spiritual Care, Psychosocial Clinical Specialist consults as needed  Recent Flowsheet Documentation  Taken 12/3/2022 1028 by Rios Dennison RN  Patient/family able to verbalize anxieties, fears, and concerns, and demonstrate effective coping:   Assist patient/family to identify coping skills, available support systems and cultural and spiritual values   Provide emotional support, including active listening and acknowledgement of concerns of patient and caregivers   Reduce environmental stimuli, as able   Instruct patient/family in relaxation techniques, as appropriate   Assess for spiritual pain/suffering and initiate Spiritual Care, Psychosocial Clinical Specialist consults as needed  12/3/2022 0758 by Rios Dennison RN  Outcome: Progressing     Problem: Behavior  Goal: Pt/Family maintain appropriate behavior and adhere to behavioral management agreement, if implemented  Description: INTERVENTIONS:  1. Assess patient/family's coping skills and  non-compliant behavior (including use of illegal substances)  2. Notify security of behavior or suspected illegal substances which indicate the need for search of the family and/or belongings  3. Encourage verbalization of thoughts and concerns in a socially appropriate manner  4. Utilize positive, consistent limit setting strategies supporting safety of patient, staff and others  5. Encourage participation in the decision making process about the behavioral management agreement  6. If a visitor's behavior poses a threat to safety call refer to organization policy.   7. Initiate consult with , Psychosocial CNS, Spiritual Care as appropriate  Recent Flowsheet Documentation  Taken 12/3/2022 1028 by Rios Dennison RN  Patient/family maintains appropriate behavior and adheres to behavioral management agreement, if implemented:   Assess patient/familys coping skills and  non-compliant behavior (including use of illegal substances)   Notify security of behavior or suspected illegal substances which indicate the need for search of the patient and/or belongings   Encourage verbalization of thoughts and concerns in a socially appropriate manner   Encourage participation in the decision making process about the behavioral management agreement   Utilize positive, consistent limit setting strategies supporting safety of patient, staff and others   Implement a [place] Agreement if patient meets criteria   If a patients behavior jeopardizes the safety of the patient, staff, or others refer to organization policy. If a visitors behavior poses a threat to safety refer to organization policy   Initiate consult with , Psychosocial Clinical Nurse Specialist, Spiritual Care as appropriate  12/3/2022 0758 by Luiz Ling RN  Outcome: Progressing     Problem: Depression/Self Harm  Goal: Effect of psychiatric condition will be minimized and patient will be protected from self harm  Description: INTERVENTIONS:  1. Assess impact of patient's symptoms on level of functioning, self care needs and offer support as indicated  2. Assess patient/family knowledge of depression, impact on illness and need for teaching  3. Provide emotional support, presence and reassurance  4. Assess for possible suicidal thoughts or ideation. If patient expresses suicidal thoughts or statements do not leave alone, initiate Suicide Precautions, move to a room close to the nursing station and obtain sitter  5.  Initiate consults as appropriate with Mental Health Professional, Spiritual Care, Psychosocial CNS, and consider a recommendation to the [facility] for a Psychiatric Consultation  Recent Flowsheet Documentation  Taken 12/3/2022 1033 by Luiz Ling RN  Effect of psychiatric condition will be minimized and patient will be protected from self harm:   Assess impact of patients symptoms on level of functioning, self care needs and offer support as indicated   Provide emotional support, presence and reassurance   Assess patient/family knowledge of depression, impact on illness and need for teaching   Assess for suicidal thoughts or ideation. If patient expresses suicidal thoughts or statements do not leave alone, initiate Suicide Precautions, move near nurse station, obtain sitter   Initiate consults as appropriate with Mental Health Professional, Spiritual Care, Psychosocial CNS, and consider a recommendation to the [facility] for a Psychiatric Consultation  Taken 12/3/2022 1028 by Apple Lazcano RN  Effect of psychiatric condition will be minimized and patient will be protected from self harm:   Assess impact of patients symptoms on level of functioning, self care needs and offer support as indicated   Assess patient/family knowledge of depression, impact on illness and need for teaching   Provide emotional support, presence and reassurance   Assess for suicidal thoughts or ideation. If patient expresses suicidal thoughts or statements do not leave alone, initiate Suicide Precautions, move near nurse station, obtain sitter   Initiate consults as appropriate with Mental Health Professional, Spiritual Care, Psychosocial CNS, and consider a recommendation to the [facility] for a Psychiatric Consultation  12/3/2022 0758 by Apple Lazcano RN  Outcome: Progressing     Problem: Safety - Adult  Goal: Free from fall injury  Outcome: Progressing     Problem: Drug Abuse/Detox  Goal: Will have no detox symptoms and will verbalize plan for changing drug-related behavior  Description: INTERVENTIONS:  1. Administer medication as ordered  2. Monitor physical status  3. Provide emotional support with 1:1 interaction with staff  4.  Encourage  recovery focused treatment   Recent Flowsheet Documentation  Taken 12/3/2022 1028 by Apple Lazcano RN  Will have no detox symptoms and will verbalize plan for changing drug-related behavior:   Administer medication as ordered   Provide emotional support with 1:1 interaction with staff   Monitor physical status   Encourage recovery focused treatment  12/3/2022 0758 by Apple Lazcano RN  Outcome: Progressing

## 2022-12-03 NOTE — CARE COORDINATION
Brief Intervention and Referral to Treatment Summary    Patient was provided PHQ-9, AUDIT-C and DAST Screening:      PHQ-9 Score: 26  AUDIT-C Score:  12  DAST Score:  10    ETOH 0.166    Patients substance use is considered     Low Risk/Healthy   Moderate Risk  Harmful  Dependent X    Patients depression is considered:     Minimal  Mild   Moderate  Moderately Severe  Severe X    Brief Education Was Provided    Patient was receptive X  Patient was not receptive      Brief Intervention Is Provided (Only for AUDIT-C or DAST)     Patient reports readiness to decrease and/or stop use and a plan was discussed X  Patient denies readiness to decrease and/or stop use and a plan was not discussed      Recommendations/Referrals for Brief and/or Specialized Treatment Provided to Patient    Patient has been in treatment twice at [facility] detox, [address] [place] [address] and other treatment facilities. He wants to talk to his brother and come up with a discharge and treatment plan. He stated he will contact [facility] on his own. Provided patient with [facility] pamphlet. He is homeless and may discharge to his brother's house.

## 2022-12-03 NOTE — GROUP NOTE
Group Therapy Note    Date: 12/2/2022    Group Start Time: 2230  Group End Time: 2245  Group Topic: Wrap-Up    [id number]    Lawanda Boothe RN        Group Therapy Note    Attendees: 10/13       Patient's Goal:  To feel better    Notes:  Progressing    Status After Intervention:  Unchanged    Participation Level:  Active Listener    Participation Quality: Appropriate      Speech:  normal      Thought Process/Content: Logical      Affective Functioning: Congruent      Mood: euthymic      Level of consciousness:  Alert      Response to Learning: Progressing to goal      Endings: None Reported    Modes of Intervention: Support      Discipline Responsible: Registered Nurse      Signature:  Laawnda Boothe RN

## 2022-12-03 NOTE — PLAN OF CARE
Problem: Anxiety  Goal: Will report anxiety at manageable levels  Description: INTERVENTIONS:  1. Administer medication as ordered  2. Teach and rehearse alternative coping skills  3. Provide emotional support with 1:1 interaction with staff  12/2/2022 2132 by Aleksander Tovar RN  Outcome: Progressing  12/2/2022 1759 by Cristy Mccray RN  Outcome: Progressing     Problem: Coping  Goal: Pt/Family able to verbalize concerns and demonstrate effective coping strategies  Description: INTERVENTIONS:  1. Assist patient/family to identify coping skills, available support systems and cultural and spiritual values  2. Provide emotional support, including active listening and acknowledgement of concerns of patient and caregivers  3. Reduce environmental stimuli, as able  4. Instruct patient/family in relaxation techniques, as appropriate  5. Assess for spiritual pain/suffering and initiate Spiritual Care, Psychosocial Clinical Specialist consults as needed  12/2/2022 2132 by Aleksander Tovar RN  Outcome: Progressing  12/2/2022 1759 by Cristy Mccray RN  Outcome: Progressing     Problem: Behavior  Goal: Pt/Family maintain appropriate behavior and adhere to behavioral management agreement, if implemented  Description: INTERVENTIONS:  1. Assess patient/family's coping skills and  non-compliant behavior (including use of illegal substances)  2. Notify security of behavior or suspected illegal substances which indicate the need for search of the family and/or belongings  3. Encourage verbalization of thoughts and concerns in a socially appropriate manner  4. Utilize positive, consistent limit setting strategies supporting safety of patient, staff and others  5. Encourage participation in the decision making process about the behavioral management agreement  6. If a visitor's behavior poses a threat to safety call refer to organization policy.   7. Initiate consult with , Psychosocial CNS, Spiritual Care as appropriate  12/2/2022 2132 by Olivia Waite RN  Outcome: Progressing  12/2/2022 1759 by Bettye Green RN  Outcome: Progressing     Problem: Depression/Self Harm  Goal: Effect of psychiatric condition will be minimized and patient will be protected from self harm  Description: INTERVENTIONS:  1. Assess impact of patient's symptoms on level of functioning, self care needs and offer support as indicated  2. Assess patient/family knowledge of depression, impact on illness and need for teaching  3. Provide emotional support, presence and reassurance  4. Assess for possible suicidal thoughts or ideation. If patient expresses suicidal thoughts or statements do not leave alone, initiate Suicide Precautions, move to a room close to the nursing station and obtain sitter  5. Initiate consults as appropriate with Mental Health Professional, Spiritual Care, Psychosocial CNS, and consider a recommendation to the LIP for a Psychiatric Consultation  12/2/2022 2132 by Olivia Waite RN  Outcome: Progressing  12/2/2022 1759 by Bettye Green RN  Outcome: Progressing     Problem: Safety - Adult  Goal: Free from fall injury  12/2/2022 2132 by Olivia Waite RN  Outcome: Progressing  12/2/2022 1759 by Bettye Green RN  Outcome: Progressing     Problem: Drug Abuse/Detox  Goal: Will have no detox symptoms and will verbalize plan for changing drug-related behavior  Description: INTERVENTIONS:  1. Administer medication as ordered  2. Monitor physical status  3. Provide emotional support with 1:1 interaction with staff  4. Encourage  recovery focused treatment   Outcome: Progressing   Pt slept beginning of the shift. Isolative to room. Reports ate dinner. Pt c/o feeling ETOH W/D SX. Remains on ciwa p. Medicated with librium 25mg po at 2054 for score of 9. Pt denies current SI,HI,A/V hallucinations. Contracts for safety on the unit. Reports had x2 diarrhea stool earlier. Informed pt to save next stool and alert staff.  Pt is alert and oriented to person,place time and situation. Gait steady on ambulation.

## 2022-12-03 NOTE — PLAN OF CARE
Patient arrived to unit via wheelchair from [facility] AN AFFILIATE OF [facility]. Admitted due to an overdose on percocet, xanax and morphine. Brought to the ER by his brother after he told him he had taken the pills. Patient verbalizes that his depression and suicidal thoughts have gotten worse since his daughter  this past July. Patient verbalized that he had one previous suicide attempt by taking too much xanax in 2016. Patient rates his anxiety 7/10 and depression 10/10. Patient states that he is currently having suicidal thoughts but isn't going to act on them. He denies HI, AVH. Patient verbalized that he is currently employed by doing \"small jobs. \" Patient was cooperative during assessment. Patient reports good appetite and sleep. Problem: Anxiety  Goal: Will report anxiety at manageable levels  Description: INTERVENTIONS:  1. Administer medication as ordered  2. Teach and rehearse alternative coping skills  3. Provide emotional support with 1:1 interaction with staff  Outcome: Progressing     Problem: Coping  Goal: Pt/Family able to verbalize concerns and demonstrate effective coping strategies  Description: INTERVENTIONS:  1. Assist patient/family to identify coping skills, available support systems and cultural and spiritual values  2. Provide emotional support, including active listening and acknowledgement of concerns of patient and caregivers  3. Reduce environmental stimuli, as able  4. Instruct patient/family in relaxation techniques, as appropriate  5. Assess for spiritual pain/suffering and initiate Spiritual Care, Psychosocial Clinical Specialist consults as needed  Outcome: Progressing     Problem: Behavior  Goal: Pt/Family maintain appropriate behavior and adhere to behavioral management agreement, if implemented  Description: INTERVENTIONS:  1. Assess patient/family's coping skills and  non-compliant behavior (including use of illegal substances)  2.  Notify security of behavior or suspected illegal substances which indicate the need for search of the family and/or belongings  3. Encourage verbalization of thoughts and concerns in a socially appropriate manner  4. Utilize positive, consistent limit setting strategies supporting safety of patient, staff and others  5. Encourage participation in the decision making process about the behavioral management agreement  6. If a visitor's behavior poses a threat to safety call refer to organization policy. 7. Initiate consult with , Psychosocial CNS, Spiritual Care as appropriate  Outcome: Progressing     Problem: Depression/Self Harm  Goal: Effect of psychiatric condition will be minimized and patient will be protected from self harm  Description: INTERVENTIONS:  1. Assess impact of patient's symptoms on level of functioning, self care needs and offer support as indicated  2. Assess patient/family knowledge of depression, impact on illness and need for teaching  3. Provide emotional support, presence and reassurance  4. Assess for possible suicidal thoughts or ideation. If patient expresses suicidal thoughts or statements do not leave alone, initiate Suicide Precautions, move to a room close to the nursing station and obtain sitter  5.  Initiate consults as appropriate with Mental Health Professional, Spiritual Care, Psychosocial CNS, and consider a recommendation to the LIP for a Psychiatric Consultation  Outcome: Progressing     Problem: Safety - Adult  Goal: Free from fall injury  Outcome: Progressing

## 2022-12-03 NOTE — GROUP NOTE
Group Therapy Note    Date: 12/2/2022    Group Start Time: 2000  Group End Time: 2030  Group Topic: Healthy Living/Wellness    [facility]    Vargas Casas RN        Group Therapy Note    Attendees: 13/13       Patient's Goal:  Attend group    Notes:  Good participation    Status After Intervention:  Unchanged    Participation Level:  Active Listener    Participation Quality: Appropriate      Speech:  normal      Thought Process/Content: Logical      Affective Functioning: Congruent      Mood: euthymic      Level of consciousness:  Alert      Response to Learning: Progressing to goal      Endings: None Reported    Modes of Intervention: Support      Discipline Responsible: Registered Nurse      Signature:  Vargas Casas RN

## 2022-12-03 NOTE — H&P
[address] [place] [address] - Department of Psychiatry    History and Physical - Adult         CHIEF COMPLAINT:  Depression HI, SI    History obtained from:  patient    Patient was seen after discussing with the treatment team and reviewing the chart        CIRCUMSTANCES OF ADMISSION:   Patient is a 43yo with a history of biploar depression and substance abuse reported to the ED for suicidal thoughts with a plan to overdose on pills. Patient grew up with his parents (both ) and brother. Graduated High School in [place], attended some trade school at [employer]. Never , three children (14yo,  24 yo and daughter  at 19yo). Patient has a long term struggle with addiction. He has been in treatment at [facility] a [facility] for detox, [facility] and other treatment facilities. He has been unable to maintain sobriety for any length of time. 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 Patient reports depression and bipolar. He lost his 24year old daughter in July from a drug overdose and \"I can't get my head straight ever since\"  Does not have stable housing \" I'm mayur homeless, I guess\"     Patient is a 43yo with a history of biploar depression and substance abuse reported to the ED for suicidal thoughts with a plan to overdose on pills. Was intoxicated on arrival. Once sober, patient continues to have suicidal thoughts with a plan to overdose. He presents depressed, quiet, clear and cooperative. Reports feeling anxious. Denies HI, A/V hallucinations. Feels \" odd and foggy\" this morning. Reports drinking a fifth of vodka a day and recently taking a variety of pills - Percocet, Morphine and Xanax.  Does not use pills daily, \"these just came available to me, so I have been taking them\"        Reports multiple stressors - limited employment, substance abuse which has been exacerbated by grief from losing his 23 yo daughter to an overdose in July. Patient is complaint with his HTN medications but inconsistent with Bipolar medications because they make him \"too tired for work and they don't really work anyway\" He iis open to treatment. Would like to go to a long term facility after discharge. HISTORY OF PRESENT ILLNESS:      The patient is a 36 y.o. male single currently homeless for a week, was living with ex luis with significant past history of bipolar depression, alcoholism    Pt has been feeling depressed chronically, but recently it has been worse  Duration: 2-3 months  Severity: Rating mood to be around 2/10 (10- good)  Quality:melancholic  Worse all day  Content: Hopeless, worthless and helpless feeling  Suicidal thoughts - want to take overdose  Associated symptoms:  Poor concentration, anhedonia, decrease motivation  Sleep and appetite- poor    Pt quit drinking after last admit in May. Relapsed with alcohol use few months ago, drink once a week, a fifth of vodka. last drink 2 days ago. Was also abusing percocet, xanax whenever he get it    [facility] her 25 y/o daughter in July for heart problems. Live with brother. Relationship issue, unemployed    The patient is currently receiving care for the above psychiatric illness.     Medications Prior to Admission:   Medications Prior to Admission: valsartan (DIOVAN) 320 MG tablet, Take 1 tablet by mouth daily  QUEtiapine (SEROQUEL) 300 MG tablet, Take 1 tablet by mouth nightly  amLODIPine (NORVASC) 10 MG tablet, Take 1 tablet by mouth daily  divalproex (DEPAKOTE) 250 MG DR tablet, Take 1 tablet by mouth every 8 hours  hydrOXYzine pamoate (VISTARIL) 50 MG capsule, TAKE 1 CAPSULE BY MOUTH TWICE DAILY AS NEEDED FOR ANXIETY  pantoprazole (PROTONIX) 40 MG tablet, Take 1 tablet by mouth every morning (before breakfast)    Compliance:no    Psychiatric Review of Systems       Depression: yes     Keeley or Hypomania:  no      Panic Attacks:  yes      Phobias:  no     Obsessions and Compulsions:  no     PTSD : no     Hallucinations:  no     Delusions:  no      Past Psychiatric History:  Prior Diagnosis:  bipolar, alcohlism  Psychiatrist: no  Therapist:no  Hospitalization: yes  Hx of Suicidal Attempts: yes  Hx of violence:  yes  ECT: no  Previous discontinued Psychiatric Med Trials: celexa, zoloft, buspar    Past Medical History:        Diagnosis Date    Depression     Hypertension 2015    trx with pills x 1 month / patient did not follow up    Smoker        Past Surgical History:        Procedure Laterality Date    OK CREATE EARDRUM OPENING,GEN ANESTH Bilateral 2/8/2018    BILATERAL MYRINGOTOMY WITH VENTILING TUBE INSERTION (PAT DONE 1/22/18) performed by Luis Alberto Luevano MD at [address] [place] [address] Sq:   Patient has no known allergies. Family History  Family History   Problem Relation Age of Onset    High Blood Pressure Mother     Heart Disease Mother     Other Mother         copd / smoker    Stroke Father     High Blood Pressure Brother     Other Brother         anxiety         Social History:  Born and Raised: [place]  Describes Childhood:   supportive  Education: [employer]  Employment: Unemployed, not seeking work  Relationships: single  Children: 3  Current Support:  poor     Legal Hx: no  Access to weapons?:  No      EXAMINATION:    REVIEW OF SYSTEMS:    ROS:  [x] All negative/unchanged except if checked.  Explain positive(checked items) below:  [] Constitutional  [] Eyes  [] Ear/Nose/Mouth/Throat  [] Respiratory  [] CV  [] GI  []   [] Musculoskeletal  [] Skin/Breast  [] Neurological  [] Endocrine  [] Heme/Lymph  [] Allergic/Immunologic    Explanation:     Vitals:  BP (!) 145/83   Pulse 64   Temp 99 °F (37.2 °C)   Resp 18   Ht 6' 4\" (1.93 m)   Wt 250 lb (113.4 kg)   SpO2 97%   BMI 30.43 kg/m²      Neurologic Exam:   Muscle Strength & Tone: full ROM  Gait: normal gait   Involuntary Movements: No    Mental Status Examination:    Level of consciousness:  within normal limits   Appearance:  ill-appearing  Behavior/Motor:  physically aggressive  Attitude toward examiner:  withdrawn  Speech:  slow   Mood: irritable and labile  Affect:  mood incongruent  Thought processes:  coherent   Association:normal  Thought content:  Suicidal Ideation:  passive   Delusions:  ideas of reference  Perceptual Disturbance:  denies any perceptual disturbance  Cognition:  oriented to person, place, and time   Attention & Concentration poor  Memory intact  Insight poor   Judgement poor   Fund of Knowledge limited    Mini Mental Status 30/30      DIAGNOSIS:     Bipolar depression   DONTE  Alcohol dependence         RISK ASSESSMENT:    SUICIDE RISK ASSESSMENT:high  HOMICIDE: high  AGITATION/VIOLENCE: high  ELOPEMENT: high    LABS: REVIEWED TODAY:  Recent Labs     12/02/22  0120   WBC 12.3*   HGB 16.1        Recent Labs     12/02/22  0120      K 4.8      CO2 26   BUN 7   CREATININE 0.89   GLUCOSE 92     Recent Labs     12/02/22  0120   BILITOT 0.3   ALKPHOS 104   AST 22   ALT 21     Lab Results   Component Value Date/Time    LABAMPH Neg 12/02/2022 01:15 AM    BARBSCNU Neg 12/02/2022 01:15 AM    LABBENZ Neg 12/02/2022 01:15 AM    LABMETH Neg 12/02/2022 01:15 AM    OPIATESCREENURINE Neg 12/02/2022 01:15 AM    PHENCYCLIDINESCREENURINE Neg 12/02/2022 01:15 AM    ETOH 83 12/02/2022 07:37 AM     Lab Results   Component Value Date/Time    TSH 1.650 12/02/2022 01:20 AM     No results found for: LITHIUM  Lab Results   Component Value Date    VALPROATE 13.5 (L) 12/02/2022     Lab Results   Component Value Date/Time    VALPROATE 13.5 12/02/2022 01:19 AM       FURTHER LABS ORDERED :      Radiology   No results found.     EKG: TRACING REVIEWED    TREATMENT PLAN:    Risk Management:  close watch and suicide risk    This patient was assessed for Medical bed necessity for the following reason:  N/A    Collateral Information:  Will obtain collateral information from the family or friends. Will obtain medical records as appropriate from out patient providers  Will consult the hospitalist for a physical exam to rule out any co-morbid physical condition. Home medication Reconciled       New Medications started during this admission :    See orders  Prn Haldol 5mg and Vistaril 50mg q6hr for extreme agitation. Trazodone as ordered for insomnia  Vistaril as ordered for anxiety  Discussed with the patient risk, benefit, alternative and common side effects for the  proposed medication treatment. Patient is consenting to the treatment.     Psychotherapy:   Encourage participation in milieu and group therapy  Individual therapy as needed      Electronically signed by Conrado Culp MD on 12/3/2022 at 9:25 AM

## 2022-12-03 NOTE — PROGRESS NOTES
Morning Community Meeting Topics    Rafael Kimble attended the morning community meeting on 12/3/22. Topics discussed today     [x] Introduction  Day of the week and date  Mask distribution  Current mask requirements  [x]Teams  Explanation of  Green and Blue team criteria  Nurses assigned to each team for today  Explanation about green and blue paper  Date  Patient's Name  Patient's Nurse  Goals  [x] Visitation  Announce the visiting hours for the day  Announce which team is allowed to have visitors for the day  Review any updated Covid 19 requirements for visitors during visitation  Vaccine Card or negative Covid test within 48 hours of visit  State Identification  Patients are reminded to alert the  at least 1 hour before visitation   [x] Unit Orientation  Coffee use  Phone location and etiquette  Shower locations  Gosper and dryer location and process  Common area expectations  Staff rounds expectation  [x] Meals   Educate patient to the menu  The patient is encouraged to fill out the menu to get preferences at mealtime  The patient is educated that if they do not fill out the menu, they will get the standard tray  The coffee pot is decaf, patient encouraged to order regular coffee from menu.   Educate patient to the meal process  Patient encouraged to eat snacks provided twice daily  Snacks may stay in patient room     [x] Discharge Process  Discharge expectations  Fill out the survey after discharge   [x] Hygiene  Daily showers encouraged  Showers availability discussed   Daily dressing encouraged  Discussed wearing street clothing  Education provided on where to place linens and clothing  Linens in the hamper  personal clothing does not go into the linen hamper  [x] Group   Patient encouraged to attend group provided  Time of Group Meetings discussed  Gentle reminder that attendance is a Physician order  [x] Movement  Chair exercises completed  Stretching completed  Notes:  GOAL : \" to see the dr\" Electronically signed by Vianney Sinclair on 12/3/2022 at 9:49 AM

## 2022-12-04 PROCEDURE — 99233 SBSQ HOSP IP/OBS HIGH 50: CPT | Performed by: PSYCHIATRY & NEUROLOGY

## 2022-12-04 PROCEDURE — 6370000000 HC RX 637 (ALT 250 FOR IP): Performed by: PSYCHIATRY & NEUROLOGY

## 2022-12-04 PROCEDURE — 1240000000 HC EMOTIONAL WELLNESS R&B

## 2022-12-04 RX ORDER — OXCARBAZEPINE 300 MG/1
300 TABLET, FILM COATED ORAL 2 TIMES DAILY
Status: DISCONTINUED | OUTPATIENT
Start: 2022-12-04 | End: 2022-12-06 | Stop reason: HOSPADM

## 2022-12-04 RX ADMIN — THERA TABS 1 TABLET: TAB at 08:55

## 2022-12-04 RX ADMIN — LURASIDONE HYDROCHLORIDE 20 MG: 20 TABLET, FILM COATED ORAL at 08:55

## 2022-12-04 RX ADMIN — HYDROXYZINE PAMOATE 50 MG: 50 CAPSULE ORAL at 08:55

## 2022-12-04 RX ADMIN — Medication 100 MG: at 08:55

## 2022-12-04 RX ADMIN — TRAZODONE HYDROCHLORIDE 50 MG: 50 TABLET ORAL at 21:06

## 2022-12-04 RX ADMIN — OXCARBAZEPINE 150 MG: 150 TABLET, FILM COATED ORAL at 08:55

## 2022-12-04 RX ADMIN — HYDROXYZINE PAMOATE 50 MG: 50 CAPSULE ORAL at 21:06

## 2022-12-04 RX ADMIN — AMLODIPINE BESYLATE 10 MG: 10 TABLET ORAL at 08:55

## 2022-12-04 RX ADMIN — FOLIC ACID 1 MG: 1 TABLET ORAL at 08:55

## 2022-12-04 RX ADMIN — ACETAMINOPHEN 650 MG: 325 TABLET ORAL at 16:10

## 2022-12-04 RX ADMIN — VALSARTAN 320 MG: 160 TABLET, FILM COATED ORAL at 08:55

## 2022-12-04 RX ADMIN — OXCARBAZEPINE 300 MG: 300 TABLET, FILM COATED ORAL at 21:06

## 2022-12-04 RX ADMIN — HYDROXYZINE PAMOATE 50 MG: 50 CAPSULE ORAL at 14:51

## 2022-12-04 ASSESSMENT — PAIN DESCRIPTION - DESCRIPTORS: DESCRIPTORS: ACHING

## 2022-12-04 ASSESSMENT — PAIN SCALES - GENERAL
PAINLEVEL_OUTOF10: 5
PAINLEVEL_OUTOF10: 4

## 2022-12-04 ASSESSMENT — PAIN SCALES - WONG BAKER: WONGBAKER_NUMERICALRESPONSE: 2

## 2022-12-04 ASSESSMENT — PAIN DESCRIPTION - ORIENTATION: ORIENTATION: LEFT;RIGHT

## 2022-12-04 NOTE — PLAN OF CARE
Pt remains on CIWA. Received a score of 2 at 1342. Pt states he was feeling withdrawal symptoms earlier because he was crying and felt sweaty but states he feels his withdrawal symptoms are better now. Reports he is now feeling light headed. Vitals obtained. Will continue to monitor. Pt educated to dangle legs before standing and to arise slowly. Pt states his anxiety and depression were through the roof this morning but states he feels less depressed and anxious now. Rates depression 4/10 and anxiety 8/10. Pt appears at ease and resting in bed. Pt has sad/flat affect. States his brother is his support system and pt is thankful he can stay with him upon discharge. Pt states the holidays are really hard since losing his daughter and he wants to find a hobby and work in order to keep his mind off of it and have better coping skills. Pt is attending group. He is calm and cooperative with assessment. Will continue to monitor. Problem: Anxiety  Goal: Will report anxiety at manageable levels  Description: INTERVENTIONS:  1. Administer medication as ordered  2. Teach and rehearse alternative coping skills  3. Provide emotional support with 1:1 interaction with staff  12/4/2022 1355 by Bobby Subramanian RN  Outcome: Progressing  12/4/2022 1346 by Bobby Subramanian RN  Outcome: Progressing  12/4/2022 0038 by Daryl Mcmullen RN  Outcome: Progressing     Problem: Coping  Goal: Pt/Family able to verbalize concerns and demonstrate effective coping strategies  Description: INTERVENTIONS:  1. Assist patient/family to identify coping skills, available support systems and cultural and spiritual values  2. Provide emotional support, including active listening and acknowledgement of concerns of patient and caregivers  3. Reduce environmental stimuli, as able  4. Instruct patient/family in relaxation techniques, as appropriate  5.  Assess for spiritual pain/suffering and initiate Spiritual Care, Psychosocial Clinical Specialist consults as needed  12/4/2022 1355 by Garrett Alvarenga RN  Outcome: Progressing  12/4/2022 1346 by Garrett Alvarenga RN  Outcome: Progressing  12/4/2022 0038 by Coreen Sanchez RN  Outcome: Progressing     Problem: Behavior  Goal: Pt/Family maintain appropriate behavior and adhere to behavioral management agreement, if implemented  Description: INTERVENTIONS:  1. Assess patient/family's coping skills and  non-compliant behavior (including use of illegal substances)  2. Notify security of behavior or suspected illegal substances which indicate the need for search of the family and/or belongings  3. Encourage verbalization of thoughts and concerns in a socially appropriate manner  4. Utilize positive, consistent limit setting strategies supporting safety of patient, staff and others  5. Encourage participation in the decision making process about the behavioral management agreement  6. If a visitor's behavior poses a threat to safety call refer to organization policy. 7. Initiate consult with , Psychosocial CNS, Spiritual Care as appropriate  12/4/2022 1355 by Garrett Alvarenga RN  Outcome: Progressing  12/4/2022 1346 by Garrett Alvarenga RN  Outcome: Progressing  12/4/2022 0038 by Coreen Sanchez RN  Outcome: Progressing     Problem: Depression/Self Harm  Goal: Effect of psychiatric condition will be minimized and patient will be protected from self harm  Description: INTERVENTIONS:  1. Assess impact of patient's symptoms on level of functioning, self care needs and offer support as indicated  2. Assess patient/family knowledge of depression, impact on illness and need for teaching  3. Provide emotional support, presence and reassurance  4. Assess for possible suicidal thoughts or ideation. If patient expresses suicidal thoughts or statements do not leave alone, initiate Suicide Precautions, move to a room close to the nursing station and obtain sitter  5.  Initiate consults as appropriate with Mental Health Professional, Spiritual Care, Psychosocial CNS, and consider a recommendation to the LIP for a Psychiatric Consultation  12/4/2022 1355 by Garrett Alvarenga RN  Outcome: Progressing  12/4/2022 1346 by Garrett Alvarenga RN  Outcome: Progressing  12/4/2022 0038 by Coreen Sanchez RN  Outcome: Progressing  Flowsheets (Taken 12/4/2022 0035)  Effect of psychiatric condition will be minimized and patient will be protected from self harm:   Assess impact of patients symptoms on level of functioning, self care needs and offer support as indicated   Assess for suicidal thoughts or ideation. If patient expresses suicidal thoughts or statements do not leave alone, initiate Suicide Precautions, move near nurse station, obtain sitter   Provide emotional support, presence and reassurance     Problem: Safety - Adult  Goal: Free from fall injury  12/4/2022 1355 by Garrett Alvarenga RN  Outcome: Progressing  12/4/2022 1346 by Garrett Alvarenga RN  Outcome: Progressing  12/4/2022 0038 by Coreen Sanchez RN  Outcome: Progressing     Problem: Drug Abuse/Detox  Goal: Will have no detox symptoms and will verbalize plan for changing drug-related behavior  Description: INTERVENTIONS:  1. Administer medication as ordered  2. Monitor physical status  3. Provide emotional support with 1:1 interaction with staff  4.  Encourage  recovery focused treatment   12/4/2022 1355 by Garrett Alvarenga RN  Outcome: Progressing  12/4/2022 1346 by Garrett Alvarenga RN  Outcome: Progressing  12/4/2022 0038 by Coreen Sanchez RN  Outcome: Progressing

## 2022-12-04 NOTE — GROUP NOTE
Group Therapy Note    Date: 12/4/2022    Group Start Time: 1600  Group End Time: 36  Group Topic: Recreational    [facility]    Hal Thakkar RN        Group Therapy Note    Attendees: 9/16       Patient's Goal:  have fun    Notes:      Status After Intervention:  Improved    Participation Level:  Active Listener    Participation Quality: Appropriate      Speech:  normal      Thought Process/Content: Logical      Affective Functioning: Congruent      Mood: euthymic      Level of consciousness:  Alert      Response to Learning: Progressing to goal      Endings: None Reported    Modes of Intervention: Activity      Discipline Responsible: Registered Nurse      Signature:  Hal Thakkar RN

## 2022-12-04 NOTE — GROUP NOTE
Group Therapy Note    Date: 12/4/2022    Group Start Time: 1000  Group End Time: 1100  Group Topic: Psychoeducation    [facility]    Reed Patton, CTRS        Group Therapy Note    Attendees: 9       Patient's Goal:  \"to move around and go to groups\"     Notes:  Pt. attended the 1000 skill group. Worked on project and was \"trying to get out of my own head. \" Attentive but quiet. Status After Intervention:  Improved    Participation Level:  Active Listener and Minimal    Participation Quality: Appropriate and Attentive      Speech:  normal      Thought Process/Content: Logical      Affective Functioning: Flat      Mood: depressed      Level of consciousness:  Alert, Oriented x4, and Attentive      Response to Learning: Progressing to goal      Endings: None Reported    Modes of Intervention: Education, Support, Socialization, and Activity      Discipline Responsible: Psychoeducational Specialist      Signature:  Oscar Garcia

## 2022-12-04 NOTE — PROGRESS NOTES
Explained and gave all am meds, pt reports generalized anxiety #8, no tremors or sweats noted. pt appears very sad depressed.

## 2022-12-04 NOTE — PLAN OF CARE
Pt visible on unit, social with peers. Calm and cooperative. Pt has good appetite. Showered this evening. Reports high anxiety and depression. Presents with sad/ flat affect. No s/sx of w/d observed/ reported. Pt compliant with medications. Denies SI, HI, or AVH. Problem: Anxiety  Goal: Will report anxiety at manageable levels  Description: INTERVENTIONS:  1. Administer medication as ordered  2. Teach and rehearse alternative coping skills  3. Provide emotional support with 1:1 interaction with staff  Outcome: Progressing     Problem: Coping  Goal: Pt/Family able to verbalize concerns and demonstrate effective coping strategies  Description: INTERVENTIONS:  1. Assist patient/family to identify coping skills, available support systems and cultural and spiritual values  2. Provide emotional support, including active listening and acknowledgement of concerns of patient and caregivers  3. Reduce environmental stimuli, as able  4. Instruct patient/family in relaxation techniques, as appropriate  5. Assess for spiritual pain/suffering and initiate Spiritual Care, Psychosocial Clinical Specialist consults as needed  Outcome: Progressing     Problem: Behavior  Goal: Pt/Family maintain appropriate behavior and adhere to behavioral management agreement, if implemented  Description: INTERVENTIONS:  1. Assess patient/family's coping skills and  non-compliant behavior (including use of illegal substances)  2. Notify security of behavior or suspected illegal substances which indicate the need for search of the family and/or belongings  3. Encourage verbalization of thoughts and concerns in a socially appropriate manner  4. Utilize positive, consistent limit setting strategies supporting safety of patient, staff and others  5. Encourage participation in the decision making process about the behavioral management agreement  6. If a visitor's behavior poses a threat to safety call refer to organization policy.   7. Initiate consult with , Psychosocial CNS, Spiritual Care as appropriate  Outcome: Progressing     Problem: Depression/Self Harm  Goal: Effect of psychiatric condition will be minimized and patient will be protected from self harm  Description: INTERVENTIONS:  1. Assess impact of patient's symptoms on level of functioning, self care needs and offer support as indicated  2. Assess patient/family knowledge of depression, impact on illness and need for teaching  3. Provide emotional support, presence and reassurance  4. Assess for possible suicidal thoughts or ideation. If patient expresses suicidal thoughts or statements do not leave alone, initiate Suicide Precautions, move to a room close to the nursing station and obtain sitter  5. Initiate consults as appropriate with Mental Health Professional, Spiritual Care, Psychosocial CNS, and consider a recommendation to the LIP for a Psychiatric Consultation  Outcome: Progressing  Flowsheets (Taken 12/4/2022 0035)  Effect of psychiatric condition will be minimized and patient will be protected from self harm:   Assess impact of patients symptoms on level of functioning, self care needs and offer support as indicated   Assess for suicidal thoughts or ideation. If patient expresses suicidal thoughts or statements do not leave alone, initiate Suicide Precautions, move near nurse station, obtain sitter   Provide emotional support, presence and reassurance     Problem: Safety - Adult  Goal: Free from fall injury  Outcome: Progressing     Problem: Drug Abuse/Detox  Goal: Will have no detox symptoms and will verbalize plan for changing drug-related behavior  Description: INTERVENTIONS:  1. Administer medication as ordered  2. Monitor physical status  3. Provide emotional support with 1:1 interaction with staff  4.  Encourage  recovery focused treatment   Outcome: Progressing

## 2022-12-04 NOTE — GROUP NOTE
Group Therapy Note    Date: 12/4/2022    Group Start Time: 1630  Group End Time: 4824  Group Topic: Healthy Living/Wellness    [facility]    Nimo Reid RN        Group Therapy Note    Attendees: 9/16       Patient's Goal: Learning Healthy Living through therapeutic socialization. Status After Intervention:  Improved    Participation Level:  Active Listener    Participation Quality: Resistant      Speech:  mute      Thought Process/Content: Logical      Affective Functioning: Congruent      Mood: depressed      Level of consciousness:  Oriented x4      Response to Learning: Progressing to goal      Endings: None Reported    Modes of Intervention: Education, Support, and Socialization      Discipline Responsible: Registered Nurse      Signature:  Nimo Reid RN

## 2022-12-04 NOTE — PROGRESS NOTES
[facility]. FOLLOW-UP NOTE       12/4/2022     Patient was seen and examined in person, Chart reviewed   Patient's case discussed with staff/team    Chief Complaint: Depression SI    Interim History:     Pt report feeling depressed and anxious  Pt feel hopeless and worthless  Suicidal thoughts ++  Want to stay with his brother on discharge  Pt feel that he is still going through withdrawal  No AH or VH  Appetite:   [] Normal/Unchanged  [] Increased  [x] Decreased      Sleep:       [] Normal/Unchanged  [x] Fair       [] Poor              Energy:    [] Normal/Unchanged  [] Increased  [x] Decreased        SI [x] Present  [] Absent    HI  []Present  [x] Absent     Aggression:  [] yes  [x] no    Patient is [] able  [x] unable to CONTRACT FOR SAFETY     PAST MEDICAL/PSYCHIATRIC HISTORY:   Past Medical History:   Diagnosis Date    Depression     Hypertension 2015    trx with pills x 1 month / patient did not follow up    Smoker        FAMILY/SOCIAL HISTORY:  Family History   Problem Relation Age of Onset    High Blood Pressure Mother     Heart Disease Mother     Other Mother         copd / smoker    Stroke Father     High Blood Pressure Brother     Other Brother         anxiety     Social History     Socioeconomic History    Marital status: Single     Spouse name: Not on file    Number of children: Not on file    Years of education: Not on file    Highest education level: Not on file   Occupational History    Not on file   Tobacco Use    Smoking status: Every Day     Packs/day: 1.00     Years: 20.00     Pack years: 20.00     Types: Cigarettes    Smokeless tobacco: Never   Substance and Sexual Activity    Alcohol use: Not Currently     Comment: 5th of vodka a day    Drug use: Yes     Types: Marijuana (Radha Stock), Opiates     Sexual activity: Not on file   Other Topics Concern    Not on file   Social History Narrative    Not on file     Social Determinants of Health     Financial Resource Strain: Low Risk     Difficulty of Paying Living Expenses: Not hard at all   Food Insecurity: No Food Insecurity    Worried About Running Out of Food in the Last Year: Never true    Ran Out of Food in the Last Year: Never true   Transportation Needs: Not on file   Physical Activity: Not on file   Stress: Not on file   Social Connections: Not on file   Intimate Partner Violence: Not on file   Housing Stability: Not on file           ROS:  [x] All negative/unchanged except if checked.  Explain positive(checked items) below:  [] Constitutional  [] Eyes  [] Ear/Nose/Mouth/Throat  [] Respiratory  [] CV  [] GI  []   [] Musculoskeletal  [] Skin/Breast  [] Neurological  [] Endocrine  [] Heme/Lymph  [] Allergic/Immunologic    Explanation:     MEDICATIONS:    Current Facility-Administered Medications:     OXcarbazepine (TRILEPTAL) tablet 300 mg, 300 mg, Oral, BID, Peter Calzada MD    amLODIPine (NORVASC) tablet 10 mg, 10 mg, Oral, Daily, Peter Cazlada MD, 10 mg at 12/04/22 0855    valsartan (DIOVAN) tablet 320 mg, 320 mg, Oral, Daily, Peter Calzada MD, 320 mg at 12/04/22 0855    lurasidone (LATUDA) tablet 20 mg, 20 mg, Oral, Daily, Peter Calzada MD, 20 mg at 12/04/22 0855    acetaminophen (TYLENOL) tablet 650 mg, 650 mg, Oral, Q4H PRN, Peter Calzada MD, 650 mg at 12/03/22 1239    magnesium hydroxide (MILK OF MAGNESIA) 400 MG/5ML suspension 30 mL, 30 mL, Oral, Daily PRN, Peter Calzada MD    aluminum & magnesium hydroxide-simethicone (MAALOX) 200-200-20 MG/5ML suspension 30 mL, 30 mL, Oral, PRN, Peter Calzada MD    haloperidol (HALDOL) tablet 5 mg, 5 mg, Oral, Q6H PRN **OR** haloperidol lactate (HALDOL) injection 5 mg, 5 mg, IntraMUSCular, Q6H PRN, Peter Calzada MD    benztropine mesylate (COGENTIN) injection 2 mg, 2 mg, IntraMUSCular, BID PRN, Peter Calzada MD    hydrOXYzine pamoate (VISTARIL) capsule 50 mg, 50 mg, Oral, Q6H PRN, 50 mg at 12/04/22 0855 **OR** hydrOXYzine (VISTARIL) injection 50 mg, 50 mg, IntraMUSCular, Q6H PRN, Paolo Calderon MD    traZODone (DESYREL) tablet 50 mg, 50 mg, Oral, Nightly PRN, Paolo Calderon MD, 50 mg at 68/59/99 2883    folic acid (FOLVITE) tablet 1 mg, 1 mg, Oral, Daily, Paolo Calderon MD, 1 mg at 12/04/22 0855    multivitamin 1 tablet, 1 tablet, Oral, Daily, Paolo Calderon MD, 1 tablet at 12/04/22 0855    chlordiazePOXIDE (LIBRIUM) capsule 10 mg, 10 mg, Oral, Q4H PRN **OR** chlordiazePOXIDE (LIBRIUM) capsule 25 mg, 25 mg, Oral, Q4H PRN, 25 mg at 12/02/22 2054 **OR** chlordiazePOXIDE (LIBRIUM) capsule 50 mg, 50 mg, Oral, Q2H PRN **OR** chlordiazePOXIDE (LIBRIUM) capsule 75 mg, 75 mg, Oral, Q1H PRN **OR** LORazepam (ATIVAN) injection 4 mg, 4 mg, IntraMUSCular, Q1H PRN, Paolo Calderon MD    thiamine tablet 100 mg, 100 mg, Oral, Daily, Paolo Calderon MD, 100 mg at 12/04/22 8337      Examination:  BP (!) 140/93   Pulse 57   Temp 98.4 °F (36.9 °C)   Resp 16   Ht 6' 4\" (1.93 m)   Wt 250 lb (113.4 kg)   SpO2 99%   BMI 30.43 kg/m²   Gait - steady  Medication side effects(SE): no    Mental Status Examination:    Level of consciousness:  within normal limits   Appearance:  fair grooming and fair hygiene  Behavior/Motor:  psychomotor retardation  Attitude toward examiner:  withdrawn  Speech:  slow   Mood: constricted, decreased range, and depressed  Affect:  mood congruent  Thought processes:  slow   Thought content:  Suicidal Ideation:  passive  Delusions:  no evidence of delusions  Perceptual Disturbance:  denies any perceptual disturbance  Cognition:  oriented to person, place, and time   Concentration poor  Insight poor   Judgement poor     ASSESSMENT:   Patient symptoms are:  [] Well controlled  [] Improving  [] Worsening  [] No change      Diagnosis:   Principal Problem:    Bipolar depression ([facility] 75.)  Resolved Problems:    * No resolved hospital problems.  *      LABS:    Recent Labs     12/02/22  0120   WBC 12.3*   HGB 16.1    Recent Labs     12/02/22  0120      K 4.8      CO2 26   BUN 7   CREATININE 0.89   GLUCOSE 92     Recent Labs     12/02/22  0120   BILITOT 0.3   ALKPHOS 104   AST 22   ALT 21     Lab Results   Component Value Date/Time    LABAMPH Neg 12/02/2022 01:15 AM    BARBSCNU Neg 12/02/2022 01:15 AM    LABBENZ Neg 12/02/2022 01:15 AM    LABMETH Neg 12/02/2022 01:15 AM    OPIATESCREENURINE Neg 12/02/2022 01:15 AM    PHENCYCLIDINESCREENURINE Neg 12/02/2022 01:15 AM    ETOH 83 12/02/2022 07:37 AM     Lab Results   Component Value Date/Time    TSH 1.650 12/02/2022 01:20 AM     No results found for: LITHIUM  Lab Results   Component Value Date    VALPROATE 13.5 (L) 12/02/2022       RISK ASSESSMENT: high risk of suicide    Treatment Plan:  Reviewed current Medications with the patient. Medication as ordered  Risks, benefits, side effects, drug-to-drug interactions and alternatives to treatment were discussed. Collateral information:   CD evaluation  Encourage patient to attend group and other milieu activities.   Discharge planning discussed with the patient and treatment team.    PSYCHOTHERAPY/COUNSELING:  [x] Therapeutic interview  [x] Supportive  [] CBT  [] Ongoing  [] Other    [x] Patient continues to need, on a daily basis, active treatment furnished directly by or requiring the supervision of inpatient psychiatric personnel      Anticipated Length of stay:            Electronically signed by Casie Rojas MD on 12/4/2022 at 9:28 AM

## 2022-12-04 NOTE — GROUP NOTE
Group Therapy Note    Date: 12/3/2022    Group Start Time: 2050  Group End Time: 2120  Group Topic: Wrap-Up    [facility]    Og Reynolds RN        Group Therapy Note    Attendees: 9/14       Patient's Goal: \"regulate my meds\"    Notes:  Goal in progress, per patient. Status After Intervention:  Unchanged    Participation Level:  Active Listener and Minimal    Participation Quality: Appropriate, Attentive, and Sharing      Speech:  normal      Thought Process/Content: Logical      Affective Functioning: Congruent and Flat      Mood: euthymic      Level of consciousness:  Alert, Oriented x4, and Attentive      Response to Learning: Capable of insight      Endings: None Reported    Modes of Intervention: Support      Discipline Responsible: Registered Nurse      Signature:  Og Reynolds RN

## 2022-12-04 NOTE — GROUP NOTE
Group Therapy Note    Date: 12/4/2022    Group Start Time: 1100  Group End Time: 1130  Group Topic: Group Therapy    [facility]    GIANNA Stein        Group Therapy Note    Attendees: 8       Patient's Goal:  To participate in a goal oriented group. Notes:  Patient stated his goal is to clarify what medication works for him and live a stable life. Status After Intervention:  Improved    Participation Level: Active Listener    Participation Quality: Appropriate      Speech:  normal      Thought Process/Content: Logical      Affective Functioning: Congruent      Mood: elevated      Level of consciousness:  Alert      Response to Learning: Able to verbalize current knowledge/experience      Endings: None Reported    Modes of Intervention: Education      Discipline Responsible: /Counselor      Signature:   GIANNA Stein

## 2022-12-04 NOTE — PROGRESS NOTES
Pt reports feeling better, anxiety #6, offered and gave vistaril 50mg, no tremors or sweats noted. Pt reports its warm in his room nose is dry and bleeding lighty at times.

## 2022-12-04 NOTE — PROGRESS NOTES
Morning Community Meeting Topics    Cedric Rousseau attended the morning community meeting on 12/4/22. Topics discussed today     [x] Introduction  Day of the week and date  Mask distribution  Current mask requirements  [x]Teams  Explanation of  Green and Blue team criteria  Nurses assigned to each team for today  Explanation about green and blue paper  Date  Patient's Name  Patient's Nurse  Goals  [x] Visitation  Announce the visiting hours for the day  Announce which team is allowed to have visitors for the day  Review any updated Covid 19 requirements for visitors during visitation  Vaccine Card or negative Covid test within 48 hours of visit  State Identification  Patients are reminded to alert the  at least 1 hour before visitation   [x] Unit Orientation  Coffee use  Phone location and etiquette  Shower locations  [place] and dryer location and process  Common area expectations  Staff rounds expectation  [x] Meals   Educate patient to the menu  The patient is encouraged to fill out the menu to get preferences at mealtime  The patient is educated that if they do not fill out the menu, they will get the standard tray  The coffee pot is decaf, patient encouraged to order regular coffee from menu.   Educate patient to the meal process  Patient encouraged to eat snacks provided twice daily  Snacks may stay in patient room     [x] Discharge Process  Discharge expectations  Fill out the survey after discharge   [x] Hygiene  Daily showers encouraged  Showers availability discussed   Daily dressing encouraged  Discussed wearing street clothing  Education provided on where to place linens and clothing  Linens in the hamper  personal clothing does not go into the linen hamper  [x] Group   Patient encouraged to attend group provided  Time of Group Meetings discussed  Gentle reminder that attendance is a Physician order  [x] Movement  Chair exercises completed  Stretching completed  Notes:  GOAL : \" to move around a little and to go to a group\" Electronically signed by ARYA Dawson on 12/4/2022 at 9:37 AM

## 2022-12-05 LAB
EKG ATRIAL RATE: 70 BPM
EKG P AXIS: 73 DEGREES
EKG P-R INTERVAL: 136 MS
EKG Q-T INTERVAL: 394 MS
EKG QRS DURATION: 98 MS
EKG QTC CALCULATION (BAZETT): 425 MS
EKG R AXIS: 51 DEGREES
EKG T AXIS: 42 DEGREES
EKG VENTRICULAR RATE: 70 BPM

## 2022-12-05 PROCEDURE — 6370000000 HC RX 637 (ALT 250 FOR IP): Performed by: INTERNAL MEDICINE

## 2022-12-05 PROCEDURE — 1240000000 HC EMOTIONAL WELLNESS R&B

## 2022-12-05 PROCEDURE — 90833 PSYTX W PT W E/M 30 MIN: CPT | Performed by: PSYCHIATRY & NEUROLOGY

## 2022-12-05 PROCEDURE — 6370000000 HC RX 637 (ALT 250 FOR IP): Performed by: PSYCHIATRY & NEUROLOGY

## 2022-12-05 PROCEDURE — 99232 SBSQ HOSP IP/OBS MODERATE 35: CPT | Performed by: PSYCHIATRY & NEUROLOGY

## 2022-12-05 RX ORDER — FLUTICASONE PROPIONATE 50 MCG
1 SPRAY, SUSPENSION (ML) NASAL DAILY
Status: DISCONTINUED | OUTPATIENT
Start: 2022-12-05 | End: 2022-12-06 | Stop reason: HOSPADM

## 2022-12-05 RX ADMIN — VALSARTAN 320 MG: 160 TABLET, FILM COATED ORAL at 08:30

## 2022-12-05 RX ADMIN — OXCARBAZEPINE 300 MG: 300 TABLET, FILM COATED ORAL at 21:00

## 2022-12-05 RX ADMIN — FOLIC ACID 1 MG: 1 TABLET ORAL at 08:30

## 2022-12-05 RX ADMIN — FLUTICASONE PROPIONATE 1 SPRAY: 50 SPRAY, METERED NASAL at 18:22

## 2022-12-05 RX ADMIN — ACETAMINOPHEN 650 MG: 325 TABLET ORAL at 16:41

## 2022-12-05 RX ADMIN — AMLODIPINE BESYLATE 10 MG: 10 TABLET ORAL at 08:30

## 2022-12-05 RX ADMIN — TRAZODONE HYDROCHLORIDE 50 MG: 50 TABLET ORAL at 21:00

## 2022-12-05 RX ADMIN — HYDROXYZINE PAMOATE 50 MG: 50 CAPSULE ORAL at 09:19

## 2022-12-05 RX ADMIN — HYDROXYZINE PAMOATE 50 MG: 50 CAPSULE ORAL at 15:16

## 2022-12-05 RX ADMIN — Medication 100 MG: at 14:16

## 2022-12-05 RX ADMIN — OXCARBAZEPINE 300 MG: 300 TABLET, FILM COATED ORAL at 08:30

## 2022-12-05 RX ADMIN — HYDROXYZINE PAMOATE 50 MG: 50 CAPSULE ORAL at 21:02

## 2022-12-05 RX ADMIN — LURASIDONE HYDROCHLORIDE 40 MG: 40 TABLET, FILM COATED ORAL at 08:30

## 2022-12-05 RX ADMIN — THERA TABS 1 TABLET: TAB at 08:30

## 2022-12-05 ASSESSMENT — PAIN SCALES - GENERAL: PAINLEVEL_OUTOF10: 8

## 2022-12-05 ASSESSMENT — PAIN DESCRIPTION - LOCATION: LOCATION: HEAD

## 2022-12-05 NOTE — PLAN OF CARE
Pt visible on unit, socializing with peers and participating in unit activities. Pt calm and cooperative. Eating, drinking, sleeping. No s/sx of w/d observed or reported. Pt reports hoping for d/c soon d/t daughter's birthday on the 7th and sumit coming up. Pt states he needs to collect a paycheck and is hoping to get back on his feet. States feeling stable on medications and hopeful that they will work for his mood. Denies side effects from medications. Denies needs concerns. Denies SI, HI, or AVH. Problem: Anxiety  Goal: Will report anxiety at manageable levels  Description: INTERVENTIONS:  1. Administer medication as ordered  2. Teach and rehearse alternative coping skills  3. Provide emotional support with 1:1 interaction with staff  12/4/2022 2054 by Asher Levy RN  Outcome: Progressing  Flowsheets (Taken 12/4/2022 1412 by Cely Sol RN)  Will report anxiety at manageable levels:   Administer medication as ordered   Teach and rehearse alternative coping skills   Provide emotional support with 1:1 interaction with staff  12/4/2022 1355 by Cely Sol RN  Outcome: Progressing  12/4/2022 1346 by Cely Sol RN  Outcome: Progressing     Problem: Coping  Goal: Pt/Family able to verbalize concerns and demonstrate effective coping strategies  Description: INTERVENTIONS:  1. Assist patient/family to identify coping skills, available support systems and cultural and spiritual values  2. Provide emotional support, including active listening and acknowledgement of concerns of patient and caregivers  3. Reduce environmental stimuli, as able  4. Instruct patient/family in relaxation techniques, as appropriate  5.  Assess for spiritual pain/suffering and initiate Spiritual Care, Psychosocial Clinical Specialist consults as needed  12/4/2022 2054 by Asher Levy RN  Outcome: Progressing  12/4/2022 1355 by Cely Sol RN  Outcome: Progressing  12/4/2022 1346 by Olayinka NADEGE Reyes  Outcome: Progressing     Problem: Behavior  Goal: Pt/Family maintain appropriate behavior and adhere to behavioral management agreement, if implemented  Description: INTERVENTIONS:  1. Assess patient/family's coping skills and  non-compliant behavior (including use of illegal substances)  2. Notify security of behavior or suspected illegal substances which indicate the need for search of the family and/or belongings  3. Encourage verbalization of thoughts and concerns in a socially appropriate manner  4. Utilize positive, consistent limit setting strategies supporting safety of patient, staff and others  5. Encourage participation in the decision making process about the behavioral management agreement  6. If a visitor's behavior poses a threat to safety call refer to organization policy. 7. Initiate consult with , Psychosocial CNS, Spiritual Care as appropriate  12/4/2022 2054 by Nataliia Peck RN  Outcome: Progressing  12/4/2022 1355 by Clarke Zuniga RN  Outcome: Progressing  12/4/2022 1346 by Clarke Zuniga RN  Outcome: Progressing     Problem: Depression/Self Harm  Goal: Effect of psychiatric condition will be minimized and patient will be protected from self harm  Description: INTERVENTIONS:  1. Assess impact of patient's symptoms on level of functioning, self care needs and offer support as indicated  2. Assess patient/family knowledge of depression, impact on illness and need for teaching  3. Provide emotional support, presence and reassurance  4. Assess for possible suicidal thoughts or ideation. If patient expresses suicidal thoughts or statements do not leave alone, initiate Suicide Precautions, move to a room close to the nursing station and obtain sitter  5.  Initiate consults as appropriate with Mental Health Professional, Spiritual Care, Psychosocial CNS, and consider a recommendation to the [facility]IP for a Psychiatric Consultation  12/4/2022 2054 by Yousuf Ardon NADEGE Reyes  Outcome: Progressing  Flowsheets  Taken 12/4/2022 2053 by Milan Hudson RN  Effect of psychiatric condition will be minimized and patient will be protected from self harm:   Assess for suicidal thoughts or ideation. If patient expresses suicidal thoughts or statements do not leave alone, initiate Suicide Precautions, move near nurse station, obtain sitter   Provide emotional support, presence and reassurance   Assess patient/family knowledge of depression, impact on illness and need for teaching  Taken 12/4/2022 1429 by Van Hampton RN  Effect of psychiatric condition will be minimized and patient will be protected from self harm:   Assess impact of patients symptoms on level of functioning, self care needs and offer support as indicated   Assess patient/family knowledge of depression, impact on illness and need for teaching   Provide emotional support, presence and reassurance   Assess for suicidal thoughts or ideation. If patient expresses suicidal thoughts or statements do not leave alone, initiate Suicide Precautions, move near nurse station, obtain sitter   Initiate consults as appropriate with Mental Health Professional, Spiritual Care, Psychosocial CNS, and consider a recommendation to the [facility]IP for a Psychiatric Consultation  12/4/2022 1355 by Van Hampton RN  Outcome: Progressing  12/4/2022 1346 by Van Hampton RN  Outcome: Progressing     Problem: Safety - Adult  Goal: Free from fall injury  12/4/2022 2054 by Milan Hudson RN  Outcome: Progressing  12/4/2022 1355 by Van Hampton RN  Outcome: Progressing  12/4/2022 1346 by Van Hampton RN  Outcome: Progressing     Problem: Drug Abuse/Detox  Goal: Will have no detox symptoms and will verbalize plan for changing drug-related behavior  Description: INTERVENTIONS:  1. Administer medication as ordered  2. Monitor physical status  3. Provide emotional support with 1:1 interaction with staff  4.  Encourage recovery focused treatment   12/4/2022 2054 by Ju Mitchell RN  Outcome: Progressing  12/4/2022 1355 by Jose Miguel Winn RN  Outcome: Progressing  12/4/2022 1346 by Jose Miguel Winn RN  Outcome: Progressing

## 2022-12-05 NOTE — GROUP NOTE
Group Therapy Note    Date: 12/5/2022    Group Start Time: 1300  Group End Time: 9719  Group Topic: Healthy Living/Wellness    [facility]    [facility]        Group Therapy Note    Attendees: 7/19       Patient's Goal:  To participate in an activity sharing and socializing    Notes:  Patient minimally participated    Status After Intervention:  Unchanged    Participation Level: Minimal    Participation Quality: Resistant      Speech:  hesitant      Thought Process/Content: Logical      Affective Functioning: Constricted/Restricted      Mood: anxious      Level of consciousness:  Alert      Response to Learning: Resistant      Endings: None Reported    Modes of Intervention: Socialization      Discipline Responsible: [address] [facility]      Signature:  [facility]

## 2022-12-05 NOTE — PLAN OF CARE
Patient has been visible on unit, watching TV and socializing with peers. Patient rates his anxiety 2/10, depression 3/10. He reports doing better now that he is back on medication and verbalized the importance of continuing to take his medication after discharge. Patient reports good appetite and good sleep. Problem: Anxiety  Goal: Will report anxiety at manageable levels  Description: INTERVENTIONS:  1. Administer medication as ordered  2. Teach and rehearse alternative coping skills  3. Provide emotional support with 1:1 interaction with staff  12/5/2022 1202 by Jolene Molina RN  Outcome: Progressing  12/5/2022 1201 by Jolene Molina RN  Outcome: Progressing  Flowsheets (Taken 12/5/2022 1154)  Will report anxiety at manageable levels:   Administer medication as ordered   Teach and rehearse alternative coping skills   Provide emotional support with 1:1 interaction with staff     Problem: Coping  Goal: Pt/Family able to verbalize concerns and demonstrate effective coping strategies  Description: INTERVENTIONS:  1. Assist patient/family to identify coping skills, available support systems and cultural and spiritual values  2. Provide emotional support, including active listening and acknowledgement of concerns of patient and caregivers  3. Reduce environmental stimuli, as able  4. Instruct patient/family in relaxation techniques, as appropriate  5.  Assess for spiritual pain/suffering and initiate Spiritual Care, Psychosocial Clinical Specialist consults as needed  12/5/2022 1202 by Jolene Molina RN  Outcome: Progressing  12/5/2022 1201 by Jolene Molina RN  Outcome: Progressing  Flowsheets (Taken 12/5/2022 1154)  Patient/family able to verbalize anxieties, fears, and concerns, and demonstrate effective coping:   Assess for spiritual pain/suffering and initiate Spiritual Care, Psychosocial Clinical Specialist consults as needed   Instruct patient/family in relaxation techniques, as appropriate   Reduce environmental stimuli, as able   Provide emotional support, including active listening and acknowledgement of concerns of patient and caregivers   Assist patient/family to identify coping skills, available support systems and cultural and spiritual values     Problem: Behavior  Goal: Pt/Family maintain appropriate behavior and adhere to behavioral management agreement, if implemented  Description: INTERVENTIONS:  1. Assess patient/family's coping skills and  non-compliant behavior (including use of illegal substances)  2. Notify security of behavior or suspected illegal substances which indicate the need for search of the family and/or belongings  3. Encourage verbalization of thoughts and concerns in a socially appropriate manner  4. Utilize positive, consistent limit setting strategies supporting safety of patient, staff and others  5. Encourage participation in the decision making process about the behavioral management agreement  6. If a visitor's behavior poses a threat to safety call refer to organization policy. 7. Initiate consult with , Psychosocial CNS, Spiritual Care as appropriate  Outcome: Progressing  Flowsheets (Taken 12/5/2022 1153)  Patient/family maintains appropriate behavior and adheres to behavioral management agreement, if implemented:   Initiate consult with , Psychosocial Clinical Nurse Specialist, [facility] as appropriate   If a patients behavior jeopardizes the safety of the patient, staff, or others refer to organization policy.  If a visitors behavior poses a threat to safety refer to organization policy   Implement a [place] Agreement if patient meets criteria   Encourage participation in the decision making process about the behavioral management agreement   Utilize positive, consistent limit setting strategies supporting safety of patient, staff and others   Encourage verbalization of thoughts and concerns in a socially appropriate manner   Assess patient/familys coping skills and  non-compliant behavior (including use of illegal substances)   Notify security of behavior or suspected illegal substances which indicate the need for search of the patient and/or belongings     Problem: Depression/Self Harm  Goal: Effect of psychiatric condition will be minimized and patient will be protected from self harm  Description: INTERVENTIONS:  1. Assess impact of patient's symptoms on level of functioning, self care needs and offer support as indicated  2. Assess patient/family knowledge of depression, impact on illness and need for teaching  3. Provide emotional support, presence and reassurance  4. Assess for possible suicidal thoughts or ideation. If patient expresses suicidal thoughts or statements do not leave alone, initiate Suicide Precautions, move to a room close to the nursing station and obtain sitter  5. Initiate consults as appropriate with Mental Health Professional, Spiritual Care, Psychosocial CNS, and consider a recommendation to the LIP for a Psychiatric Consultation  Outcome: Progressing  Flowsheets (Taken 12/5/2022 1154)  Effect of psychiatric condition will be minimized and patient will be protected from self harm:   Initiate consults as appropriate with Mental Health Professional, Spiritual Care, Psychosocial CNS, and consider a recommendation to the LIP for a Psychiatric Consultation   Assess for suicidal thoughts or ideation.  If patient expresses suicidal thoughts or statements do not leave alone, initiate Suicide Precautions, move near nurse station, obtain sitter   Provide emotional support, presence and reassurance   Assess patient/family knowledge of depression, impact on illness and need for teaching   Assess impact of patients symptoms on level of functioning, self care needs and offer support as indicated     Problem: Safety - Adult  Goal: Free from fall injury  Outcome: Progressing     Problem: Drug Abuse/Detox  Goal: Will have no detox symptoms and will verbalize plan for changing drug-related behavior  Description: INTERVENTIONS:  1. Administer medication as ordered  2. Monitor physical status  3. Provide emotional support with 1:1 interaction with staff  4.  Encourage  recovery focused treatment   Outcome: Progressing  Flowsheets (Taken 12/5/2022 1154)  Will have no detox symptoms and will verbalize plan for changing drug-related behavior:   Encourage recovery focused treatment   Administer medication as ordered   Monitor physical status   Provide emotional support with 1:1 interaction with staff

## 2022-12-05 NOTE — GROUP NOTE
Group Therapy Note    Date: 12/5/2022    Group Start Time: 1600  Group End Time: 36  Group Topic: Healthy Living/Wellness    [facility]    Margarita Suero RN        Group Therapy Note    Attendees: 9/19       Patient's Goal:  Group on Self esteem with nursing students    Notes:  Good participation    Status After Intervention:  Unchanged    Participation Level:  Active Listener    Participation Quality: Appropriate      Speech:  normal      Thought Process/Content: Logical      Affective Functioning: Congruent      Mood: euthymic      Level of consciousness:  Alert      Response to Learning: Progressing to goal      Endings: None Reported    Modes of Intervention: Support      Discipline Responsible: Registered Nurse      Signature:  Margarita Suero RN

## 2022-12-05 NOTE — PROGRESS NOTES
[facility]. FOLLOW-UP NOTE       12/5/2022     Patient was seen and examined in person, Chart reviewed   Patient's case discussed with staff/team    Chief Complaint: Depression SI    Interim History:   NO active withdrawal symptoms  Pt report feeling less depressed and anxious  Grieving the loss of his daughter  Want to stay with his brother on discharge  No AH or VH  Not been to any counseling  Want to eventually go to sober living after taking care of his home situation  Daughter birthday on the 7th   Appetite:   [x] Normal/Unchanged  [] Increased  [] Decreased      Sleep:       [] Normal/Unchanged  [x] Fair       [] Poor              Energy:    [x] Normal/Unchanged  [] Increased  [] Decreased        SI [] Present  [x] Absent    HI  []Present  [x] Absent     Aggression:  [] yes  [x] no    Patient is [x] able  [] unable to CONTRACT FOR SAFETY     PAST MEDICAL/PSYCHIATRIC HISTORY:   Past Medical History:   Diagnosis Date    Depression     Hypertension 2015    trx with pills x 1 month / patient did not follow up    Smoker        FAMILY/SOCIAL HISTORY:  Family History   Problem Relation Age of Onset    High Blood Pressure Mother     Heart Disease Mother     Other Mother         copd / smoker    Stroke Father     High Blood Pressure Brother     Other Brother         anxiety     Social History     Socioeconomic History    Marital status: Single     Spouse name: Not on file    Number of children: Not on file    Years of education: Not on file    Highest education level: Not on file   Occupational History    Not on file   Tobacco Use    Smoking status: Every Day     Packs/day: 1.00     Years: 20.00     Pack years: 20.00     Types: Cigarettes    Smokeless tobacco: Never   Substance and Sexual Activity    Alcohol use: Not Currently     Comment: 5th of vodka a day    Drug use: Yes     Types: Marijuana (Daphane Gutter), Opiates     Sexual activity: Not on file   Other Topics Concern    Not on file   Social History Narrative    Not on file     Social Determinants of Health     Financial Resource Strain: Low Risk     Difficulty of Paying Living Expenses: Not hard at all   Food Insecurity: No Food Insecurity    Worried About Running Out of Food in the Last Year: Never true    Ran Out of Food in the Last Year: Never true   Transportation Needs: Not on file   Physical Activity: Not on file   Stress: Not on file   Social Connections: Not on file   Intimate Partner Violence: Not on file   Housing Stability: Not on file           ROS:  [x] All negative/unchanged except if checked.  Explain positive(checked items) below:  [] Constitutional  [] Eyes  [] Ear/Nose/Mouth/Throat  [] Respiratory  [] CV  [] GI  []   [] Musculoskeletal  [] Skin/Breast  [] Neurological  [] Endocrine  [] Heme/Lymph  [] Allergic/Immunologic    Explanation:     MEDICATIONS:    Current Facility-Administered Medications:     OXcarbazepine (TRILEPTAL) tablet 300 mg, 300 mg, Oral, BID, Weston Callahan MD, 300 mg at 12/05/22 0830    lurasidone (LATUDA) tablet 40 mg, 40 mg, Oral, Daily, Weston Callahan MD, 40 mg at 12/05/22 0830    amLODIPine (NORVASC) tablet 10 mg, 10 mg, Oral, Daily, Weston Callahan MD, 10 mg at 12/05/22 0830    valsartan (DIOVAN) tablet 320 mg, 320 mg, Oral, Daily, Weston Callahan MD, 320 mg at 12/05/22 0830    acetaminophen (TYLENOL) tablet 650 mg, 650 mg, Oral, Q4H PRN, Weston Callahan MD, 650 mg at 12/04/22 1610    magnesium hydroxide (MILK OF MAGNESIA) 400 MG/5ML suspension 30 mL, 30 mL, Oral, Daily PRN, Weston Callahan MD    aluminum & magnesium hydroxide-simethicone (MAALOX) 200-200-20 MG/5ML suspension 30 mL, 30 mL, Oral, PRN, Weston Callahan MD    haloperidol (HALDOL) tablet 5 mg, 5 mg, Oral, Q6H PRN **OR** haloperidol lactate (HALDOL) injection 5 mg, 5 mg, IntraMUSCular, Q6H PRN, Weston Callahan MD    benztropine mesylate (COGENTIN) injection 2 mg, 2 mg, IntraMUSCular, BID PRN, Weston Callahan MD hydrOXYzine pamoate (VISTARIL) capsule 50 mg, 50 mg, Oral, Q6H PRN, 50 mg at 12/05/22 0919 **OR** hydrOXYzine (VISTARIL) injection 50 mg, 50 mg, IntraMUSCular, Q6H PRN, Aries Quintero MD    traZODone (DESYREL) tablet 50 mg, 50 mg, Oral, Nightly PRN, Aries Quintero MD, 50 mg at 23/13/26 6920    folic acid (FOLVITE) tablet 1 mg, 1 mg, Oral, Daily, Aries Quintero MD, 1 mg at 12/05/22 0830    multivitamin 1 tablet, 1 tablet, Oral, Daily, Aries Quintero MD, 1 tablet at 12/05/22 0830    chlordiazePOXIDE (LIBRIUM) capsule 10 mg, 10 mg, Oral, Q4H PRN **OR** chlordiazePOXIDE (LIBRIUM) capsule 25 mg, 25 mg, Oral, Q4H PRN, 25 mg at 12/02/22 2054 **OR** chlordiazePOXIDE (LIBRIUM) capsule 50 mg, 50 mg, Oral, Q2H PRN **OR** chlordiazePOXIDE (LIBRIUM) capsule 75 mg, 75 mg, Oral, Q1H PRN **OR** LORazepam (ATIVAN) injection 4 mg, 4 mg, IntraMUSCular, Q1H PRN, Aries Quintero MD    thiamine tablet 100 mg, 100 mg, Oral, Daily, Aries Quintero MD, 100 mg at 12/04/22 7595      Examination:  /89   Pulse 73   Temp 97.7 °F (36.5 °C)   Resp 18   Ht 6' 4\" (1.93 m)   Wt 250 lb (113.4 kg)   SpO2 97%   BMI 30.43 kg/m²   Gait - steady  Medication side effects(SE): no    Mental Status Examination:    Level of consciousness:  within normal limits   Appearance:  fair grooming and fair hygiene  Behavior/Motor:  psychomotor retardation  Attitude toward examiner:  withdrawn  Speech:  slow   Mood: less depressed  Affect:  mood congruent  Thought processes:  slow   Thought content:  Suicidal Ideation:  denies  Delusions:  no evidence of delusions  Perceptual Disturbance:  denies any perceptual disturbance  Cognition:  oriented to person, place, and time   Concentration poor  Insight poor   Judgement poor     ASSESSMENT:   Patient symptoms are:  [] Well controlled  [x] Improving  [] Worsening  [] No change      Diagnosis:   Principal Problem:    B[facility]  Resolved Problems:    * No resolved hospital problems. *      LABS:    No results for input(s): WBC, HGB, PLT in the last 72 hours. No results for input(s): NA, K, CL, CO2, BUN, CREATININE, GLUCOSE in the last 72 hours. No results for input(s): BILITOT, ALKPHOS, AST, ALT in the last 72 hours. Lab Results   Component Value Date/Time    LABAMPH Neg 12/02/2022 01:15 AM    BARBSCNU Neg 12/02/2022 01:15 AM    LABBENZ Neg 12/02/2022 01:15 AM    LABMETH Neg 12/02/2022 01:15 AM    OPIATESCREENURINE Neg 12/02/2022 01:15 AM    PHENCYCLIDINESCREENURINE Neg 12/02/2022 01:15 AM    ETOH 83 12/02/2022 07:37 AM     Lab Results   Component Value Date/Time    TSH 1.650 12/02/2022 01:20 AM     No results found for: LITHIUM  Lab Results   Component Value Date    VALPROATE 13.5 (L) 12/02/2022           Treatment Plan:  Reviewed current Medications with the patient. Medication as ordered  Risks, benefits, side effects, drug-to-drug interactions and alternatives to treatment were discussed. Collateral information:   CD evaluation  Encourage patient to attend group and other milieu activities. Discharge planning discussed with the patient and treatment team.    PSYCHOTHERAPY/COUNSELING:  [x] Therapeutic interview  [x] Supportive  [] CBT  [] Ongoing  [] Other  Patient was seen 1:1 for 20 minutes, other than E&M time spent, focusing on      - coping skills techniques     - Anxiety management techniques discussed including deep breathing exercise and PMR     - discussing patients strength and weakness      - Motivational interviewing to assess the stage of change and assessing patient readiness to quit substance use.      - Focusing on negative cognition and maladaptive thoughts, which is feeding and maintaining the depression symptoms        [x] Patient continues to need, on a daily basis, active treatment furnished directly by or requiring the supervision of inpatient psychiatric personnel      Anticipated Length of stay: tomorrow discharge            Electronically signed by Hever Leslie Bradford Angel MD on 12/5/2022 at 10:47 AM

## 2022-12-05 NOTE — GROUP NOTE
Group Therapy Note    Date: 12/4/2022    Group Start Time: 2100  Group End Time: 2125  Group Topic: Wrap-Up    [facility]    Ellyn Aden RN; Alessandra Tavarez RN    To participate in wrap up group and state whether or not goal was met. Group Therapy Note    Attendees: 9/16       Patient's Goal:  To get home soon. Notes:  Pt reports no side effects from medication and is hoping to go home soon. Status After Intervention:  Improved    Participation Level:  Active Listener and Interactive    Participation Quality: Appropriate and Attentive      Speech:  normal      Thought Process/Content: Logical      Affective Functioning: Flat      Mood: euthymic      Level of consciousness:  Alert, Oriented x4, and Attentive      Response to Learning: Able to verbalize/acknowledge new learning and Able to retain information      Endings: None Reported    Modes of Intervention: Support      Discipline Responsible: Registered Nurse      Signature:  Ellyn Aden RN

## 2022-12-05 NOTE — GROUP NOTE
Group Therapy Note    Date: 12/5/2022    Group Start Time: 1100  Group End Time: 2761  Group Topic: Cognitive Skills    [facility]    DREW Velasquez        Group Therapy Note    Attendees: 7/21       Patient's Goal:  positive reframing/ emotional recognition    Notes:  patient was cooperative and participated moderately during group session and activity. Status After Intervention:  Improved    Participation Level:  Active Listener and Interactive    Participation Quality: Appropriate      Speech:  normal      Thought Process/Content: Logical      Affective Functioning: Constricted/Restricted      Mood: depressed      Level of consciousness:  Alert, Oriented x4, and Attentive      Response to Learning: Able to verbalize/acknowledge new learning      Endings: None Reported    Modes of Intervention: Socialization, Exploration, and Activity      Discipline Responsible: /Counselor      Signature:  DREW Velasquez

## 2022-12-05 NOTE — GROUP NOTE
Group Therapy Note    Date: 12/5/2022    Group Start Time: 1000  Group End Time: 1100  Group Topic: Art Therapy     [facility] COLE Max        Group Therapy Note    Attendees: 12/20       Patient's Goal:  To participate in art therapy    Notes:  Patient actively participated    Status After Intervention:  Improved    Participation Level: Interactive    Participation Quality: Appropriate      Speech:  quiet      Thought Process/Content: Logical      Affective Functioning: Flat      Mood: depressed      Level of consciousness:  Alert      Response to Learning: Progressing to goal      Endings: None Reported    Modes of Intervention: Activity      Discipline Responsible: [address] [facility] [employer]      Signature:  Shavon Max

## 2022-12-05 NOTE — GROUP NOTE
Group Therapy Note    Date: 12/5/2022    Group Start Time: 0900  Group End Time: 0915  Group Topic: Community Meeting    YG NyW LINDSEY Pittman         Group Therapy Note    Attendees: 13/20       Morning Community Meeting Topics    Iraj Mina attended the morning community meeting on 12/5/22. Topics discussed today     [x] Introduction  Day of the week and date  Mask distribution  Current mask requirements  [x]Teams  Explanation of  Green and Blue team criteria  Nurses assigned to each team for today  Explanation about green and blue paper  Date  Patient's Name  Patient's Nurse  Goals  [x] Visitation  Announce the visiting hours for the day  Announce which team is allowed to have visitors for the day  Review any updated Covid 19 requirements for visitors during visitation  Vaccine Card or negative Covid test within 48 hours of visit  State Identification  Patients are reminded to alert the  at least 1 hour before visitation   [x] Unit Orientation  Coffee use  Phone location and etiquette  Shower locations  [place] and dryer location and process  Common area expectations  Staff rounds expectation  [x] Meals   Educate patient to the menu  The patient is encouraged to fill out the menu to get preferences at mealtime  The patient is educated that if they do not fill out the menu, they will get the standard tray  The coffee pot is decaf, patient encouraged to order regular coffee from menu.   Educate patient to the meal process  Patient encouraged to eat snacks provided twice daily  Snacks may stay in patient room     [x] Discharge Process  Discharge expectations  Fill out the survey after discharge   [x] Hygiene  Daily showers encouraged  Showers availability discussed   Daily dressing encouraged  Discussed wearing street clothing  Education provided on where to place linens and clothing  Linens in the hamper  personal clothing does not go into the linen hamper  [x] Group   Patient encouraged to attend group provided  Time of Group Meetings discussed  Gentle reminder that attendance is a Physician order  [x] Movement  Chair exercises completed  Stretching completed  Notes:   GOAL: \"get discharged\"

## 2022-12-06 VITALS
BODY MASS INDEX: 30.44 KG/M2 | HEART RATE: 57 BPM | TEMPERATURE: 98.1 F | SYSTOLIC BLOOD PRESSURE: 120 MMHG | HEIGHT: 76 IN | OXYGEN SATURATION: 96 % | DIASTOLIC BLOOD PRESSURE: 71 MMHG | RESPIRATION RATE: 20 BRPM | WEIGHT: 250 LBS

## 2022-12-06 PROCEDURE — 6370000000 HC RX 637 (ALT 250 FOR IP): Performed by: PSYCHIATRY & NEUROLOGY

## 2022-12-06 PROCEDURE — 99239 HOSP IP/OBS DSCHRG MGMT >30: CPT | Performed by: PSYCHIATRY & NEUROLOGY

## 2022-12-06 RX ORDER — HYDROXYZINE PAMOATE 50 MG/1
CAPSULE ORAL
Qty: 30 CAPSULE | Refills: 1 | Status: SHIPPED | OUTPATIENT
Start: 2022-12-06

## 2022-12-06 RX ORDER — OXCARBAZEPINE 300 MG/1
300 TABLET, FILM COATED ORAL 2 TIMES DAILY
Qty: 30 TABLET | Refills: 1 | Status: SHIPPED | OUTPATIENT
Start: 2022-12-06

## 2022-12-06 RX ORDER — AMLODIPINE BESYLATE 10 MG/1
10 TABLET ORAL DAILY
Qty: 15 TABLET | Refills: 1 | Status: SHIPPED | OUTPATIENT
Start: 2022-12-06

## 2022-12-06 RX ORDER — VALSARTAN 320 MG/1
320 TABLET ORAL DAILY
Qty: 30 TABLET | Refills: 0 | Status: SHIPPED | OUTPATIENT
Start: 2022-12-06 | End: 2023-01-05

## 2022-12-06 RX ORDER — MULTIVITAMIN WITH IRON
1 TABLET ORAL DAILY
Qty: 30 TABLET | Refills: 1 | Status: SHIPPED | OUTPATIENT
Start: 2022-12-06

## 2022-12-06 RX ORDER — PANTOPRAZOLE SODIUM 40 MG/1
40 TABLET, DELAYED RELEASE ORAL
Qty: 30 TABLET | Refills: 1 | Status: SHIPPED | OUTPATIENT
Start: 2022-12-06

## 2022-12-06 RX ORDER — TRAZODONE HYDROCHLORIDE 50 MG/1
50 TABLET ORAL NIGHTLY PRN
Qty: 15 TABLET | Refills: 1 | Status: SHIPPED | OUTPATIENT
Start: 2022-12-06

## 2022-12-06 RX ADMIN — THERA TABS 1 TABLET: TAB at 09:42

## 2022-12-06 RX ADMIN — HYDROXYZINE PAMOATE 50 MG: 50 CAPSULE ORAL at 09:42

## 2022-12-06 RX ADMIN — FOLIC ACID 1 MG: 1 TABLET ORAL at 09:41

## 2022-12-06 RX ADMIN — FLUTICASONE PROPIONATE 1 SPRAY: 50 SPRAY, METERED NASAL at 09:47

## 2022-12-06 RX ADMIN — VALSARTAN 320 MG: 160 TABLET, FILM COATED ORAL at 09:41

## 2022-12-06 RX ADMIN — LURASIDONE HYDROCHLORIDE 40 MG: 40 TABLET, FILM COATED ORAL at 09:42

## 2022-12-06 RX ADMIN — OXCARBAZEPINE 300 MG: 300 TABLET, FILM COATED ORAL at 09:41

## 2022-12-06 RX ADMIN — Medication 100 MG: at 09:41

## 2022-12-06 RX ADMIN — AMLODIPINE BESYLATE 10 MG: 10 TABLET ORAL at 09:42

## 2022-12-06 NOTE — GROUP NOTE
Group Therapy Note    Date: 12/6/2022    Group Start Time: 0900  Group End Time: 7050  Group Topic: Community Meeting    YG NyW LINDSEY Reese        Group Therapy Note    Attendees: 10/23       Morning Community Meeting Topics    Elvina Schilder attended the morning community meeting on 12/6/22. Topics discussed today     [x] Introduction  Day of the week and date  Mask distribution  Current mask requirements  [x]Teams  Explanation of  Green and Blue team criteria  Nurses assigned to each team for today  Explanation about green and blue paper  Date  Patient's Name  Patient's Nurse  Goals  [x] Visitation  Announce the visiting hours for the day  Announce which team is allowed to have visitors for the day  Review any updated Covid 19 requirements for visitors during visitation  Vaccine Card or negative Covid test within 48 hours of visit  State Identification  Patients are reminded to alert the  at least 1 hour before visitation   [x] Unit Orientation  Coffee use  Phone location and etiquette  Shower locations  [place] and dryer location and process  Common area expectations  Staff rounds expectation  [x] Meals   Educate patient to the menu  The patient is encouraged to fill out the menu to get preferences at mealtime  The patient is educated that if they do not fill out the menu, they will get the standard tray  The coffee pot is decaf, patient encouraged to order regular coffee from menu.   Educate patient to the meal process  Patient encouraged to eat snacks provided twice daily  Snacks may stay in patient room     [x] Discharge Process  Discharge expectations  Fill out the survey after discharge   [x] Hygiene  Daily showers encouraged  Showers availability discussed   Daily dressing encouraged  Discussed wearing street clothing  Education provided on where to place linens and clothing  Linens in the hamper  personal clothing does not go into the linen hamper  [x] Group   Patient encouraged to attend group provided  Time of Group Meetings discussed  Gentle reminder that attendance is a Physician order  [x] Movement  Chair exercises completed  Stretching completed  Notes:

## 2022-12-06 NOTE — PROGRESS NOTES
Pt given personal belongings. Pt left unit 1618, picked up by brother and discharged to brother's house.

## 2022-12-06 NOTE — CARE COORDINATION
Left message for patients Anamaria reynolds # [phone] asking for a return call. Patient wants to go to brothers home at discharge.

## 2022-12-06 NOTE — PROGRESS NOTES
Ct out of room watching TV. Reports HA and general anxiety along with stuffy nose. Received Tylenol 7904 and new order for Flonase at 1822. Hopeful for discharge, plans to live with a brother. Does not endorse suicidal thought.

## 2022-12-06 NOTE — CARE COORDINATION
Patients brother called and stated that patient can come to his home but the brother has to pick him up he cannot be cabbed. Brother will be here between 4:30 and 5:30pm.   There is a pistol in the home but it is being removed. Brother will work with patient on calling [facility] for a sober living placement.

## 2022-12-06 NOTE — DISCHARGE INSTRUCTIONS
Keep all follow up appointments, take medications as ordered, utilize positive supports, abstain from use of alcohol and drugs. If symptoms return or you feel at risk to yourself or others, please call 911, return the nearest emergency room, or call your local crisis hotline:  [facility]: [phone]: [phone]) [address]: [phone]     Due to the [address] [facility] is not currently available. For assistance with quitting smoking please go to https://smokefree.gov. A prescription for an FDA-approved tobacco cessation medication was offered at discharge and the patient refused. Someone from [address] [place] [address] [facility]. will be calling you tomorrow to follow up on your care. If you don't hear from us, give us a call! [phone].

## 2022-12-06 NOTE — DISCHARGE SUMMARY
DISCHARGE SUMMARY      Patient ID:  Cedric Rousseau  [id number]  36 y.o.  1980      Admit date: 2022    Discharge date and time: 2022    Admitting Physician: Aries Quintero MD     Discharge Physician: Dr Coni Collier MD    Admission Diagnoses: Bipolar depression ([facility] [F31.9]  Bipolar 1 disorder ([facility] [F31.9]    Admission Condition: poor    Discharged Condition: stable    Admission Circumstance:       Patient is a 45yo with a history of biploar depression and substance abuse reported to the ED for suicidal thoughts with a plan to overdose on pills. Patient grew up with his parents (both ) and brother. Graduated High School in [place], attended some trade school at [employer]. Never , three children (16yo,  24 yo and daughter  at 19yo). Patient has a long term struggle with addiction. He has been in treatment at [facility] for detox, [facility] and other treatment facilities. He has been unable to maintain sobriety for any length of time. 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 Patient reports depression and bipolar. He lost his 24year old daughter in July from a drug overdose and \"I can't get my head straight ever since\"  Does not have stable housing \" I'm mayur homeless, I guess\"      Patient is a 45yo with a history of biploar depression and substance abuse reported to the ED for suicidal thoughts with a plan to overdose on pills. Was intoxicated on arrival. Once sober, patient continues to have suicidal thoughts with a plan to overdose. He presents depressed, quiet, clear and cooperative. Reports feeling anxious. Denies HI, A/V hallucinations. Feels \" odd and foggy\" this morning. Reports drinking a fifth of vodka a day and recently taking a variety of pills - Percocet, Morphine and Xanax.  Does not use pills daily, \"these just came available to me, so I have been taking them\"        Reports multiple stressors - limited employment, substance abuse which has been exacerbated by grief from losing his 25 yo daughter to an overdose in July. Patient is complaint with his HTN medications but inconsistent with Bipolar medications because they make him \"too tired for work and they don't really work anyway\" He iis open to treatment. Would like to go to a long term facility after discharge. HISTORY OF PRESENT ILLNESS:       The patient is a 36 y.o. male single currently homeless for a week, was living with ex luis with significant past history of bipolar depression, alcoholism     Pt has been feeling depressed chronically, but recently it has been worse  Duration: 2-3 months  Severity: Rating mood to be around 2/10 (10- good)  Quality:melancholic  Worse all day  Content: Hopeless, worthless and helpless feeling  Suicidal thoughts - want to take overdose  Associated symptoms:  Poor concentration, anhedonia, decrease motivation  Sleep and appetite- poor     Pt quit drinking after last admit in May. Relapsed with alcohol use few months ago, drink once a week, a fifth of vodka. last drink 2 days ago. Was also abusing percocet, xanax whenever he get it     [facility] her 23 y/o daughter in July for heart problems. Live with brother. Relationship issue, unemployed     The patient is currently receiving care for the above psychiatric illness.      Medications Prior to Admission:     Prescriptions Prior to Admission   Medications Prior to Admission: valsartan (DIOVAN) 320 MG tablet, Take 1 tablet by mouth daily  QUEtiapine (SEROQUEL) 300 MG tablet, Take 1 tablet by mouth nightly  amLODIPine (NORVASC) 10 MG tablet, Take 1 tablet by mouth daily  divalproex (DEPAKOTE) 250 MG DR tablet, Take 1 tablet by mouth every 8 hours  hydrOXYzine pamoate (VISTARIL) 50 MG capsule, TAKE 1 CAPSULE BY MOUTH TWICE DAILY AS NEEDED FOR ANXIETY  pantoprazole (PROTONIX) 40 MG tablet, Take 1 tablet by mouth every morning (before breakfast)        Compliance:no     Psychiatric Review of Systems       Depression: yes     Keeley or Hypomania:  no      Panic Attacks:  yes      Phobias:  no     Obsessions and Compulsions:  no     PTSD : no     Hallucinations:  no     Delusions:  no        Past Psychiatric History:  Prior Diagnosis:  bipolar, alcohlism  Psychiatrist: no  Therapist:no  Hospitalization: yes  Hx of Suicidal Attempts: yes  Hx of violence:  yes  ECT: no  Previous discontinued Psychiatric Med Trials: celexa, zoloft, buspar    PAST MEDICAL/PSYCHIATRIC HISTORY:   Past Medical History:   Diagnosis Date    Depression     Hypertension 2015    trx with pills x 1 month / patient did not follow up    Smoker        FAMILY/SOCIAL HISTORY:  Family History   Problem Relation Age of Onset    High Blood Pressure Mother     Heart Disease Mother     Other Mother         copd / smoker    Stroke Father     High Blood Pressure Brother     Other Brother         anxiety     Social History     Socioeconomic History    Marital status: Single     Spouse name: Not on file    Number of children: Not on file    Years of education: Not on file    Highest education level: Not on file   Occupational History    Not on file   Tobacco Use    Smoking status: Every Day     Packs/day: 1.00     Years: 20.00     Pack years: 20.00     Types: Cigarettes    Smokeless tobacco: Never   Substance and Sexual Activity    Alcohol use: Not Currently     Comment: 5th of vodka a day    Drug use: Yes     Types: Marijuana (Omi Nolasco), Opiates     Sexual activity: Not on file   Other Topics Concern    Not on file   Social History Narrative    Not on file     Social Determinants of Health     Financial Resource Strain: Low Risk     Difficulty of Paying Living Expenses: Not hard at all   Food Insecurity: No Food Insecurity    Worried About Running Out of Food in the Last Year: Never true    Ran Out of Food in the Last Year: Never true   Transportation Needs: Not on file   Physical Activity: Not on file   Stress: Not on file   Social Connections: Not on file   Intimate Partner Violence: Not on file   Housing Stability: Not on file       MEDICATIONS:    Current Facility-Administered Medications:     fluticasone (FLONASE) 50 MCG/ACT nasal spray 1 spray, 1 spray, Each Nostril, Daily, Megan Richmond DO, 1 spray at 12/05/22 1822    OXcarbazepine (TRILEPTAL) tablet 300 mg, 300 mg, Oral, BID, Eber Kaba MD, 300 mg at 12/05/22 2100    lurasidone (LATUDA) tablet 40 mg, 40 mg, Oral, Daily, Eber Kaba MD, 40 mg at 12/05/22 0830    amLODIPine (NORVASC) tablet 10 mg, 10 mg, Oral, Daily, Eber Kaba MD, 10 mg at 12/05/22 0830    valsartan (DIOVAN) tablet 320 mg, 320 mg, Oral, Daily, Eber Kaba MD, 320 mg at 12/05/22 0830    acetaminophen (TYLENOL) tablet 650 mg, 650 mg, Oral, Q4H PRN, Eber Kaba MD, 650 mg at 12/05/22 1641    magnesium hydroxide (MILK OF MAGNESIA) 400 MG/5ML suspension 30 mL, 30 mL, Oral, Daily PRN, Eber Kaba MD    aluminum & magnesium hydroxide-simethicone (MAALOX) 200-200-20 MG/5ML suspension 30 mL, 30 mL, Oral, PRN, Eber Kaba MD    haloperidol (HALDOL) tablet 5 mg, 5 mg, Oral, Q6H PRN **OR** haloperidol lactate (HALDOL) injection 5 mg, 5 mg, IntraMUSCular, Q6H PRN, Eber Kaba MD    benztropine mesylate (COGENTIN) injection 2 mg, 2 mg, IntraMUSCular, BID PRN, Eber Kaba MD    hydrOXYzine pamoate (VISTARIL) capsule 50 mg, 50 mg, Oral, Q6H PRN, 50 mg at 12/05/22 2102 **OR** hydrOXYzine (VISTARIL) injection 50 mg, 50 mg, IntraMUSCular, Q6H PRN, Eber Kaba MD    traZODone (DESYREL) tablet 50 mg, 50 mg, Oral, Nightly PRN, Eber Kaba MD, 50 mg at 71/71/24 6679    folic acid (FOLVITE) tablet 1 mg, 1 mg, Oral, Daily, Eber Kaba MD, 1 mg at 12/05/22 0830    multivitamin 1 tablet, 1 tablet, Oral, Daily, Eber Kaba MD, 1 tablet at 12/05/22 0830    chlordiazePOXIDE (LIBRIUM) capsule 10 mg, 10 mg, Oral, Q4H PRN **OR** chlordiazePOXIDE (LIBRIUM) capsule 25 mg, 25 mg, Oral, Q4H PRN, 25 mg at 12/02/22 2054 **OR** chlordiazePOXIDE (LIBRIUM) capsule 50 mg, 50 mg, Oral, Q2H PRN **OR** chlordiazePOXIDE (LIBRIUM) capsule 75 mg, 75 mg, Oral, Q1H PRN **OR** LORazepam (ATIVAN) injection 4 mg, 4 mg, IntraMUSCular, Q1H PRN, Fletcher Malave MD    thiamine tablet 100 mg, 100 mg, Oral, Daily, Fletcher Malave MD, 100 mg at 12/05/22 1416    Examination:  /82   Pulse 60   Temp 99 °F (37.2 °C)   Resp 20   Ht 6' 4\" (1.93 m)   Wt 250 lb (113.4 kg)   SpO2 96%   BMI 30.43 kg/m²   Gait - steady    HOSPITAL COURSE[de-identified]  Following admission to the hospital, patient had a complete physical exam and blood work up  Patient was monitored closely with suicide precaution  Patient was started on meds as listed below  Was encouraged to participate in group and other milieu activity  Patient started to feel better with this combination of treatment. Significant progress in the symptoms since admission. Mood better, with the score of 2/10 - bad  No AVH or paranoid thoughts  N oHopeless or worthless feeling  No active SI/HI  Appetite:  [x] Normal  [] Increased  [] Decreased    Sleep:       [x] Normal  [] Fair       [] Poor            Energy:    [x] Normal  [] Increased  [] Decreased     SI [] Present  [x] Absent  HI  []Present  [x] Absent   Aggression:  [] yes  [] no  Patient is [x] able  [] unable to CONTRACT FOR SAFETY   Medication side effects(SE):  [x] None(Psych.  Meds.) [] Other      Mental Status Examination on discharge:    Level of consciousness:  within normal limits   Appearance:  well-appearing  Behavior/Motor:  no abnormalities noted  Attitude toward examiner:  attentive and good eye contact  Speech:  spontaneous, normal rate and normal volume   Mood: euthymic  Affect:  mood congruent  Thought processes:  coherent   Thought content:  Suicidal Ideation:  denies suicidal ideation  Delusions:  no evidence of delusions  Perceptual Disturbance:  denies any perceptual disturbance  Cognition:  oriented to person, place, and time   Concentration intact  Memory intact  Insight good   Judgement fair   Fund of Knowledge adequate      ASSESSMENT:  Patient symptoms are:  [x] Well controlled  [x] Improving  [] Worsening  [] No change      Diagnosis:  Principal Problem:    Bipolar depression ([facility]  Resolved Problems:    * No resolved hospital problems. *  Alcohol use disorder    LABS:    No results for input(s): WBC, HGB, PLT in the last 72 hours. No results for input(s): NA, K, CL, CO2, BUN, CREATININE, GLUCOSE in the last 72 hours. No results for input(s): BILITOT, ALKPHOS, AST, ALT in the last 72 hours. Lab Results   Component Value Date/Time    LABAMPH Neg 12/02/2022 01:15 AM    BARBSCNU Neg 12/02/2022 01:15 AM    LABBENZ Neg 12/02/2022 01:15 AM    LABMETH Neg 12/02/2022 01:15 AM    OPIATESCREENURINE Neg 12/02/2022 01:15 AM    PHENCYCLIDINESCREENURINE Neg 12/02/2022 01:15 AM    ETOH 83 12/02/2022 07:37 AM     Lab Results   Component Value Date/Time    TSH 1.650 12/02/2022 01:20 AM     No results found for: LITHIUM  Lab Results   Component Value Date    VALPROATE 13.5 (L) 12/02/2022       RISK ASSESSMENT AT DISCHARGE: Low risk for suicide and homicide. Treatment Plan:  Reviewed current Medications with the patient. Education provided on the complaince with treatment. Risks, benefits, side effects, drug-to-drug interactions and alternatives to treatment were discussed. Encourage patient to attend outpatient follow up appointment and therapy. Patient was advised to call the outpatient provider, visit the nearest ED or call 911 if symptoms are not manageable. Patient's family member was contacted prior to the discharge.          Medication List        ASK your doctor about these medications      amLODIPine 10 MG tablet  Commonly known as: NORVASC  Take 1 tablet by mouth daily     divalproex 250 MG DR tablet  Commonly known as: DEPAKOTE  Take 1 tablet by mouth every 8 hours     hydrOXYzine pamoate 50 MG capsule  Commonly known as: VISTARIL  TAKE 1 CAPSULE BY MOUTH TWICE DAILY AS NEEDED FOR ANXIETY     pantoprazole 40 MG tablet  Commonly known as: PROTONIX  Take 1 tablet by mouth every morning (before breakfast)     QUEtiapine 300 MG tablet  Commonly known as: SEROQUEL  Take 1 tablet by mouth nightly     valsartan 320 MG tablet  Commonly known as: DIOVAN  Take 1 tablet by mouth daily                Reason for more than one antipsychotic:   [x] N/A  [] 3 failed monotherapy(drugs tried):  [] Cross over to a new antipsychotic  [] Taper to monotherapy from polypharmacy  [] Augmentation of Clozapine therapy due to treatment resistance to single therapy        TIME SPEND - 35 MINUTES TO COMPLETE THE EVALUATION, DISCHARGE SUMMARY, MEDICATION RECONCILIATION AND FOLLOW UP CARE     Signed:  Katherine Alvares MD  12/6/2022  9:16 AM

## 2022-12-06 NOTE — FLOWSHEET NOTE
Reviewed discharge instructions with patient. Pt. Verbalized understanding. Denies SI/HI/AVH. Awaiting brother to transport.

## 2022-12-06 NOTE — PLAN OF CARE
Pt up on unit this evening watching tv. Pt given Trazodone for sleep at 2120 and  Vistaril at 2102 for anxiety. Will con't to monitor.

## 2022-12-06 NOTE — GROUP NOTE
Group Therapy Note    Date: 12/5/2022    Group Start Time: 1845  Group End Time: 1900  Group Topic: Wrap-Up    [facility]    Andreina Huffman RN        Group Therapy Note    Attendees: 10/19       Patient's Goal:  To be discharged    Notes:  Progressing    Status After Intervention:  Unchanged    Participation Level:  Active Listener    Participation Quality: Appropriate      Speech:  normal      Thought Process/Content: Logical      Affective Functioning: Congruent      Mood: euthymic      Level of consciousness:  Alert      Response to Learning: Progressing to goal      Endings: None Reported    Modes of Intervention: Support      Discipline Responsible: Registered Nurse      Signature:  Andreina Huffman RN

## 2022-12-06 NOTE — DISCHARGE INSTR - DIET

## 2023-01-06 RX ORDER — OXCARBAZEPINE 300 MG/1
300 TABLET, FILM COATED ORAL 2 TIMES DAILY
Qty: 30 TABLET | Refills: 1 | Status: SHIPPED | OUTPATIENT
Start: 2023-01-06

## 2023-01-06 RX ORDER — VALSARTAN 320 MG/1
320 TABLET ORAL DAILY
Qty: 30 TABLET | Refills: 1 | Status: SHIPPED | OUTPATIENT
Start: 2023-01-06 | End: 2023-02-05

## 2023-01-06 RX ORDER — AMLODIPINE BESYLATE 10 MG/1
10 TABLET ORAL DAILY
Qty: 15 TABLET | Refills: 1 | Status: SHIPPED | OUTPATIENT
Start: 2023-01-06

## 2023-01-20 RX ORDER — TRAZODONE HYDROCHLORIDE 50 MG/1
50 TABLET ORAL NIGHTLY PRN
Qty: 15 TABLET | Refills: 1 | OUTPATIENT
Start: 2023-01-20

## 2023-01-20 RX ORDER — LURASIDONE HYDROCHLORIDE 40 MG/1
TABLET, FILM COATED ORAL
Qty: 15 TABLET | Refills: 1 | OUTPATIENT
Start: 2023-01-20

## 2023-02-07 RX ORDER — OXCARBAZEPINE 300 MG/1
TABLET, FILM COATED ORAL
Qty: 30 TABLET | Refills: 1 | OUTPATIENT
Start: 2023-02-07

## 2023-02-07 RX ORDER — LURASIDONE HYDROCHLORIDE 40 MG/1
TABLET, FILM COATED ORAL
Qty: 15 TABLET | Refills: 2 | OUTPATIENT
Start: 2023-02-07

## 2023-02-07 RX ORDER — AMLODIPINE BESYLATE 10 MG/1
TABLET ORAL
Qty: 15 TABLET | Refills: 1 | OUTPATIENT
Start: 2023-02-07

## 2023-02-08 RX ORDER — AMLODIPINE BESYLATE 10 MG/1
TABLET ORAL
Qty: 15 TABLET | Refills: 1 | OUTPATIENT
Start: 2023-02-08

## 2023-02-11 ENCOUNTER — HOSPITAL ENCOUNTER (INPATIENT)
Age: 43
DRG: 753 | End: 2023-02-11
Attending: EMERGENCY MEDICINE | Admitting: PSYCHIATRY & NEUROLOGY
Payer: COMMERCIAL

## 2023-02-11 DIAGNOSIS — F31.9 BIPOLAR 1 DISORDER, DEPRESSED (HCC): Primary | ICD-10-CM

## 2023-02-11 LAB
ACETAMINOPHEN LEVEL: <5 UG/ML (ref 10–30)
ALBUMIN SERPL-MCNC: 4.3 G/DL (ref 3.5–4.6)
ALP BLD-CCNC: 108 U/L (ref 35–104)
ALT SERPL-CCNC: 15 U/L (ref 0–41)
AMPHETAMINE SCREEN, URINE: ABNORMAL
ANION GAP SERPL CALCULATED.3IONS-SCNC: 10 MEQ/L (ref 9–15)
ANION GAP SERPL CALCULATED.3IONS-SCNC: 13 MEQ/L (ref 9–15)
AST SERPL-CCNC: 16 U/L (ref 0–40)
BARBITURATE SCREEN URINE: ABNORMAL
BASOPHILS ABSOLUTE: 0 K/UL (ref 0–0.2)
BASOPHILS RELATIVE PERCENT: 0.5 %
BENZODIAZEPINE SCREEN, URINE: ABNORMAL
BILIRUB SERPL-MCNC: <0.2 MG/DL (ref 0.2–0.7)
BILIRUBIN URINE: NEGATIVE
BLOOD, URINE: NEGATIVE
BUN BLDV-MCNC: 11 MG/DL (ref 6–20)
BUN BLDV-MCNC: 12 MG/DL (ref 6–20)
CALCIUM SERPL-MCNC: 9.1 MG/DL (ref 8.5–9.9)
CALCIUM SERPL-MCNC: 9.3 MG/DL (ref 8.5–9.9)
CANNABINOID SCREEN URINE: ABNORMAL
CHLORIDE BLD-SCNC: 108 MEQ/L (ref 95–107)
CHLORIDE BLD-SCNC: 108 MEQ/L (ref 95–107)
CHOLESTEROL, TOTAL: 182 MG/DL (ref 0–199)
CLARITY: CLEAR
CO2: 26 MEQ/L (ref 20–31)
CO2: 28 MEQ/L (ref 20–31)
COCAINE METABOLITE SCREEN URINE: POSITIVE
COLOR: YELLOW
CREAT SERPL-MCNC: 0.87 MG/DL (ref 0.7–1.2)
CREAT SERPL-MCNC: 0.97 MG/DL (ref 0.7–1.2)
EOSINOPHILS ABSOLUTE: 0.1 K/UL (ref 0–0.7)
EOSINOPHILS RELATIVE PERCENT: 0.9 %
ETHANOL PERCENT: 0.07 G/DL
ETHANOL PERCENT: 0.18 G/DL
ETHANOL: 207 MG/DL (ref 0–0.08)
ETHANOL: 82 MG/DL (ref 0–0.08)
FENTANYL SCREEN, URINE: ABNORMAL
GFR SERPL CREATININE-BSD FRML MDRD: >60 ML/MIN/{1.73_M2}
GFR SERPL CREATININE-BSD FRML MDRD: >60 ML/MIN/{1.73_M2}
GLOBULIN: 2.2 G/DL (ref 2.3–3.5)
GLUCOSE BLD-MCNC: 103 MG/DL (ref 70–99)
GLUCOSE BLD-MCNC: 122 MG/DL (ref 70–99)
GLUCOSE URINE: NEGATIVE MG/DL
HCT VFR BLD CALC: 42.9 % (ref 42–52)
HDLC SERPL-MCNC: 33 MG/DL (ref 40–59)
HEMOGLOBIN: 14.7 G/DL (ref 14–18)
KETONES, URINE: ABNORMAL MG/DL
LDL CHOLESTEROL CALCULATED: 82 MG/DL (ref 0–129)
LEUKOCYTE ESTERASE, URINE: NEGATIVE
LYMPHOCYTES ABSOLUTE: 3 K/UL (ref 1–4.8)
LYMPHOCYTES RELATIVE PERCENT: 42.8 %
Lab: ABNORMAL
MCH RBC QN AUTO: 31.4 PG (ref 27–31.3)
MCHC RBC AUTO-ENTMCNC: 34.2 % (ref 33–37)
MCV RBC AUTO: 91.7 FL (ref 79–92.2)
METHADONE SCREEN, URINE: ABNORMAL
MONOCYTES ABSOLUTE: 0.4 K/UL (ref 0.2–0.8)
MONOCYTES RELATIVE PERCENT: 5 %
NEUTROPHILS ABSOLUTE: 3.6 K/UL (ref 1.4–6.5)
NEUTROPHILS RELATIVE PERCENT: 50.8 %
NITRITE, URINE: NEGATIVE
OPIATE SCREEN URINE: ABNORMAL
OXYCODONE URINE: ABNORMAL
PDW BLD-RTO: 13.8 % (ref 11.5–14.5)
PH UA: 6.5 (ref 5–9)
PHENCYCLIDINE SCREEN URINE: ABNORMAL
PLATELET # BLD: 205 K/UL (ref 130–400)
POTASSIUM SERPL-SCNC: 3.7 MEQ/L (ref 3.4–4.9)
POTASSIUM SERPL-SCNC: 4 MEQ/L (ref 3.4–4.9)
PROPOXYPHENE SCREEN: ABNORMAL
PROTEIN UA: NEGATIVE MG/DL
RBC # BLD: 4.68 M/UL (ref 4.7–6.1)
SALICYLATE, SERUM: <0.3 MG/DL (ref 15–30)
SARS-COV-2, NAAT: NOT DETECTED
SODIUM BLD-SCNC: 146 MEQ/L (ref 135–144)
SODIUM BLD-SCNC: 147 MEQ/L (ref 135–144)
SPECIFIC GRAVITY UA: 1.02 (ref 1–1.03)
TOTAL CK: 97 U/L (ref 0–190)
TOTAL PROTEIN: 6.5 G/DL (ref 6.3–8)
TRIGL SERPL-MCNC: 333 MG/DL (ref 0–150)
TSH SERPL DL<=0.05 MIU/L-ACNC: 0.81 UIU/ML (ref 0.44–3.86)
URINE REFLEX TO CULTURE: ABNORMAL
UROBILINOGEN, URINE: 0.2 E.U./DL
WBC # BLD: 7.1 K/UL (ref 4.8–10.8)

## 2023-02-11 PROCEDURE — 80179 DRUG ASSAY SALICYLATE: CPT

## 2023-02-11 PROCEDURE — 6370000000 HC RX 637 (ALT 250 FOR IP): Performed by: PSYCHIATRY & NEUROLOGY

## 2023-02-11 PROCEDURE — 82550 ASSAY OF CK (CPK): CPT

## 2023-02-11 PROCEDURE — 82077 ASSAY SPEC XCP UR&BREATH IA: CPT

## 2023-02-11 PROCEDURE — 87635 SARS-COV-2 COVID-19 AMP PRB: CPT

## 2023-02-11 PROCEDURE — 99285 EMERGENCY DEPT VISIT HI MDM: CPT

## 2023-02-11 PROCEDURE — 6370000000 HC RX 637 (ALT 250 FOR IP): Performed by: EMERGENCY MEDICINE

## 2023-02-11 PROCEDURE — 80061 LIPID PANEL: CPT

## 2023-02-11 PROCEDURE — 80143 DRUG ASSAY ACETAMINOPHEN: CPT

## 2023-02-11 PROCEDURE — 81003 URINALYSIS AUTO W/O SCOPE: CPT

## 2023-02-11 PROCEDURE — 1240000000 HC EMOTIONAL WELLNESS R&B

## 2023-02-11 PROCEDURE — 84443 ASSAY THYROID STIM HORMONE: CPT

## 2023-02-11 PROCEDURE — 36415 COLL VENOUS BLD VENIPUNCTURE: CPT

## 2023-02-11 PROCEDURE — 80053 COMPREHEN METABOLIC PANEL: CPT

## 2023-02-11 PROCEDURE — 80307 DRUG TEST PRSMV CHEM ANLYZR: CPT

## 2023-02-11 PROCEDURE — 85025 COMPLETE CBC W/AUTO DIFF WBC: CPT

## 2023-02-11 RX ORDER — TRAZODONE HYDROCHLORIDE 50 MG/1
50 TABLET ORAL NIGHTLY PRN
Status: DISCONTINUED | OUTPATIENT
Start: 2023-02-11 | End: 2023-02-24 | Stop reason: HOSPADM

## 2023-02-11 RX ORDER — CHLORDIAZEPOXIDE HYDROCHLORIDE 25 MG/1
75 CAPSULE, GELATIN COATED ORAL
Status: DISCONTINUED | OUTPATIENT
Start: 2023-02-11 | End: 2023-02-17

## 2023-02-11 RX ORDER — CHLORDIAZEPOXIDE HYDROCHLORIDE 25 MG/1
50 CAPSULE, GELATIN COATED ORAL ONCE
Status: COMPLETED | OUTPATIENT
Start: 2023-02-11 | End: 2023-02-11

## 2023-02-11 RX ORDER — BENZTROPINE MESYLATE 1 MG/ML
2 INJECTION INTRAMUSCULAR; INTRAVENOUS 2 TIMES DAILY PRN
Status: DISCONTINUED | OUTPATIENT
Start: 2023-02-11 | End: 2023-02-24 | Stop reason: HOSPADM

## 2023-02-11 RX ORDER — HALOPERIDOL 5 MG/1
5 TABLET ORAL EVERY 6 HOURS PRN
Status: DISCONTINUED | OUTPATIENT
Start: 2023-02-11 | End: 2023-02-24 | Stop reason: HOSPADM

## 2023-02-11 RX ORDER — MAGNESIUM HYDROXIDE/ALUMINUM HYDROXICE/SIMETHICONE 120; 1200; 1200 MG/30ML; MG/30ML; MG/30ML
30 SUSPENSION ORAL PRN
Status: DISCONTINUED | OUTPATIENT
Start: 2023-02-11 | End: 2023-02-24 | Stop reason: HOSPADM

## 2023-02-11 RX ORDER — CHLORDIAZEPOXIDE HYDROCHLORIDE 25 MG/1
25 CAPSULE, GELATIN COATED ORAL EVERY 4 HOURS PRN
Status: DISCONTINUED | OUTPATIENT
Start: 2023-02-11 | End: 2023-02-17

## 2023-02-11 RX ORDER — CHLORDIAZEPOXIDE HYDROCHLORIDE 5 MG/1
10 CAPSULE, GELATIN COATED ORAL EVERY 4 HOURS PRN
Status: DISCONTINUED | OUTPATIENT
Start: 2023-02-11 | End: 2023-02-17

## 2023-02-11 RX ORDER — LANOLIN ALCOHOL/MO/W.PET/CERES
100 CREAM (GRAM) TOPICAL DAILY
Status: DISCONTINUED | OUTPATIENT
Start: 2023-02-12 | End: 2023-02-24 | Stop reason: HOSPADM

## 2023-02-11 RX ORDER — HYDROXYZINE HYDROCHLORIDE 50 MG/ML
50 INJECTION, SOLUTION INTRAMUSCULAR EVERY 6 HOURS PRN
Status: DISCONTINUED | OUTPATIENT
Start: 2023-02-11 | End: 2023-02-12

## 2023-02-11 RX ORDER — ACETAMINOPHEN 325 MG/1
650 TABLET ORAL EVERY 4 HOURS PRN
Status: DISCONTINUED | OUTPATIENT
Start: 2023-02-11 | End: 2023-02-24 | Stop reason: HOSPADM

## 2023-02-11 RX ORDER — CHLORDIAZEPOXIDE HYDROCHLORIDE 25 MG/1
50 CAPSULE, GELATIN COATED ORAL
Status: DISCONTINUED | OUTPATIENT
Start: 2023-02-11 | End: 2023-02-17

## 2023-02-11 RX ORDER — FOLIC ACID 1 MG/1
1 TABLET ORAL DAILY
Status: DISCONTINUED | OUTPATIENT
Start: 2023-02-12 | End: 2023-02-24 | Stop reason: HOSPADM

## 2023-02-11 RX ORDER — MULTIVITAMIN WITH IRON
1 TABLET ORAL DAILY
Status: DISCONTINUED | OUTPATIENT
Start: 2023-02-12 | End: 2023-02-24 | Stop reason: HOSPADM

## 2023-02-11 RX ORDER — AMLODIPINE BESYLATE 10 MG/1
10 TABLET ORAL DAILY
Status: DISCONTINUED | OUTPATIENT
Start: 2023-02-12 | End: 2023-02-24 | Stop reason: HOSPADM

## 2023-02-11 RX ORDER — OXCARBAZEPINE 300 MG/1
300 TABLET, FILM COATED ORAL 2 TIMES DAILY
Status: DISCONTINUED | OUTPATIENT
Start: 2023-02-11 | End: 2023-02-12

## 2023-02-11 RX ORDER — LORAZEPAM 2 MG/ML
4 INJECTION INTRAMUSCULAR
Status: DISCONTINUED | OUTPATIENT
Start: 2023-02-11 | End: 2023-02-17

## 2023-02-11 RX ORDER — HALOPERIDOL 5 MG/ML
5 INJECTION INTRAMUSCULAR EVERY 6 HOURS PRN
Status: DISCONTINUED | OUTPATIENT
Start: 2023-02-11 | End: 2023-02-24 | Stop reason: HOSPADM

## 2023-02-11 RX ADMIN — CHLORDIAZEPOXIDE HYDROCHLORIDE 50 MG: 25 CAPSULE ORAL at 15:35

## 2023-02-11 RX ADMIN — OXCARBAZEPINE 300 MG: 300 TABLET, FILM COATED ORAL at 23:28

## 2023-02-11 RX ADMIN — TRAZODONE HYDROCHLORIDE 50 MG: 50 TABLET ORAL at 23:27

## 2023-02-11 ASSESSMENT — ENCOUNTER SYMPTOMS
VOMITING: 0
SORE THROAT: 0
SHORTNESS OF BREATH: 0
ABDOMINAL PAIN: 0
DIARRHEA: 0
BACK PAIN: 0
COUGH: 0
NAUSEA: 0

## 2023-02-11 ASSESSMENT — LIFESTYLE VARIABLES
HOW MANY STANDARD DRINKS CONTAINING ALCOHOL DO YOU HAVE ON A TYPICAL DAY: 10 OR MORE
HOW MANY STANDARD DRINKS CONTAINING ALCOHOL DO YOU HAVE ON A TYPICAL DAY: 10 OR MORE
HOW OFTEN DO YOU HAVE A DRINK CONTAINING ALCOHOL: 2-4 TIMES A MONTH
HOW OFTEN DO YOU HAVE A DRINK CONTAINING ALCOHOL: 2-4 TIMES A MONTH

## 2023-02-11 ASSESSMENT — PATIENT HEALTH QUESTIONNAIRE - PHQ9: SUM OF ALL RESPONSES TO PHQ QUESTIONS 1-9: 27

## 2023-02-11 ASSESSMENT — PAIN - FUNCTIONAL ASSESSMENT: PAIN_FUNCTIONAL_ASSESSMENT: NONE - DENIES PAIN

## 2023-02-11 NOTE — ED NOTES
Dr Dana Hercules to bedside for head to toe exam.  Contacted pharmacy and they will fax medication list.     Sid Nelson.  [facility], [address] [place] [address]  02/11/23 6105

## 2023-02-11 NOTE — ED NOTES
Ate 90% of meal. Awake, alert. Registration at bedside. Kiley Romero  [facility], [address] [place] [address]  02/11/23 5905

## 2023-02-11 NOTE — ED NOTES
Lab notified of need for draw. Advanced practitioner notified of need for head to toe assessment. Patient changed into hospital clothes. David Rn preformed skin check. Reba Ordonez  02/11/23 8762

## 2023-02-11 NOTE — ED NOTES
2nd call to lab for draw. Catrina Llanos  [facility]  02/11/23 6408

## 2023-02-11 NOTE — ED PROVIDER NOTES
[facility]  eMERGENCYdEPARTMENT eNCOUnter      Pt Name: Sandra Flanagan  MRN: [id number]  [place] 1980  Date of evaluation: 2/11/2023  Theresa Navarro MD    CHIEF COMPLAINT           HPI  Sandra Flanagan is a 36 y.o. male per chart review has a h/o depression/anxiety, HTN presents to the ED with depression, SI.  Pt notes gradual onset, moderate, constant, worsening depression x 3 weeks. +SI. Pt denies HI, AVH. Pt denies fever, n/v, cp, sob, ab pain, dysuria, diarrhea. ROS  Review of Systems   Constitutional:  Negative for activity change, chills and fever. HENT:  Negative for ear pain and sore throat. Eyes:  Negative for visual disturbance. Respiratory:  Negative for cough and shortness of breath. Cardiovascular:  Negative for chest pain, palpitations and leg swelling. Gastrointestinal:  Negative for abdominal pain, diarrhea, nausea and vomiting. Genitourinary:  Negative for dysuria. Musculoskeletal:  Negative for back pain. Skin:  Negative for rash. Neurological:  Negative for dizziness and weakness. Psychiatric/Behavioral:  Positive for decreased concentration, dysphoric mood, self-injury and suicidal ideas. Except as noted above the remainder of the review of systems was reviewed and negative.        PAST MEDICAL HISTORY     Past Medical History:   Diagnosis Date    Depression     Hypertension 2015    trx with pills x 1 month / patient did not follow up    Smoker          SURGICAL HISTORY       Past Surgical History:   Procedure Laterality Date    AK CREATE EARDRUM OPENING,GEN ANESTH Bilateral 2/8/2018    BILATERAL MYRINGOTOMY WITH VENTILING TUBE INSERTION (PAT DONE 1/22/18) performed by Yuri Sinclair MD at [facility]       Current Discharge Medication List        CONTINUE these medications which have NOT CHANGED    Details   amLODIPine (NORVASC) 10 MG tablet Take 1 tablet by mouth daily  Qty: 15 tablet, Refills: 1 valsartan (DIOVAN) 320 MG tablet Take 1 tablet by mouth daily  Qty: 30 tablet, Refills: 1      OXcarbazepine (TRILEPTAL) 300 MG tablet Take 1 tablet by mouth 2 times daily  Qty: 30 tablet, Refills: 1      traZODone (DESYREL) 50 MG tablet Take 1 tablet by mouth nightly as needed for Sleep  Qty: 15 tablet, Refills: 1      Multiple Vitamin (MULTIVITAMIN) TABS tablet Take 1 tablet by mouth daily  Qty: 30 tablet, Refills: 1      pantoprazole (PROTONIX) 40 MG tablet Take 1 tablet by mouth every morning (before breakfast)  Qty: 30 tablet, Refills: 1      lurasidone (LATUDA) 40 MG TABS tablet Take 1 tablet by mouth daily  Qty: 15 tablet, Refills: 2             ALLERGIES     Patient has no known allergies. FAMILY HISTORY       Family History   Problem Relation Age of Onset    High Blood Pressure Mother     Heart Disease Mother     Other Mother         copd / smoker    Stroke Father     High Blood Pressure Brother     Other Brother         anxiety          SOCIAL HISTORY       Social History     Socioeconomic History    Marital status: Single   Tobacco Use    Smoking status: Every Day     Packs/day: 1.00     Years: 20.00     Pack years: 20.00     Types: Cigarettes    Smokeless tobacco: Never   Substance and Sexual Activity    Alcohol use: Not Currently     Comment: 5th of vodka a day    Drug use: Yes     Types: Marijuana ([place]estine Lick), Opiates      Social Determinants of Health     Financial Resource Strain: Low Risk     Difficulty of Paying Living Expenses: Not hard at all   Food Insecurity: No Food Insecurity    Worried About [address] in the Last Year: Never true    Ran Out of Food in the Last Year: Never true         PHYSICAL EXAM       ED Triage Vitals [02/11/23 1119]   BP Temp Temp Source Heart Rate Resp SpO2 Height Weight   130/85 98.4 °F (36.9 °C) Oral 76 18 97 % 6' 4\" (1.93 m) 250 lb (113.4 kg)       Physical Exam  Vitals and nursing note reviewed. Constitutional:       Appearance: He is well-developed. HENT:      Head: Normocephalic. Right Ear: External ear normal.      Left Ear: External ear normal.   Eyes:      Conjunctiva/sclera: Conjunctivae normal.      Pupils: Pupils are equal, round, and reactive to light. Cardiovascular:      Rate and Rhythm: Normal rate and regular rhythm. Heart sounds: Normal heart sounds. Pulmonary:      Effort: Pulmonary effort is normal.      Breath sounds: Normal breath sounds. Abdominal:      General: Bowel sounds are normal. There is no distension. Palpations: Abdomen is soft. Tenderness: There is no abdominal tenderness. Musculoskeletal:         General: Normal range of motion. Cervical back: Normal range of motion and neck supple. Skin:     General: Skin is warm and dry. Neurological:      Mental Status: He is alert and oriented to person, place, and time. Psychiatric:      Comments: Flat affect. No flight of ideas, circumferential thoughts, pressured speech. MDM  35 yo male presents to the ED with depression, SI.  Pt is afebrile, hemodynamically stable. Labs remarkable unremarkable. Tox positive for cocaine. Pt is medically clear for psych evaluation. Case discussed with Dr. Christine Motcezuma (psych) who recommended admission. Pt admitted to psych in stable condition. FINAL IMPRESSION      1.  Bipolar 1 disorder, depressed ([facility]          DISPOSITION/PLAN   DISPOSITION Decision To Admit 02/11/2023 10:01:25 PM        DISCHARGE MEDICATIONS:  [unfilled]         Flo Martin MD(electronically signed)  Attending Emergency Physician            Flo Martin MD  02/17/23 9115

## 2023-02-11 NOTE — ED TRIAGE NOTES
Pt states that he is intoxicated and arrived [facility]\".   Pt states that he was here Frogdice"  And that the medication does not work but he also states has not been taking

## 2023-02-12 PROCEDURE — 6370000000 HC RX 637 (ALT 250 FOR IP): Performed by: PSYCHIATRY & NEUROLOGY

## 2023-02-12 PROCEDURE — 1240000000 HC EMOTIONAL WELLNESS R&B

## 2023-02-12 PROCEDURE — 6370000000 HC RX 637 (ALT 250 FOR IP): Performed by: NURSE PRACTITIONER

## 2023-02-12 RX ORDER — HYDROXYZINE HYDROCHLORIDE 50 MG/ML
50 INJECTION, SOLUTION INTRAMUSCULAR EVERY 6 HOURS PRN
Status: DISCONTINUED | OUTPATIENT
Start: 2023-02-12 | End: 2023-02-24 | Stop reason: HOSPADM

## 2023-02-12 RX ORDER — VALSARTAN 160 MG/1
320 TABLET ORAL DAILY
Status: DISCONTINUED | OUTPATIENT
Start: 2023-02-12 | End: 2023-02-24 | Stop reason: HOSPADM

## 2023-02-12 RX ORDER — HYDROXYZINE PAMOATE 50 MG/1
50 CAPSULE ORAL EVERY 6 HOURS PRN
Status: DISCONTINUED | OUTPATIENT
Start: 2023-02-12 | End: 2023-02-24 | Stop reason: HOSPADM

## 2023-02-12 RX ORDER — LITHIUM CARBONATE 300 MG/1
300 CAPSULE ORAL 2 TIMES DAILY WITH MEALS
Status: DISCONTINUED | OUTPATIENT
Start: 2023-02-12 | End: 2023-02-18

## 2023-02-12 RX ADMIN — ACETAMINOPHEN 325MG 650 MG: 325 TABLET ORAL at 10:17

## 2023-02-12 RX ADMIN — Medication 100 MG: at 10:17

## 2023-02-12 RX ADMIN — FOLIC ACID 1 MG: 1 TABLET ORAL at 10:18

## 2023-02-12 RX ADMIN — VALSARTAN 320 MG: 160 TABLET, FILM COATED ORAL at 10:54

## 2023-02-12 RX ADMIN — HYDROXYZINE PAMOATE 50 MG: 50 CAPSULE ORAL at 22:06

## 2023-02-12 RX ADMIN — HYDROXYZINE PAMOATE 50 MG: 50 CAPSULE ORAL at 16:36

## 2023-02-12 RX ADMIN — CHLORDIAZEPOXIDE HYDROCHLORIDE 50 MG: 25 CAPSULE ORAL at 22:48

## 2023-02-12 RX ADMIN — HYDROXYZINE PAMOATE 50 MG: 50 CAPSULE ORAL at 10:24

## 2023-02-12 RX ADMIN — OXCARBAZEPINE 300 MG: 300 TABLET, FILM COATED ORAL at 10:18

## 2023-02-12 RX ADMIN — AMLODIPINE BESYLATE 10 MG: 10 TABLET ORAL at 10:17

## 2023-02-12 RX ADMIN — TRAZODONE HYDROCHLORIDE 50 MG: 50 TABLET ORAL at 22:06

## 2023-02-12 RX ADMIN — THERA TABS 1 TABLET: TAB at 10:17

## 2023-02-12 RX ADMIN — CHLORDIAZEPOXIDE HYDROCHLORIDE 10 MG: 5 CAPSULE ORAL at 18:31

## 2023-02-12 RX ADMIN — LITHIUM CARBONATE 300 MG: 300 CAPSULE, GELATIN COATED ORAL at 17:33

## 2023-02-12 ASSESSMENT — SLEEP AND FATIGUE QUESTIONNAIRES
DO YOU HAVE DIFFICULTY SLEEPING: NO
DO YOU USE A SLEEP AID: NO
SLEEP PATTERN: NORMAL;RESTLESSNESS
AVERAGE NUMBER OF SLEEP HOURS: 10

## 2023-02-12 ASSESSMENT — PAIN DESCRIPTION - LOCATION: LOCATION: HEAD

## 2023-02-12 ASSESSMENT — PAIN DESCRIPTION - DESCRIPTORS: DESCRIPTORS: THROBBING

## 2023-02-12 ASSESSMENT — PAIN SCALES - GENERAL: PAINLEVEL_OUTOF10: 5

## 2023-02-12 NOTE — PROGRESS NOTES
Pt cooperative with admission assessment. Reports admission d/t increased depression and SI (no plan/ no intent) x 1 month. Pt states he feels safe here and commits to safety on unit. Pt denies HI or AVH. Denies current SI. Feeling hopeless/helpless. Pt appears depressed with sad/ flat affect and poor eye contact. Admits to not taking meds for a few weeks and feels that psych meds were not working. Pt denies disturbances with appetite. Admits to sleeping too much at times- sleeping all night and sleeping all day d/t not currently working while other times pt reports poor/ restless sleep. Pt is hopeful to get on medications that work- for his mood and for his sleep. Pt reports multiple stressors- grieving the loss of daughter, lack of employment, and living with brother.

## 2023-02-12 NOTE — PROGRESS NOTES
Pt to unit via w/c with [facility] staff/ security. Familiar with unit from previous admissions. Agreeable to skin assessment and contraband check with this nurse and Dorcas RN. Pt noted with transverse left shoulder toward right hip- petechiae-like scar from- per pt- an old scar from a . No contraband found. Provided with toiletries, water, and snack. Agreeable to admission assessment.

## 2023-02-12 NOTE — PLAN OF CARE
Patient is very flat in affect and stays to himself. He speaks of withdrawing and poor self care in the last 2 weeks. Depression has continued to get worse and patient struggles to see any way out. Many losses and has stayed with brother but felt unwelcome even though it was not expressed to him. Hopeless and helpless and reports medication is not helping. Discussed ECT with doctor today and is considering the same. Problem: Risk for Elopement  Goal: Patient will not exit the unit/facility without proper excort  2/12/2023 1444 by ERNESTINE Rios  Outcome: Progressing  2/12/2023 1442 by ERNESTINE Rios  Outcome: Adequate for Discharge  2/12/2023 1441 by ERNESTINE Rios  Outcome: Adequate for Discharge  Flowsheets (Taken 2/12/2023 1439)  Nursing Interventions for Elopement Risk: Collaborate with family members/caregivers to mitigate the elopement risk     Problem: Behavior  Goal: Pt/Family maintain appropriate behavior and adhere to behavioral management agreement, if implemented  Description: INTERVENTIONS:  1. Assess patient/family's coping skills and  non-compliant behavior (including use of illegal substances)  2. Notify security of behavior or suspected illegal substances which indicate the need for search of the family and/or belongings  3. Encourage verbalization of thoughts and concerns in a socially appropriate manner  4. Utilize positive, consistent limit setting strategies supporting safety of patient, staff and others  5. Encourage participation in the decision making process about the behavioral management agreement  6. If a visitor's behavior poses a threat to safety call refer to organization policy.   7. Initiate consult with , Psychosocial CNS, Spiritual Care as appropriate  2/12/2023 1444 by ERNESTINE Rios  Outcome: Progressing  2/12/2023 1442 by ERNESTINE Rios  Outcome: Progressing     Problem: Depression/Self Harm  Goal: Effect of psychiatric condition will be minimized and patient will be protected from self harm  Description: INTERVENTIONS:  1. Assess impact of patient's symptoms on level of functioning, self care needs and offer support as indicated  2. Assess patient/family knowledge of depression, impact on illness and need for teaching  3. Provide emotional support, presence and reassurance  4. Assess for possible suicidal thoughts or ideation. If patient expresses suicidal thoughts or statements do not leave alone, initiate Suicide Precautions, move to a room close to the nursing station and obtain sitter  5. Initiate consults as appropriate with Mental Health Professional, Spiritual Care, Psychosocial CNS, and consider a recommendation to the LIP for a Psychiatric Consultation  2/12/2023 1444 by ERNESTINE Willis  Outcome: Progressing  2/12/2023 1442 by ERNESTINE Willis  Outcome: Progressing     Problem: Involuntary Admit  Goal: Will cooperate with staff recommendations and doctor's orders and will demonstrate appropriate behavior  Description: INTERVENTIONS:  1. Treat underlying conditions and offer medication as ordered  2. Educate regarding involuntary admission procedures and rules  3.  Contain excessive/inappropriate behavior per unit and hospital policies  2/98/1431 1819 by ERNESTINE Willis  Outcome: Progressing  2/12/2023 1442 by ERNESTINE Willis  Outcome: Progressing

## 2023-02-12 NOTE — PROGRESS NOTES
Pt. presents soft spoken in low monotone of voice. L ear deafness. Flat affect. Familiar with [facility] and this writer from past admissions. Reports increased depression with anxiety. was feeling suicidal and denies any homicidal thoughts. Denies AH/VH. Reports binge drinking when he does drink and denies smoking marijuana or using any other drugs. Unemployed . Has supportive friends and family. Poor concentration and memory. Continues to grieve 24 yr old daughter who OD last year. Lives with brother and is unsure if he can return there after dc. Resource folder given and explained. Stressors include  1.not having my kids and not seeing them  2. life  3.the death of my daughter  *being outdoors, car races, walking  Electronically signed by Zheng Matt on 2/12/2023 at 10:00 AM

## 2023-02-12 NOTE — PROGRESS NOTES
Pt. refused to attend the 1000 skills group, despite staff encouragement.   Electronically signed by Fuad Bruce on 2/12/2023 at 11:40 AM

## 2023-02-12 NOTE — ED NOTES
Patient resting quietly respirations are even and unlabored no distress noted at this time.   Gave patient some soda and patient was polite and thankful     José Luis Dunlap RN  02/11/23 2028

## 2023-02-12 NOTE — H&P
Dictated note at 223 pm on 2/12/23  Dx: bipolar disorder mixed depressed  Symptoms: depressed mood  Racing thoughts incessantly  Ongoing suicidal thoughts  W/intent and plan. Patient reports worsening in the last 3 weeks  Sleep alternating between hypersomnia and severe insomnia  Plan: stop trileptal  Start lithium  Discuss ECT. Lynne Leavitt MD

## 2023-02-12 NOTE — PROGRESS NOTES
Explained and gave all am meds, pt just finished talking with the doctor, pt reports a lot of depression #10, anxiety #8, gave vistaril 50mg as requested, pt presents slow to move talk appears sad , reports suicidal thoughts, denies any active thoughts, contracts for safety on the unit. pt in to shower now. reports oversleeping at home,

## 2023-02-12 NOTE — ED NOTES
Nursing report called to Gonzalo Núñez, [address] Police notified for transport and belongings     Abel Garza, [address] [facility]  02/11/23 [phone]

## 2023-02-12 NOTE — PROGRESS NOTES
Report from [facility] in [facility] AN AFFILIATE OF [facility]-    Dx Bipolar, Involuntary admit, Dr. Mireles Greene admitting. Pt to ER initially intoxicated with c/o depression and SI. Pt reports sober since release [facility] [address] [place] [address] [facility] [address] [facility] December but went on a drinking binge last night. Pt reports increased SI for 2-3 weeks. Compliant with BP medication but ran out of [facility] a few weeks ago. Pt feels medications are not working for him anyway. Pt agreeable to admission. Multiple stressors include- unemployed, inconsistent housing, death of 24year old daughter in July via overdose. BAL 0.182 upon arrival, + cocaine (but states he did not take any cocaine/denies substance use), covid negative.

## 2023-02-12 NOTE — PLAN OF CARE
Problem: Risk for Elopement  Goal: Patient will not exit the unit/facility without proper excort  2/12/2023 1442 by ERNESTINE Pugh  Outcome: Adequate for Discharge  2/12/2023 1441 by ERNESTINE Pugh  Outcome: Adequate for Discharge  Flowsheets (Taken 2/12/2023 1439)  Nursing Interventions for Elopement Risk: Collaborate with family members/caregivers to mitigate the elopement risk     Problem: Behavior  Goal: Pt/Family maintain appropriate behavior and adhere to behavioral management agreement, if implemented  Description: INTERVENTIONS:  1. Assess patient/family's coping skills and  non-compliant behavior (including use of illegal substances)  2. Notify security of behavior or suspected illegal substances which indicate the need for search of the family and/or belongings  3. Encourage verbalization of thoughts and concerns in a socially appropriate manner  4. Utilize positive, consistent limit setting strategies supporting safety of patient, staff and others  5. Encourage participation in the decision making process about the behavioral management agreement  6. If a visitor's behavior poses a threat to safety call refer to organization policy. 7. Initiate consult with , Psychosocial CNS, Spiritual Care as appropriate  Outcome: Progressing     Problem: Depression/Self Harm  Goal: Effect of psychiatric condition will be minimized and patient will be protected from self harm  Description: INTERVENTIONS:  1. Assess impact of patient's symptoms on level of functioning, self care needs and offer support as indicated  2. Assess patient/family knowledge of depression, impact on illness and need for teaching  3. Provide emotional support, presence and reassurance  4. Assess for possible suicidal thoughts or ideation.  If patient expresses suicidal thoughts or statements do not leave alone, initiate Suicide Precautions, move to a room close to the nursing station and obtain eliane  5. Initiate consults as appropriate with Mental Health Professional, Spiritual Care, Psychosocial CNS, and consider a recommendation to the LIP for a Psychiatric Consultation  Outcome: Progressing     Problem: Involuntary Admit  Goal: Will cooperate with staff recommendations and doctor's orders and will demonstrate appropriate behavior  Description: INTERVENTIONS:  1. Treat underlying conditions and offer medication as ordered  2. Educate regarding involuntary admission procedures and rules  3.  Contain excessive/inappropriate behavior per unit and hospital policies  Outcome: Progressing
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
## 2023-02-12 NOTE — PROGRESS NOTES
Morning Community Meeting Topics    Angelica Chang attended the morning community meeting on 2/12/23. Topics discussed today     [x] Introduction  Day of the week and date  Mask distribution  Current mask requirements  [x]Teams  Explanation of  Green and Blue team criteria  Nurses assigned to each team for today  Explanation about green and blue paper  Date  Patient's Name  Patient's Nurse  Goals  [x] Visitation  Announce the visiting hours for the day  Announce which team is allowed to have visitors for the day  Review any updated Covid 19 requirements for visitors during visitation  Vaccine Card or negative Covid test within 48 hours of visit  State Identification  Patients are reminded to alert the  at least 1 hour before visitation   [x] Unit Orientation  Coffee use  Phone location and etiquette  Shower locations  [place] and dryer location and process  Common area expectations  Staff rounds expectation  [x] Meals   Educate patient to the menu  The patient is encouraged to fill out the menu to get preferences at mealtime  The patient is educated that if they do not fill out the menu, they will get the standard tray  The coffee pot is decaf, patient encouraged to order regular coffee from menu.   Educate patient to the meal process  Patient encouraged to eat snacks provided twice daily  Snacks may stay in patient room     [x] Discharge Process  Discharge expectations  Fill out the survey after discharge   [x] Hygiene  Daily showers encouraged  Showers availability discussed   Daily dressing encouraged  Discussed wearing street clothing  Education provided on where to place linens and clothing  Linens in the hamper  personal clothing does not go into the linen hamper  [x] Group   Patient encouraged to attend group provided  Time of Group Meetings discussed  Gentle reminder that attendance is a Physician order  [x] Movement  Chair exercises completed  Stretching completed  Notes:   GOAL : \" to feel better\" Electronically signed by Imer Miguel on 2/12/2023 at 11:40 AM

## 2023-02-13 PROCEDURE — 6370000000 HC RX 637 (ALT 250 FOR IP): Performed by: PSYCHIATRY & NEUROLOGY

## 2023-02-13 PROCEDURE — 1240000000 HC EMOTIONAL WELLNESS R&B

## 2023-02-13 PROCEDURE — 6370000000 HC RX 637 (ALT 250 FOR IP): Performed by: NURSE PRACTITIONER

## 2023-02-13 RX ORDER — GABAPENTIN 100 MG/1
100 CAPSULE ORAL 3 TIMES DAILY
Status: DISCONTINUED | OUTPATIENT
Start: 2023-02-13 | End: 2023-02-24 | Stop reason: HOSPADM

## 2023-02-13 RX ADMIN — TRAZODONE HYDROCHLORIDE 50 MG: 50 TABLET ORAL at 20:27

## 2023-02-13 RX ADMIN — HYDROXYZINE PAMOATE 50 MG: 50 CAPSULE ORAL at 09:12

## 2023-02-13 RX ADMIN — CHLORDIAZEPOXIDE HYDROCHLORIDE 10 MG: 5 CAPSULE ORAL at 20:27

## 2023-02-13 RX ADMIN — FOLIC ACID 1 MG: 1 TABLET ORAL at 08:36

## 2023-02-13 RX ADMIN — THERA TABS 1 TABLET: TAB at 08:36

## 2023-02-13 RX ADMIN — LITHIUM CARBONATE 300 MG: 300 CAPSULE, GELATIN COATED ORAL at 17:26

## 2023-02-13 RX ADMIN — GABAPENTIN 100 MG: 100 CAPSULE ORAL at 14:09

## 2023-02-13 RX ADMIN — CHLORDIAZEPOXIDE HYDROCHLORIDE 50 MG: 25 CAPSULE ORAL at 10:20

## 2023-02-13 RX ADMIN — Medication 100 MG: at 08:36

## 2023-02-13 RX ADMIN — LITHIUM CARBONATE 300 MG: 300 CAPSULE, GELATIN COATED ORAL at 08:36

## 2023-02-13 RX ADMIN — GABAPENTIN 100 MG: 100 CAPSULE ORAL at 20:27

## 2023-02-13 RX ADMIN — VALSARTAN 320 MG: 160 TABLET, FILM COATED ORAL at 08:36

## 2023-02-13 RX ADMIN — AMLODIPINE BESYLATE 10 MG: 10 TABLET ORAL at 08:36

## 2023-02-13 NOTE — PROGRESS NOTES
Pt. refused to attend the 1000 skills group, despite staff encouragement.  Electronically signed by Hill Costello, [address] on 2/13/2023 at 1:19 PM

## 2023-02-13 NOTE — PLAN OF CARE
Pt visible on unit, social with select peers/ watching football game. Pt has good appetite. Reports increased depression and anxiety. Denies SI, HI, or AVH. Pt appears sad and depressed. Poor motivation. Pt expresses desire to feel better but is feeling hopeless. Pt contracts for safety on unit. Problem: Risk for Elopement  Goal: Patient will not exit the unit/facility without proper excort  2/13/2023 0252 by Fabien Grace RN  Outcome: Progressing  2/12/2023 1444 by ERNESTINE Waters  Outcome: Progressing  2/12/2023 1442 by ERNESTINE Waters  Outcome: Adequate for Discharge  2/12/2023 1441 by ERNESTINE Waters  Outcome: Adequate for Discharge  Flowsheets (Taken 2/12/2023 1439)  Nursing Interventions for Elopement Risk: Collaborate with family members/caregivers to mitigate the elopement risk     Problem: Behavior  Goal: Pt/Family maintain appropriate behavior and adhere to behavioral management agreement, if implemented  Description: INTERVENTIONS:  1. Assess patient/family's coping skills and  non-compliant behavior (including use of illegal substances)  2. Notify security of behavior or suspected illegal substances which indicate the need for search of the family and/or belongings  3. Encourage verbalization of thoughts and concerns in a socially appropriate manner  4. Utilize positive, consistent limit setting strategies supporting safety of patient, staff and others  5. Encourage participation in the decision making process about the behavioral management agreement  6. If a visitor's behavior poses a threat to safety call refer to organization policy.   7. Initiate consult with , Psychosocial CNS, Spiritual Care as appropriate  2/13/2023 0252 by Fabien rGace RN  Outcome: Progressing  2/12/2023 1444 by ERNESTINE Waters  Outcome: Progressing  2/12/2023 1442 by ERNESTINE Waters  Outcome: Progressing     Problem: Depression/Self Harm  Goal: Effect of psychiatric condition will be minimized and patient will be protected from self harm  Description: INTERVENTIONS:  1. Assess impact of patient's symptoms on level of functioning, self care needs and offer support as indicated  2. Assess patient/family knowledge of depression, impact on illness and need for teaching  3. Provide emotional support, presence and reassurance  4. Assess for possible suicidal thoughts or ideation. If patient expresses suicidal thoughts or statements do not leave alone, initiate Suicide Precautions, move to a room close to the nursing station and obtain sitter  5. Initiate consults as appropriate with Mental Health Professional, Spiritual Care, Psychosocial CNS, and consider a recommendation to the LIP for a Psychiatric Consultation  2/13/2023 0252 by Kanwal Botohe RN  Outcome: Progressing  Flowsheets (Taken 2/13/2023 0250)  Effect of psychiatric condition will be minimized and patient will be protected from self harm:   Provide emotional support, presence and reassurance   Assess for suicidal thoughts or ideation. If patient expresses suicidal thoughts or statements do not leave alone, initiate Suicide Precautions, move near nurse station, obtain sitter   Assess impact of patients symptoms on level of functioning, self care needs and offer support as indicated  2/12/2023 1444 by [facility] - CNS  Outcome: Progressing  2/12/2023 1442 by [facility] APRSONYA - CNS  Outcome: Progressing     Problem: Involuntary Admit  Goal: Will cooperate with staff recommendations and doctor's orders and will demonstrate appropriate behavior  Description: INTERVENTIONS:  1. Treat underlying conditions and offer medication as ordered  2. Educate regarding involuntary admission procedures and rules  3.  Contain excessive/inappropriate behavior per unit and hospital policies  3/79/0901 [id number] by Kanwal Boothe RN  Outcome: Progressing  2/12/2023 1444 by Whitney Cassidy Jackie Duffy APRN - CNS  Outcome: Progressing  2/12/2023 1442 by Laura Weeks APRN - CNS  Outcome: Progressing

## 2023-02-13 NOTE — PROGRESS NOTES
Behavioral Services  Medicare Certification Upon Admission    I certify that this patient's inpatient psychiatric hospital admission is medically necessary for:    [x] (1) Treatment which could reasonably be expected to improve this patient's condition,       [x] (2) Or for diagnostic study;     AND     [x](2) The inpatient psychiatric services are provided while the individual is under the care of a physician and are included in the individualized plan of care.     Estimated length of stay/service 3-5    Plan for post-hospital care OP care    Electronically signed by Jayant Jauregui MD on 2/13/2023 at 10:50 AM

## 2023-02-13 NOTE — PLAN OF CARE
Pt visible in day room all morning. Cooperative with assessment. Pt requests vistaril this morning for increasing anxiety at 0912. At 1030 pt reports his anxiety level continues to be high and he is feeling \"panicked and nervous\". Pt scored 11 on CIWA scale and received 50 mg of Librium. Rates depression 8/10 and anxiety 8 or 10 out of 10 with 10 being the highest. States he has social anxiety which is why he does not attend groups. Reports good sleep and good appetite. Denies SI, HI, AVH. Pt reports he has nobody at home to talk to for support. Problem: Risk for Elopement  Goal: Patient will not exit the unit/facility without proper excort  2/13/2023 1045 by Cynthia Khan RN  Outcome: Progressing  2/13/2023 0252 by Rigoberto Davis RN  Outcome: Progressing     Problem: Behavior  Goal: Pt/Family maintain appropriate behavior and adhere to behavioral management agreement, if implemented  Description: INTERVENTIONS:  1. Assess patient/family's coping skills and  non-compliant behavior (including use of illegal substances)  2. Notify security of behavior or suspected illegal substances which indicate the need for search of the family and/or belongings  3. Encourage verbalization of thoughts and concerns in a socially appropriate manner  4. Utilize positive, consistent limit setting strategies supporting safety of patient, staff and others  5. Encourage participation in the decision making process about the behavioral management agreement  6. If a visitor's behavior poses a threat to safety call refer to organization policy. 7. Initiate consult with , Psychosocial CNS, Spiritual Care as appropriate  2/13/2023 1045 by Cynthia Khan RN  Outcome: Progressing  2/13/2023 0252 by Rigoberto Davis RN  Outcome: Progressing     Problem: Depression/Self Harm  Goal: Effect of psychiatric condition will be minimized and patient will be protected from self harm  Description: INTERVENTIONS:  1. Assess impact of patient's symptoms on level of functioning, self care needs and offer support as indicated  2. Assess patient/family knowledge of depression, impact on illness and need for teaching  3. Provide emotional support, presence and reassurance  4. Assess for possible suicidal thoughts or ideation. If patient expresses suicidal thoughts or statements do not leave alone, initiate Suicide Precautions, move to a room close to the nursing station and obtain sitter  5. Initiate consults as appropriate with Mental Health Professional, Spiritual Care, Psychosocial CNS, and consider a recommendation to the LIP for a Psychiatric Consultation  2/13/2023 1045 by Gloria Boyd RN  Outcome: Progressing  2/13/2023 0252 by Alvarado Astorga RN  Outcome: Progressing  Flowsheets (Taken 2/13/2023 0250)  Effect of psychiatric condition will be minimized and patient will be protected from self harm:   Provide emotional support, presence and reassurance   Assess for suicidal thoughts or ideation. If patient expresses suicidal thoughts or statements do not leave alone, initiate Suicide Precautions, move near nurse station, obtain sitter   Assess impact of patients symptoms on level of functioning, self care needs and offer support as indicated     Problem: Involuntary Admit  Goal: Will cooperate with staff recommendations and doctor's orders and will demonstrate appropriate behavior  Description: INTERVENTIONS:  1. Treat underlying conditions and offer medication as ordered  2. Educate regarding involuntary admission procedures and rules  3.  Contain excessive/inappropriate behavior per unit and hospital policies  0/40/4523 [id number] by Gloria Boyd RN  Outcome: Progressing  2/13/2023 0252 by Alvarado Astorga RN  Outcome: Progressing

## 2023-02-13 NOTE — CARE COORDINATION
Brief Intervention and Referral to Treatment Summary    Patient was provided PHQ-9, AUDIT-C and DAST Screening:      PHQ-9 Score: 27  AUDIT-C Score: 8  DAST Score:  No Score    Patients substance use is considered     Low Risk/Healthy x  Moderate Risk  Harmful  Dependent    Patients depression is considered:     Minimal  Mild   Moderate  Moderately Severe  Severe x    Brief Education Was Provided    Patient was receptive  Patient was not receptive x      Brief Intervention Is Provided (Only for AUDIT-C or DAST)     Patient reports readiness to decrease and/or stop use and a plan was discussed   Patient denies readiness to decrease and/or stop use and a plan was not discussed x      Recommendations/Referrals for Brief and/or Specialized Treatment Provided to Patient    Patient denied any current troubles with drugs or alcohol. He wants to be linked to a mental health provider and return to using prescription medication for his depression.      Electronically signed by Adithya Mott on 2/13/2023 at 9:11 AM

## 2023-02-13 NOTE — PROGRESS NOTES
Morning Community Meeting Topics    Alecia Omalley attended the morning community meeting on 2/13/23. Topics discussed today     [x] Introduction  Day of the week and date  Mask distribution  Current mask requirements  [x]Teams  Explanation of  Green and Blue team criteria  Nurses assigned to each team for today  Explanation about green and blue paper  Date  Patient's Name  Patient's Nurse  Goals  [x] Visitation  Announce the visiting hours for the day  Announce which team is allowed to have visitors for the day  Review any updated Covid 19 requirements for visitors during visitation  Vaccine Card or negative Covid test within 48 hours of visit  State Identification  Patients are reminded to alert the  at least 1 hour before visitation   [x] Unit Orientation  Coffee use  Phone location and etiquette  Shower locations  [place] and dryer location and process  Common area expectations  Staff rounds expectation  [x] Meals   Educate patient to the menu  The patient is encouraged to fill out the menu to get preferences at mealtime  The patient is educated that if they do not fill out the menu, they will get the standard tray  The coffee pot is decaf, patient encouraged to order regular coffee from menu.   Educate patient to the meal process  Patient encouraged to eat snacks provided twice daily  Snacks may stay in patient room     [x] Discharge Process  Discharge expectations  Fill out the survey after discharge   [x] Hygiene  Daily showers encouraged  Showers availability discussed   Daily dressing encouraged  Discussed wearing street clothing  Education provided on where to place linens and clothing  Linens in the hamper  personal clothing does not go into the linen hamper  [x] Group   Patient encouraged to attend group provided  Time of Group Meetings discussed  Gentle reminder that attendance is a Physician order  [x] Movement  Chair exercises completed  Stretching completed  Notes: Goal - \"Think positive and talk to the [employer]" Electronically signed by Jos Wilkinson, [address] on 2/13/2023 at 9:44 AM

## 2023-02-13 NOTE — PROGRESS NOTES
[facility]. FOLLOW-UP NOTE       2/13/2023     Patient was seen and examined in person, Chart reviewed   Patient's case discussed with staff/team    Chief Complaint: depression, SI    Interim History:     Passive SI- states no plan but increased thoughts of feeling hopeless and helpless   Blunted, constricted affect  Reports drinking a few days prior to coming in, 2 pints of liquor per day  Isolative  Poor sleep  Increased anxiety  Poor ADLs  Racing thoughts   Pt reports discontinuing medication, states he did not follow up with outpatient providers- he did not have transportation  Agreeable to meet with ECT nurse    Appetite:   [x] Normal/Unchanged  [] Increased  [] Decreased      Sleep:       [] Normal/Unchanged  [x] Fair       [] Poor              Energy:    [x] Normal/Unchanged  [] Increased  [] Decreased        SI [] Present  [x] Absent    HI  []Present  [x] Absent     Aggression:  [] yes  [x] no    Patient is [x] able  [] unable to CONTRACT FOR SAFETY     PAST MEDICAL/PSYCHIATRIC HISTORY:   Past Medical History:   Diagnosis Date    Depression     Hypertension 2015    trx with pills x 1 month / patient did not follow up    Smoker        FAMILY/SOCIAL HISTORY:  Family History   Problem Relation Age of Onset    High Blood Pressure Mother     Heart Disease Mother     Other Mother         copd / smoker    Stroke Father     High Blood Pressure Brother     Other Brother         anxiety     Social History     Socioeconomic History    Marital status: Single     Spouse name: Not on file    Number of children: Not on file    Years of education: Not on file    Highest education level: Not on file   Occupational History    Not on file   Tobacco Use    Smoking status: Every Day     Packs/day: 1.00     Years: 20.00     Pack years: 20.00     Types: Cigarettes    Smokeless tobacco: Never   Substance and Sexual Activity    Alcohol use: Not Currently     Comment: 5th of vodka a day Drug use: Yes     Types: Marijuana Darryle Pimple), Opiates     Sexual activity: Not on file   Other Topics Concern    Not on file   Social History Narrative    Not on file     Social Determinants of Health     Financial Resource Strain: Low Risk     Difficulty of Paying Living Expenses: Not hard at all   Food Insecurity: No Food Insecurity    Worried About Running Out of Food in the Last Year: Never true    Ran Out of Food in the Last Year: Never true   Transportation Needs: Not on file   Physical Activity: Not on file   Stress: Not on file   Social Connections: Not on file   Intimate Partner Violence: Not on file   Housing Stability: Not on file           ROS:  [x] All negative/unchanged except if checked.  Explain positive(checked items) below:  [] Constitutional  [] Eyes  [] Ear/Nose/Mouth/Throat  [] Respiratory  [] CV  [] GI  []   [] Musculoskeletal  [] Skin/Breast  [] Neurological  [] Endocrine  [] Heme/Lymph  [] Allergic/Immunologic    Explanation:     MEDICATIONS:    Current Facility-Administered Medications:     gabapentin (NEURONTIN) capsule 100 mg, 100 mg, Oral, TID, Julian Morgan MD, 100 mg at 02/13/23 1409    hydrOXYzine (VISTARIL) injection 50 mg, 50 mg, IntraMUSCular, Q6H PRN **OR** hydrOXYzine pamoate (VISTARIL) capsule 50 mg, 50 mg, Oral, Q6H PRN, Carmela Guerrero MD, 50 mg at 02/13/23 0912    valsartan (DIOVAN) tablet 320 mg, 320 mg, Oral, Daily, Sherlyn Butt, APRN - CNP, 320 mg at 02/13/23 3674    lithium capsule 300 mg, 300 mg, Oral, BID WC, Carmela Guerrero MD, 300 mg at 02/13/23 0836    amLODIPine (NORVASC) tablet 10 mg, 10 mg, Oral, Daily, Carmela Guerrero MD, 10 mg at 02/13/23 0836    chlordiazePOXIDE (LIBRIUM) capsule 10 mg, 10 mg, Oral, Q4H PRN, 10 mg at 02/12/23 1831 **OR** chlordiazePOXIDE (LIBRIUM) capsule 25 mg, 25 mg, Oral, Q4H PRN **OR** chlordiazePOXIDE (LIBRIUM) capsule 50 mg, 50 mg, Oral, Q2H PRN, 50 mg at 02/13/23 1020 **OR** chlordiazePOXIDE (LIBRIUM) capsule 75 mg, 75 mg, Oral, Q1H PRN **OR** LORazepam (ATIVAN) injection 4 mg, 4 mg, IntraMUSCular, Q1H PRN, Annmarie Guerrero MD    folic acid (FOLVITE) tablet 1 mg, 1 mg, Oral, Daily, Serafin Schilder, MD, 1 mg at 02/13/23 0836    multivitamin 1 tablet, 1 tablet, Oral, Daily, Serafin Schilder, MD, 1 tablet at 02/13/23 3714    thiamine tablet 100 mg, 100 mg, Oral, Daily, Serafin Schilder, MD, 100 mg at 02/13/23 2567    acetaminophen (TYLENOL) tablet 650 mg, 650 mg, Oral, Q4H PRN, Carmela Guerrero MD, 650 mg at 02/12/23 1017    magnesium hydroxide (MILK OF MAGNESIA) 400 MG/5ML suspension 30 mL, 30 mL, Oral, Daily PRN, Carmela Guerrero MD    aluminum & magnesium hydroxide-simethicone (MAALOX) 200-200-20 MG/5ML suspension 30 mL, 30 mL, Oral, PRN, Carmela Guerrero MD    haloperidol (HALDOL) tablet 5 mg, 5 mg, Oral, Q6H PRN **OR** haloperidol lactate (HALDOL) injection 5 mg, 5 mg, IntraMUSCular, Q6H PRN, Carmela Guerrero MD    benztropine mesylate (COGENTIN) injection 2 mg, 2 mg, IntraMUSCular, BID PRN, Serafin Schilder, MD    traZODone (DESYREL) tablet 50 mg, 50 mg, Oral, Nightly PRN, Annmarie Guerrero MD, 50 mg at 02/12/23 2206      Examination:  BP (!) 141/102   Pulse 62   Temp 98 °F (36.7 °C) (Oral)   Resp 20   Ht 6' 4\" (1.93 m)   Wt 250 lb (113.4 kg)   SpO2 99%   BMI 30.43 kg/m²   Gait - steady  Medication side effects(SE): denies     Mental Status Examination:    Level of consciousness:  within normal limits   Appearance:  fair grooming and fair hygiene  Behavior/Motor:  no abnormalities noted  Attitude toward examiner:  cooperative, attentive, and good eye contact  Speech:  spontaneous, normal rate, and normal volume   Mood: anxious and decreased range  Affect:  anxious  Thought processes:  circumstantial   Thought content:  Suicidal Ideation:  denies suicidal ideation  Delusions:  no evidence of delusions  Perceptual Disturbance:  denies any perceptual disturbance  Cognition:  oriented to person, place, and time   Concentration distractible  Insight fair   Judgement fair     ASSESSMENT:   Patient symptoms are:  [] Well controlled  [x] Improving  [] Worsening  [] No change      Diagnosis:   Principal Problem:    Bipolar disorder with depression ([facility] [address]  Resolved Problems:    * No resolved hospital problems. *      LABS:    Recent Labs     02/11/23  1242   WBC 7.1   HGB 14.7        Recent Labs     02/11/23  1242 02/11/23  1815   * 146*   K 4.0 3.7   * 108*   CO2 26 28   BUN 11 12   CREATININE 0.87 0.97   GLUCOSE 122* 103*     Recent Labs     02/11/23  1242   BILITOT <0.2   ALKPHOS 108*   AST 16   ALT 15     Lab Results   Component Value Date/Time    LABAMPH Neg 02/11/2023 11:30 AM    BARBSCNU Neg 02/11/2023 11:30 AM    LABBENZ Neg 02/11/2023 11:30 AM    LABMETH Neg 02/11/2023 11:30 AM    OPIATESCREENURINE Neg 02/11/2023 11:30 AM    PHENCYCLIDINESCREENURINE Neg 02/11/2023 11:30 AM    ETOH 82 02/11/2023 06:15 PM     Lab Results   Component Value Date/Time    TSH 0.805 02/11/2023 12:42 PM     No results found for: LITHIUM  Lab Results   Component Value Date    VALPROATE 13.5 (L) 12/02/2022       RISK ASSESSMENT: impulsivity high    Treatment Plan:  Reviewed current Medications with the patient. Initiate Gabapentin 100mg TID  Meet with ECT nurse   Risks, benefits, side effects, drug-to-drug interactions and alternatives to treatment were discussed. Collateral information: see social work notes  CD evaluation ETOH   Encourage patient to attend group and other milieu activities.   Discharge planning discussed with the patient and treatment team.    PSYCHOTHERAPY/COUNSELING:  [x] Therapeutic interview  [x] Supportive  [] CBT  [] Ongoing  [] Other    [x] Patient continues to need, on a daily basis, active treatment furnished directly by or requiring the supervision of inpatient psychiatric personnel      Anticipated Length of stay:7 days        COSIGN : yes    Electronically signed by ERNESTINE Adams CNP on 2/13/2023 at 4:06 PM      Addendum:  Gege Israel seen with NP after attending the team meeting, discussing the patient with the nursing and social work staff. I participated during the interview process as well as the treatment plan and spent more than 70% of the time with the nurse practitioner helping me in documentation.   I agree with the above documentation of clinical finding and treatment plan    Physician Signature: Electronically signed by Charley Ling MD on 2/13/2023 at 5:34 PM

## 2023-02-13 NOTE — PROGRESS NOTES
Pt c/o feeling \"crappy\". Pt states unsure if feeling withdrawal symptoms or from starting new med of lithium. Pt reports feeling tremulous, sweaty, restless, anxious and has a headache. VS obtained for CIWA protocol. /82, P 80. Pt originally states he binge drank prior to admission x 1 night but now admits to drinking liquor on several occasions/ nights in the weeks leading up to admission. Pt medicated with librium 50 mg PO for CIWA score 11. Will continue to monitor pt.
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
## 2023-02-13 NOTE — FLOWSHEET NOTE
Pt has been visible out on the unit watching TV and stays to self. Pt has a flat and sad affect. Pt attended group and his goal was to \" reduce anxiety\". Pt endorses feeling hopeless and helpless and denies SI,HI,AVH.

## 2023-02-13 NOTE — CARE COORDINATION
B[facility] Biopsychosocial Assessment    Current Level of Psychosocial Functioning     Independent   Dependent  x  Minimal Assist     Comments:  Patient is unemployed and lives with his brother. He receives [employer] Assist. No other government assistance. Psychosocial High Risk Factors (check all that apply)    Unable to obtain meds   Chronic illness/pain    Substance abuse x (per patient in recovery)  Lack of Family Support   Financial stress x  Isolation   Inadequate Community Resources x  Suicide attempt(s)  Not taking medications x  Victim of crime   Developmental Delay  Unable to manage personal needs    Age 72 or older   Homeless  No transportation   Readmission within 30 days (2 months ago)  Unemployment x  Traumatic Event    Comments: Patient has at least four high risk factors associated with this admission. Psychiatric Advanced Directives: None Reported. Family to Involve in Treatment: Patient stated he does not have anyone for staff to contact on his behalf. Sexual Orientation:  Patient is in neither a hetero or homosexual relationship at this time. Patient Strengths: Patient is actively seeking help for his depression when he is not challenged with mental health symptoms. Patient Barriers: Patient does not have a mental health provider. Opiate Education Provided:  N/A    CMHC/mental health history: Patient was admitted to the [facility] about 2 months ago. Plan of Care   medication management, group/individual therapies, family meetings, psycho -education, treatment team meetings to assist with stabilization    Initial Discharge Plan:  Patient stated he is not certain about his discharge plan. Clinical Summary:    Patient is a 36year old male who was admitted to the [facility] due to the need for a mental health evaluation. Reportedly, patient is struggling with depression and suicidal ideation. When interviewed, patient presented as depressed and lethargic.  He stated that he willing to receive community mental health services so he can be compliant with his medication regime. Patient is aware of the importance of being compliant. He stated he is done with using drugs and alcohol to self medicate. Patient reviewed a safety plan from a previous admission and agreed to utilize it in time of crisis. He seems committed to getting help he needs.      Electronically signed by Helen Alfred on 2/13/2023 at 9:22 AM

## 2023-02-13 NOTE — PROGRESS NOTES
Met with patient in person. ECT explained. [facility] Electroconvulsive Therapy Handout given. Education given on need to be NPO at 4am the day of ECT. Pt verbalized understanding. MOCA, PHQ-9 assessments completed. Informed consent obtained.

## 2023-02-14 ENCOUNTER — APPOINTMENT (OUTPATIENT)
Dept: GENERAL RADIOLOGY | Age: 43
DRG: 753 | End: 2023-02-14
Payer: COMMERCIAL

## 2023-02-14 PROCEDURE — 6370000000 HC RX 637 (ALT 250 FOR IP): Performed by: PSYCHIATRY & NEUROLOGY

## 2023-02-14 PROCEDURE — 71046 X-RAY EXAM CHEST 2 VIEWS: CPT

## 2023-02-14 PROCEDURE — 6370000000 HC RX 637 (ALT 250 FOR IP): Performed by: NURSE PRACTITIONER

## 2023-02-14 PROCEDURE — 1240000000 HC EMOTIONAL WELLNESS R&B

## 2023-02-14 RX ADMIN — HYDROXYZINE PAMOATE 50 MG: 50 CAPSULE ORAL at 20:19

## 2023-02-14 RX ADMIN — GABAPENTIN 100 MG: 100 CAPSULE ORAL at 14:35

## 2023-02-14 RX ADMIN — Medication 100 MG: at 08:51

## 2023-02-14 RX ADMIN — GABAPENTIN 100 MG: 100 CAPSULE ORAL at 20:19

## 2023-02-14 RX ADMIN — TRAZODONE HYDROCHLORIDE 50 MG: 50 TABLET ORAL at 21:02

## 2023-02-14 RX ADMIN — GABAPENTIN 100 MG: 100 CAPSULE ORAL at 08:51

## 2023-02-14 RX ADMIN — VALSARTAN 320 MG: 160 TABLET, FILM COATED ORAL at 08:51

## 2023-02-14 RX ADMIN — AMLODIPINE BESYLATE 10 MG: 10 TABLET ORAL at 08:51

## 2023-02-14 RX ADMIN — HYDROXYZINE PAMOATE 50 MG: 50 CAPSULE ORAL at 08:51

## 2023-02-14 RX ADMIN — NICOTINE POLACRILEX 4 MG: 4 GUM, CHEWING BUCCAL at 18:06

## 2023-02-14 RX ADMIN — ACETAMINOPHEN 325MG 650 MG: 325 TABLET ORAL at 08:50

## 2023-02-14 RX ADMIN — LITHIUM CARBONATE 300 MG: 300 CAPSULE, GELATIN COATED ORAL at 08:51

## 2023-02-14 RX ADMIN — THERA TABS 1 TABLET: TAB at 08:51

## 2023-02-14 RX ADMIN — FOLIC ACID 1 MG: 1 TABLET ORAL at 08:51

## 2023-02-14 RX ADMIN — HYDROXYZINE PAMOATE 50 MG: 50 CAPSULE ORAL at 14:35

## 2023-02-14 RX ADMIN — NICOTINE POLACRILEX 4 MG: 4 GUM, CHEWING BUCCAL at 12:48

## 2023-02-14 RX ADMIN — LITHIUM CARBONATE 300 MG: 300 CAPSULE, GELATIN COATED ORAL at 16:50

## 2023-02-14 ASSESSMENT — PATIENT HEALTH QUESTIONNAIRE - PHQ9: SUM OF ALL RESPONSES TO PHQ QUESTIONS 1-9: 27

## 2023-02-14 ASSESSMENT — PAIN SCALES - GENERAL: PAINLEVEL_OUTOF10: 6

## 2023-02-14 ASSESSMENT — PAIN DESCRIPTION - LOCATION: LOCATION: HEAD

## 2023-02-14 ASSESSMENT — PAIN DESCRIPTION - DESCRIPTORS: DESCRIPTORS: ACHING

## 2023-02-14 NOTE — PROGRESS NOTES
Hospitalist Progress Note      Date of Admission: 2/11/2023  Chief Complaint:    Chief Complaint   Patient presents with    Mental Health Problem     States SI   Pt that he been drinking since last night     Subjective:  No new complaints. No nausea, vomiting, chest pain, or headache      Medications:    Infusion Medications   Scheduled Medications    gabapentin  100 mg Oral TID    valsartan  320 mg Oral Daily    lithium  300 mg Oral BID WC    amLODIPine  10 mg Oral Daily    folic acid  1 mg Oral Daily    multivitamin  1 tablet Oral Daily    thiamine  100 mg Oral Daily     PRN Meds: nicotine polacrilex, hydrOXYzine **OR** hydrOXYzine pamoate, chlordiazePOXIDE **OR** chlordiazePOXIDE **OR** chlordiazePOXIDE **OR** chlordiazePOXIDE **OR** LORazepam, acetaminophen, magnesium hydroxide, aluminum & magnesium hydroxide-simethicone, haloperidol **OR** haloperidol lactate, benztropine mesylate, traZODone  No intake or output data in the 24 hours ending 02/14/23 1635  Exam:  BP (!) 132/96   Pulse 62   Temp 98.1 °F (36.7 °C) (Oral)   Resp 20   Ht 6' 4\" (1.93 m)   Wt 250 lb (113.4 kg)   SpO2 98%   BMI 30.43 kg/m²   Head: Normocephalic, atraumatic  Sclera clear  Neck JVD flat  Lungs: normal effort of breathing    Labs:   No results for input(s): WBC, HGB, HCT, PLT in the last 72 hours. Recent Labs     02/11/23  1815   *   K 3.7   *   CO2 28   BUN 12   CREATININE 0.97   CALCIUM 9.3     No results for input(s): INR in the last 72 hours. No results for input(s): Salma Shanks in the last 72 hours. Radiology:  XR CHEST (2 VW)    (Results Pending)     Assessment/Plan:    I was asked to see patient regarding evaluation for ECT. Patient states he has a history of hypertension for approximately 20 years. Patient likely has structural heart disease secondary to longstanding hypertension. Patient is at risk for SVT.     Given significant sympathetic/parasympathetic outflow during ECT procedure in the background of arrhythmia risk, recommend continuous cardiac monitoring and anesthesiology evaluation prior to procedure.     Thank you     Catrina Dupont MD

## 2023-02-14 NOTE — PROGRESS NOTES
[facility] FOLLOW-UP NOTE       2/14/2023     Patient was seen and examined in person, Chart reviewed   Patient's case discussed with staff/team    Chief Complaint: depression, SI    Interim History:     Passive SI- states no plan but increased thoughts of feeling hopeless and helpless   Blunted, constricted affect  Isolative  Poor sleep  Anxiety same  Poor ADLs  Racing thoughts   Agreeable to ECT, reports feeling nervous regarding anesthesia     Appetite:   [x] Normal/Unchanged  [] Increased  [] Decreased      Sleep:       [] Normal/Unchanged  [x] Fair       [] Poor              Energy:    [x] Normal/Unchanged  [] Increased  [] Decreased        SI [x] Present  [] Absent    HI  []Present  [x] Absent     Aggression:  [] yes  [x] no    Patient is [x] able  [] unable to CONTRACT FOR SAFETY     PAST MEDICAL/PSYCHIATRIC HISTORY:   Past Medical History:   Diagnosis Date    Depression     Hypertension 2015    trx with pills x 1 month / patient did not follow up    Smoker        FAMILY/SOCIAL HISTORY:  Family History   Problem Relation Age of Onset    High Blood Pressure Mother     Heart Disease Mother     Other Mother         copd / smoker    Stroke Father     High Blood Pressure Brother     Other Brother         anxiety     Social History     Socioeconomic History    Marital status: Single     Spouse name: Not on file    Number of children: Not on file    Years of education: Not on file    Highest education level: Not on file   Occupational History    Not on file   Tobacco Use    Smoking status: Every Day     Packs/day: 1.00     Years: 20.00     Pack years: 20.00     Types: Cigarettes    Smokeless tobacco: Never   Substance and Sexual Activity    Alcohol use: Not Currently     Comment: 5th of vodka a day    Drug use: Yes     Types: Marijuana ([place]), Opiates     Sexual activity: Not on file   Other Topics Concern    Not on file   Social History Narrative    Not on file Social Determinants of Health     Financial Resource Strain: Low Risk     Difficulty of Paying Living Expenses: Not hard at all   Food Insecurity: No Food Insecurity    Worried About Running Out of Food in the Last Year: Never true    Ran Out of Food in the Last Year: Never true   Transportation Needs: Not on file   Physical Activity: Not on file   Stress: Not on file   Social Connections: Not on file   Intimate Partner Violence: Not on file   Housing Stability: Not on file           ROS:  [x] All negative/unchanged except if checked.  Explain positive(checked items) below:  [] Constitutional  [] Eyes  [] Ear/Nose/Mouth/Throat  [] Respiratory  [] CV  [] GI  []   [] Musculoskeletal  [] Skin/Breast  [] Neurological  [] Endocrine  [] Heme/Lymph  [] Allergic/Immunologic    Explanation:     MEDICATIONS:    Current Facility-Administered Medications:     nicotine polacrilex (NICORETTE) gum 4 mg, 4 mg, Oral, Q2H PRN, Adrian Pruett MD    gabapentin (NEURONTIN) capsule 100 mg, 100 mg, Oral, TID, Adrian Pruett MD, 100 mg at 02/14/23 0851    hydrOXYzine (VISTARIL) injection 50 mg, 50 mg, IntraMUSCular, Q6H PRN **OR** hydrOXYzine pamoate (VISTARIL) capsule 50 mg, 50 mg, Oral, Q6H PRN, Kasandra Guerrero MD, 50 mg at 02/14/23 0851    valsartan (DIOVAN) tablet 320 mg, 320 mg, Oral, Daily, Carlos Eduardo Portillo, APRN - CNP, 320 mg at 02/14/23 0851    lithium capsule 300 mg, 300 mg, Oral, BID WC, Carmela Guerrero MD, 300 mg at 02/14/23 0851    amLODIPine (NORVASC) tablet 10 mg, 10 mg, Oral, Daily, Carmela Guerrero MD, 10 mg at 02/14/23 0851    chlordiazePOXIDE (LIBRIUM) capsule 10 mg, 10 mg, Oral, Q4H PRN, 10 mg at 02/13/23 2027 **OR** chlordiazePOXIDE (LIBRIUM) capsule 25 mg, 25 mg, Oral, Q4H PRN **OR** chlordiazePOXIDE (LIBRIUM) capsule 50 mg, 50 mg, Oral, Q2H PRN, 50 mg at 02/13/23 1020 **OR** chlordiazePOXIDE (LIBRIUM) capsule 75 mg, 75 mg, Oral, Q1H PRN **OR** LORazepam (ATIVAN) injection 4 mg, 4 mg, IntraMUSCular, Q1H PRN, Mi Guerrero MD    folic acid (FOLVITE) tablet 1 mg, 1 mg, Oral, Daily, Christine Barker MD, 1 mg at 02/14/23 0851    multivitamin 1 tablet, 1 tablet, Oral, Daily, Christine Barker MD, 1 tablet at 02/14/23 0851    thiamine tablet 100 mg, 100 mg, Oral, Daily, Christine Barker MD, 100 mg at 02/14/23 0851    acetaminophen (TYLENOL) tablet 650 mg, 650 mg, Oral, Q4H PRN, Mi Guerrero MD, 650 mg at 02/14/23 0850    magnesium hydroxide (MILK OF MAGNESIA) 400 MG/5ML suspension 30 mL, 30 mL, Oral, Daily PRN, Carmela Guerrero MD    aluminum & magnesium hydroxide-simethicone (MAALOX) 200-200-20 MG/5ML suspension 30 mL, 30 mL, Oral, PRN, Carmela Guerrero MD    haloperidol (HALDOL) tablet 5 mg, 5 mg, Oral, Q6H PRN **OR** haloperidol lactate (HALDOL) injection 5 mg, 5 mg, IntraMUSCular, Q6H PRN, Carmela Guerrero MD    benztropine mesylate (COGENTIN) injection 2 mg, 2 mg, IntraMUSCular, BID PRN, Christine Barker MD    traZODone (DESYREL) tablet 50 mg, 50 mg, Oral, Nightly PRN, Mi Guerrero MD, 50 mg at 02/13/23 2027      Examination:  BP (!) 149/89 Comment: RN notified  Pulse 63   Temp 98.1 °F (36.7 °C) (Oral)   Resp 20   Ht 6' 4\" (1.93 m)   Wt 250 lb (113.4 kg)   SpO2 98%   BMI 30.43 kg/m²   Gait - steady  Medication side effects(SE): denies     Mental Status Examination:    Level of consciousness:  within normal limits   Appearance:  fair grooming and fair hygiene  Behavior/Motor:  no abnormalities noted  Attitude toward examiner:  cooperative, attentive, and good eye contact  Speech:  spontaneous, normal rate, and normal volume   Mood: anxious and decreased range  Affect:  anxious, flat  Thought processes:  circumstantial   Thought content:  Suicidal Ideation:  passive  Delusions:  no evidence of delusions  Perceptual Disturbance:  denies any perceptual disturbance  Cognition:  oriented to person, place, and time Concentration distractible  Insight fair   Judgement fair     ASSESSMENT:   Patient symptoms are:  [] Well controlled  [x] Improving  [] Worsening  [] No change      Diagnosis:   Principal Problem:    Bipolar disorder with depression ([facility] [address]  Resolved Problems:    * No resolved hospital problems. *      LABS:    Recent Labs     02/11/23  1242   WBC 7.1   HGB 14.7        Recent Labs     02/11/23  1242 02/11/23  1815   * 146*   K 4.0 3.7   * 108*   CO2 26 28   BUN 11 12   CREATININE 0.87 0.97   GLUCOSE 122* 103*     Recent Labs     02/11/23  1242   BILITOT <0.2   ALKPHOS 108*   AST 16   ALT 15     Lab Results   Component Value Date/Time    LABAMPH Neg 02/11/2023 11:30 AM    BARBSCNU Neg 02/11/2023 11:30 AM    LABBENZ Neg 02/11/2023 11:30 AM    LABMETH Neg 02/11/2023 11:30 AM    OPIATESCREENURINE Neg 02/11/2023 11:30 AM    PHENCYCLIDINESCREENURINE Neg 02/11/2023 11:30 AM    ETOH 82 02/11/2023 06:15 PM     Lab Results   Component Value Date/Time    TSH 0.805 02/11/2023 12:42 PM     No results found for: LITHIUM  Lab Results   Component Value Date    VALPROATE 13.5 (L) 12/02/2022       RISK ASSESSMENT: impulsivity high    Treatment Plan:  Reviewed current Medications with the patient. Monitor efficacy and tolerability of current med regime  ECT tomorrow  Hold librium if needed after 9:00pm   Risks, benefits, side effects, drug-to-drug interactions and alternatives to treatment were discussed. Collateral information: see social work notes  CD evaluation ETOH   Encourage patient to attend group and other milieu activities.   Discharge planning discussed with the patient and treatment team.    PSYCHOTHERAPY/COUNSELING:  [x] Therapeutic interview  [x] Supportive  [] CBT  [] Ongoing  [] Other    [x] Patient continues to need, on a daily basis, active treatment furnished directly by or requiring the supervision of inpatient psychiatric personnel      Anticipated Length of stay:7 days        COSIGN : yes    Electronically signed by ERNESTINE Henson CNP on 2/14/2023 at 12:23 PM    Addendum:  Brock Avilez seen with NP after attending the team meeting, discussing the patient with the nursing and social work staff. I participated during the interview process as well as the treatment plan and spent more than 70% of the time with the nurse practitioner helping me in documentation.   I agree with the above documentation of clinical finding and treatment plan    Physician Signature: Electronically signed by Tim Morse MD on 2/14/2023 at 4:55 PM

## 2023-02-14 NOTE — PROGRESS NOTES
Pt. refused to attend the 1000 skills group, despite staff encouragement.  Electronically signed by Autumn Syed [address] on 2/14/2023 at 11:26 AM

## 2023-02-14 NOTE — PROGRESS NOTES
Spiritual Support Group Note    Number of Participants in Group: 4                        Time: 8557-2126    Goal: Relief from isolation and loneliness             Catia Sharing             Self-understanding and gain insight              Acceptance and belonging            Recognize they are not alone                Socialization             Empowerment       Encouragement    Topic:  [] Spiritual Wellness and Self Care                  [] Hope                     [x] Connecting with Divine/Others        [] Thankfulness and Gratitude               []  Meaningfulness and Purpose               [] Forgiveness               [x] Peace               [] Connect to [employer]      [] Other    Participation Level:   [x] Active Listener   [] Minimal   [] Monopolizing   [x] Interactive   [] No Participation   []  Other:     Attention:   [x] Alert   [] Distractible   [] Drowsy   [] Poor   [] Other:    Manner:   [x] Cooperative   [] Suspicious   [] Withdrawn   [] Guarded   [] Irritable   [] Inhospitable   [] Other:     Others Comments from Group:

## 2023-02-14 NOTE — PROGRESS NOTES
Morning Community Meeting Topics    Apryl Muniz attended the morning community meeting on 2/14/23. Topics discussed today     [x] Introduction  Day of the week and date  Mask distribution  Current mask requirements  [x]Teams  Explanation of  Green and Blue team criteria  Nurses assigned to each team for today  Explanation about green and blue paper  Date  Patient's Name  Patient's Nurse  Goals  [x] Visitation  Announce the visiting hours for the day  Announce which team is allowed to have visitors for the day  Review any updated Covid 19 requirements for visitors during visitation  Vaccine Card or negative Covid test within 48 hours of visit  State Identification  Patients are reminded to alert the  at least 1 hour before visitation   [x] Unit Orientation  Coffee use  Phone location and etiquette  Shower locations  [place] and dryer location and process  Common area expectations  Staff rounds expectation  [x] Meals   Educate patient to the menu  The patient is encouraged to fill out the menu to get preferences at mealtime  The patient is educated that if they do not fill out the menu, they will get the standard tray  The coffee pot is decaf, patient encouraged to order regular coffee from menu.   Educate patient to the meal process  Patient encouraged to eat snacks provided twice daily  Snacks may stay in patient room     [x] Discharge Process  Discharge expectations  Fill out the survey after discharge   [x] Hygiene  Daily showers encouraged  Showers availability discussed   Daily dressing encouraged  Discussed wearing street clothing  Education provided on where to place linens and clothing  Linens in the hamper  personal clothing does not go into the linen hamper  [x] Group   Patient encouraged to attend group provided  Time of Group Meetings discussed  Gentle reminder that attendance is a Physician order  [x] Movement  Chair exercises completed  Stretching completed  Notes:  Goal - \"Stay positive\" Electronically signed by Jorgito Hernández, [address] on 2/14/2023 at 9:51 AM

## 2023-02-14 NOTE — PROGRESS NOTES
Explained and gave all am meds, pt said yes to sinus h/a, vistaril 50mg was offered for generalized anxiety.,gave neurontin 100 that pt states he use to take a long time ago for withdraw, pt reports feeling much better. explained and gave all am meds, tylenol for sinus h/a, pt ate 100 % breakfast.

## 2023-02-14 NOTE — PLAN OF CARE
Pt is out on the unit but keeps to himself. Frequently watches tv. Pt does attend some groups. Very flat/ sad. Feels hopeless/ helpless. States he does not feel suicidal when he is here. Pt states his anxiety and depression are 8/10. States he always has bad anxiety. Pt states he is a little nervous for ECT tomorrow. Reports not sleeping well. Denies HI, AVH. Pt showers and cares for ADLs. Problem: Risk for Elopement  Goal: Patient will not exit the unit/facility without proper excort  Outcome: Progressing  Flowsheets (Taken 2/14/2023 1228)  Nursing Interventions for Elopement Risk: Reduce environmental triggers     Problem: Behavior  Goal: Pt/Family maintain appropriate behavior and adhere to behavioral management agreement, if implemented  Description: INTERVENTIONS:  1. Assess patient/family's coping skills and  non-compliant behavior (including use of illegal substances)  2. Notify security of behavior or suspected illegal substances which indicate the need for search of the family and/or belongings  3. Encourage verbalization of thoughts and concerns in a socially appropriate manner  4. Utilize positive, consistent limit setting strategies supporting safety of patient, staff and others  5. Encourage participation in the decision making process about the behavioral management agreement  6. If a visitor's behavior poses a threat to safety call refer to organization policy.   7. Initiate consult with , Psychosocial CNS, Spiritual Care as appropriate  Outcome: Progressing  Flowsheets (Taken 2/14/2023 1228)  Patient/family maintains appropriate behavior and adheres to behavioral management agreement, if implemented:   Assess patient/familys coping skills and  non-compliant behavior (including use of illegal substances)   Notify security of behavior or suspected illegal substances which indicate the need for search of the patient and/or belongings   Encourage verbalization of thoughts and concerns in a socially appropriate manner     Problem: Depression/Self Harm  Goal: Effect of psychiatric condition will be minimized and patient will be protected from self harm  Description: INTERVENTIONS:  1. Assess impact of patient's symptoms on level of functioning, self care needs and offer support as indicated  2. Assess patient/family knowledge of depression, impact on illness and need for teaching  3. Provide emotional support, presence and reassurance  4. Assess for possible suicidal thoughts or ideation. If patient expresses suicidal thoughts or statements do not leave alone, initiate Suicide Precautions, move to a room close to the nursing station and obtain sitter  5. Initiate consults as appropriate with Mental Health Professional, Spiritual Care, Psychosocial CNS, and consider a recommendation to the LIP for a Psychiatric Consultation  Outcome: Progressing  Flowsheets  Taken 2/14/2023 1235  Effect of psychiatric condition will be minimized and patient will be protected from self harm:   Assess impact of patients symptoms on level of functioning, self care needs and offer support as indicated   Assess patient/family knowledge of depression, impact on illness and need for teaching   Provide emotional support, presence and reassurance   Assess for suicidal thoughts or ideation.  If patient expresses suicidal thoughts or statements do not leave alone, initiate Suicide Precautions, move near nurse station, obtain sitter   Initiate consults as appropriate with Mental Health Professional, Spiritual Care, Psychosocial CNS, and consider a recommendation to the LIP for a Psychiatric Consultation  Taken 2/14/2023 1228  Effect of psychiatric condition will be minimized and patient will be protected from self harm:   Assess impact of patients symptoms on level of functioning, self care needs and offer support as indicated   Assess patient/family knowledge of depression, impact on illness and need for teaching   Provide emotional support, presence and reassurance     Problem: Involuntary Admit  Goal: Will cooperate with staff recommendations and doctor's orders and will demonstrate appropriate behavior  Description: INTERVENTIONS:  1. Treat underlying conditions and offer medication as ordered  2. Educate regarding involuntary admission procedures and rules  3.  Contain excessive/inappropriate behavior per unit and hospital policies  Outcome: Progressing  Flowsheets (Taken 2/14/2023 1221)  Will cooperate with staff recommendations and doctor's orders and will demonstrate appropriate behavior:   Treat underlying conditions and offer medication as ordered   Contain excessive/inappropriate behavior per unit and hospital policies   Educate regarding involuntary admission procedures and rules

## 2023-02-15 ENCOUNTER — APPOINTMENT (OUTPATIENT)
Dept: POSTOP/PACU | Age: 43
DRG: 753 | End: 2023-02-15
Payer: COMMERCIAL

## 2023-02-15 ENCOUNTER — ANESTHESIA EVENT (OUTPATIENT)
Dept: POSTOP/PACU | Age: 43
End: 2023-02-15

## 2023-02-15 ENCOUNTER — ANESTHESIA (OUTPATIENT)
Dept: POSTOP/PACU | Age: 43
End: 2023-02-15

## 2023-02-15 LAB
EKG ATRIAL RATE: 60 BPM
EKG P AXIS: 43 DEGREES
EKG P-R INTERVAL: 140 MS
EKG Q-T INTERVAL: 408 MS
EKG QRS DURATION: 102 MS
EKG QTC CALCULATION (BAZETT): 408 MS
EKG R AXIS: 29 DEGREES
EKG T AXIS: 25 DEGREES
EKG VENTRICULAR RATE: 60 BPM

## 2023-02-15 PROCEDURE — GZB4ZZZ OTHER ELECTROCONVULSIVE THERAPY: ICD-10-PCS | Performed by: PSYCHIATRY & NEUROLOGY

## 2023-02-15 PROCEDURE — 6370000000 HC RX 637 (ALT 250 FOR IP): Performed by: NURSE PRACTITIONER

## 2023-02-15 PROCEDURE — 2500000003 HC RX 250 WO HCPCS: Performed by: ANESTHESIOLOGY

## 2023-02-15 PROCEDURE — 7100000000 HC PACU RECOVERY - FIRST 15 MIN

## 2023-02-15 PROCEDURE — 7100000001 HC PACU RECOVERY - ADDTL 15 MIN

## 2023-02-15 PROCEDURE — 90870 ELECTROCONVULSIVE THERAPY: CPT

## 2023-02-15 PROCEDURE — 6370000000 HC RX 637 (ALT 250 FOR IP): Performed by: PSYCHIATRY & NEUROLOGY

## 2023-02-15 PROCEDURE — 1240000000 HC EMOTIONAL WELLNESS R&B

## 2023-02-15 PROCEDURE — 2580000003 HC RX 258: Performed by: PSYCHIATRY & NEUROLOGY

## 2023-02-15 PROCEDURE — 3700000000 HC ANESTHESIA ATTENDED CARE

## 2023-02-15 PROCEDURE — 6360000002 HC RX W HCPCS: Performed by: ANESTHESIOLOGY

## 2023-02-15 RX ORDER — SUCCINYLCHOLINE/SOD CL,ISO/PF 100 MG/5ML
SYRINGE (ML) INTRAVENOUS PRN
Status: DISCONTINUED | OUTPATIENT
Start: 2023-02-15 | End: 2023-02-15 | Stop reason: SDUPTHER

## 2023-02-15 RX ORDER — IBUPROFEN 600 MG/1
600 TABLET ORAL EVERY 6 HOURS PRN
Status: DISCONTINUED | OUTPATIENT
Start: 2023-02-15 | End: 2023-02-24 | Stop reason: HOSPADM

## 2023-02-15 RX ORDER — PROPOFOL 10 MG/ML
INJECTION, EMULSION INTRAVENOUS PRN
Status: DISCONTINUED | OUTPATIENT
Start: 2023-02-15 | End: 2023-02-15 | Stop reason: SDUPTHER

## 2023-02-15 RX ORDER — ONDANSETRON 4 MG/1
4 TABLET, ORALLY DISINTEGRATING ORAL EVERY 8 HOURS PRN
Status: DISCONTINUED | OUTPATIENT
Start: 2023-02-15 | End: 2023-02-24 | Stop reason: HOSPADM

## 2023-02-15 RX ORDER — GLYCOPYRROLATE 1 MG/5 ML
SYRINGE (ML) INTRAVENOUS PRN
Status: DISCONTINUED | OUTPATIENT
Start: 2023-02-15 | End: 2023-02-15 | Stop reason: SDUPTHER

## 2023-02-15 RX ORDER — ONDANSETRON 2 MG/ML
4 INJECTION INTRAMUSCULAR; INTRAVENOUS EVERY 6 HOURS PRN
Status: DISCONTINUED | OUTPATIENT
Start: 2023-02-15 | End: 2023-02-24 | Stop reason: HOSPADM

## 2023-02-15 RX ORDER — 0.9 % SODIUM CHLORIDE 0.9 %
1000 INTRAVENOUS SOLUTION INTRAVENOUS ONCE
Status: COMPLETED | OUTPATIENT
Start: 2023-02-15 | End: 2023-02-15

## 2023-02-15 RX ORDER — ONDANSETRON 2 MG/ML
INJECTION INTRAMUSCULAR; INTRAVENOUS PRN
Status: DISCONTINUED | OUTPATIENT
Start: 2023-02-15 | End: 2023-02-15 | Stop reason: SDUPTHER

## 2023-02-15 RX ADMIN — GABAPENTIN 100 MG: 100 CAPSULE ORAL at 14:27

## 2023-02-15 RX ADMIN — TRAZODONE HYDROCHLORIDE 50 MG: 50 TABLET ORAL at 20:51

## 2023-02-15 RX ADMIN — ALUMINUM HYDROXIDE, MAGNESIUM HYDROXIDE, AND SIMETHICONE 30 ML: 200; 200; 20 SUSPENSION ORAL at 19:50

## 2023-02-15 RX ADMIN — GABAPENTIN 100 MG: 100 CAPSULE ORAL at 09:40

## 2023-02-15 RX ADMIN — AMLODIPINE BESYLATE 10 MG: 10 TABLET ORAL at 09:40

## 2023-02-15 RX ADMIN — Medication 100 MG: at 09:40

## 2023-02-15 RX ADMIN — CHLORDIAZEPOXIDE HYDROCHLORIDE 25 MG: 25 CAPSULE ORAL at 19:50

## 2023-02-15 RX ADMIN — NICOTINE POLACRILEX 4 MG: 4 GUM, CHEWING BUCCAL at 14:37

## 2023-02-15 RX ADMIN — LITHIUM CARBONATE 300 MG: 300 CAPSULE, GELATIN COATED ORAL at 09:40

## 2023-02-15 RX ADMIN — NICOTINE POLACRILEX 4 MG: 4 GUM, CHEWING BUCCAL at 09:40

## 2023-02-15 RX ADMIN — Medication 45 MG: at 08:17

## 2023-02-15 RX ADMIN — SODIUM CHLORIDE 1000 ML: 9 INJECTION, SOLUTION INTRAVENOUS at 07:22

## 2023-02-15 RX ADMIN — Medication 0.2 MG: at 08:11

## 2023-02-15 RX ADMIN — ONDANSETRON 4 MG: 2 INJECTION INTRAMUSCULAR; INTRAVENOUS at 08:11

## 2023-02-15 RX ADMIN — VALSARTAN 320 MG: 160 TABLET, FILM COATED ORAL at 09:39

## 2023-02-15 RX ADMIN — PROPOFOL 60 MG: 10 INJECTION, EMULSION INTRAVENOUS at 08:15

## 2023-02-15 RX ADMIN — HYDROXYZINE PAMOATE 50 MG: 50 CAPSULE ORAL at 09:42

## 2023-02-15 RX ADMIN — GABAPENTIN 100 MG: 100 CAPSULE ORAL at 20:51

## 2023-02-15 RX ADMIN — PROPOFOL 130 MG: 10 INJECTION, EMULSION INTRAVENOUS at 08:13

## 2023-02-15 RX ADMIN — NICOTINE POLACRILEX 4 MG: 4 GUM, CHEWING BUCCAL at 19:51

## 2023-02-15 RX ADMIN — LITHIUM CARBONATE 300 MG: 300 CAPSULE, GELATIN COATED ORAL at 17:06

## 2023-02-15 RX ADMIN — FOLIC ACID 1 MG: 1 TABLET ORAL at 09:40

## 2023-02-15 RX ADMIN — THERA TABS 1 TABLET: TAB at 09:40

## 2023-02-15 RX ADMIN — PROPOFOL 60 MG: 10 INJECTION, EMULSION INTRAVENOUS at 08:17

## 2023-02-15 ASSESSMENT — LIFESTYLE VARIABLES: SMOKING_STATUS: 1

## 2023-02-15 ASSESSMENT — PAIN SCALES - GENERAL
PAINLEVEL_OUTOF10: 8
PAINLEVEL_OUTOF10: 6
PAINLEVEL_OUTOF10: 0
PAINLEVEL_OUTOF10: 8

## 2023-02-15 ASSESSMENT — PAIN DESCRIPTION - DESCRIPTORS
DESCRIPTORS: DISCOMFORT
DESCRIPTORS: ACHING

## 2023-02-15 ASSESSMENT — PAIN DESCRIPTION - LOCATION
LOCATION: NECK
LOCATION: ABDOMEN
LOCATION: NECK;FACE

## 2023-02-15 ASSESSMENT — PAIN - FUNCTIONAL ASSESSMENT: PAIN_FUNCTIONAL_ASSESSMENT: 0-10

## 2023-02-15 NOTE — PROGRESS NOTES
Pt return from ect, vs obtained, Gag present, explained and gave all am meds, Neurontin 100 mg motrin for neck soreness. pt requested vistaril 50mg for anxiety.

## 2023-02-15 NOTE — PROGRESS NOTES
Pt is noted up on the unit, ate a good lunch, reports withdraw is completely gone that he feels much better. Reports am vistaril tylenol and Neurontin 100 were all helpful for anxiety. pt expressed he thinks the lithium as been the best help that he feels much trudy relaxed.

## 2023-02-15 NOTE — PROGRESS NOTES
Pt. refused to attend the 1000 skills group, despite staff encouragement.   Electronically signed by Justen Grossman on 2/15/2023 at 11:56 AM

## 2023-02-15 NOTE — ANESTHESIA POSTPROCEDURE EVALUATION
Department of Anesthesiology  Postprocedure Note    Patient: Shukri Schaefer  MRN: [id number]  YOB: 1980  Date of evaluation: 2/15/2023      Procedure Summary     Date: 02/15/23 [place] [facility] [place]    Anesthesia Start: 0810 Anesthesia Stop: Leotha Passe    Procedure: ECT W/ ANESTHESIA Diagnosis:     Scheduled Providers:  Responsible Provider: Maria Elena Brizuela MD    Anesthesia Type: general ASA Status: 2          Anesthesia Type: No value filed.     Mike Phase I: Mike Score: 9    Mike Phase II:        Anesthesia Post Evaluation    Patient location during evaluation: PACU  Patient participation: complete - patient participated  Level of consciousness: awake and alert  Airway patency: patent  Nausea & Vomiting: no vomiting and no nausea  Complications: no  Cardiovascular status: hemodynamically stable  Respiratory status: face mask  Hydration status: stable

## 2023-02-15 NOTE — OP NOTE
Department of Psychiatry  Electroconvulsive Therapy Treatment Note        2/15/2023    PURPOSE FOR ECT:  Therapeutic NUMBER: 1    DIAGNOSIS:   Principal Problem:    Bipolar disorder with depression ([facility] [address]  Resolved Problems:    * No resolved hospital problems.  *      MEDICATIONS:    Current Facility-Administered Medications:     ondansetron (ZOFRAN-ODT) disintegrating tablet 4 mg, 4 mg, Oral, Q8H PRN **OR** ondansetron (ZOFRAN) injection 4 mg, 4 mg, IntraVENous, Q6H PRN, Lb Tang MD    0.9 % sodium chloride bolus, 1,000 mL, IntraVENous, Once, Lb Tang MD, Last Rate: 1,000 mL/hr at 02/15/23 0722, 1,000 mL at 02/15/23 6388    nicotine polacrilex (NICORETTE) gum 4 mg, 4 mg, Oral, Q2H PRN, Lb Tang MD, 4 mg at 02/14/23 1806    gabapentin (NEURONTIN) capsule 100 mg, 100 mg, Oral, TID, Lb Tang MD, 100 mg at 02/14/23 2019    hydrOXYzine (VISTARIL) injection 50 mg, 50 mg, IntraMUSCular, Q6H PRN **OR** hydrOXYzine pamoate (VISTARIL) capsule 50 mg, 50 mg, Oral, Q6H PRN, Carmela Guerrero MD, 50 mg at 02/14/23 2019    valsartan (DIOVAN) tablet 320 mg, 320 mg, Oral, Daily, London Velez APRN - CNP, 320 mg at 02/14/23 0851    lithium capsule 300 mg, 300 mg, Oral, BID WC, Carmela Guerrero MD, 300 mg at 02/14/23 1650    amLODIPine (NORVASC) tablet 10 mg, 10 mg, Oral, Daily, Carmela Guerrero MD, 10 mg at 02/14/23 0851    chlordiazePOXIDE (LIBRIUM) capsule 10 mg, 10 mg, Oral, Q4H PRN, 10 mg at 02/13/23 2027 **OR** chlordiazePOXIDE (LIBRIUM) capsule 25 mg, 25 mg, Oral, Q4H PRN **OR** chlordiazePOXIDE (LIBRIUM) capsule 50 mg, 50 mg, Oral, Q2H PRN, 50 mg at 02/13/23 1020 **OR** chlordiazePOXIDE (LIBRIUM) capsule 75 mg, 75 mg, Oral, Q1H PRN **OR** LORazepam (ATIVAN) injection 4 mg, 4 mg, IntraMUSCular, Q1H PRN, Carmela Guerrero MD    folic acid (FOLVITE) tablet 1 mg, 1 mg, Oral, Daily, Carmela Guerrero MD, 1 mg at 02/14/23 0851    multivitamin 1 tablet, 1 tablet, Oral, Daily, Mt Carranza MD, 1 tablet at 02/14/23 0851    thiamine tablet 100 mg, 100 mg, Oral, Daily, Mt Carranza MD, 100 mg at 02/14/23 0851    acetaminophen (TYLENOL) tablet 650 mg, 650 mg, Oral, Q4H PRN, Lucy Guerrero MD, 650 mg at 02/14/23 0850    magnesium hydroxide (MILK OF MAGNESIA) 400 MG/5ML suspension 30 mL, 30 mL, Oral, Daily PRN, Carmela Guerrero MD    aluminum & magnesium hydroxide-simethicone (MAALOX) 200-200-20 MG/5ML suspension 30 mL, 30 mL, Oral, PRN, Carmela Guerrero MD    haloperidol (HALDOL) tablet 5 mg, 5 mg, Oral, Q6H PRN **OR** haloperidol lactate (HALDOL) injection 5 mg, 5 mg, IntraMUSCular, Q6H PRN, Carmela Guerrero MD    benztropine mesylate (COGENTIN) injection 2 mg, 2 mg, IntraMUSCular, BID PRN, Mt Carranza MD    traZODone (DESYREL) tablet 50 mg, 50 mg, Oral, Nightly PRN, Mt Carranza MD, 50 mg at 02/14/23 2102    CHANGE IN PSYCHIATRY STATUS SINCE LAST ECT:   N/a    INFORMED CONSENT OBTAINED : YES    PRE ECT MEDICATION ADMINISTERED:   YES    NPO STATUS: Confirmed    ELECTRODE PLACEMENT : RUL    TIME OUT :  TEAM CONFIRMS THE CORRECT PATIENT, CORRECT PROCEDURE AND CORRECT SITE.     DEVICE PARAMETERS:   Charge- 30  PW - 0.3  Freq- 20  Duration- 3.2  EEG- 25  Motor- 25     VITALS:   Vitals:    02/15/23 0750   BP: 124/80   Pulse: 59   Resp:    Temp:    SpO2: 835%         COMPLICATIONS: no      RECOMMENDATIONS FOR NEXT TREATMENT: friday        PSYCHIATRIST:  Jayant Jauregui MD

## 2023-02-15 NOTE — PLAN OF CARE
Pt visible in day room throughout evening shift. Calm and cooperative with assessment. Pt has flat affect. Reports high anxiety related to his first ECT treatment tomorrow. Pt endorses feeling mildly depressed. Denies SI, HI, and hallucinations. Pt attending groups and tending to ADLs. Reports poor sleep and good appetite. Problem: Risk for Elopement  Goal: Patient will not exit the unit/facility without proper excort  2/14/2023 2157 by Abby Sharma RN  Outcome: Progressing  2/14/2023 2155 by Abby Sharma RN  Outcome: Progressing  2/14/2023 1235 by Justin Brady RN  Outcome: Progressing  Flowsheets (Taken 2/14/2023 1228)  Nursing Interventions for Elopement Risk: Reduce environmental triggers     Problem: Behavior  Goal: Pt/Family maintain appropriate behavior and adhere to behavioral management agreement, if implemented  Description: INTERVENTIONS:  1. Assess patient/family's coping skills and  non-compliant behavior (including use of illegal substances)  2. Notify security of behavior or suspected illegal substances which indicate the need for search of the family and/or belongings  3. Encourage verbalization of thoughts and concerns in a socially appropriate manner  4. Utilize positive, consistent limit setting strategies supporting safety of patient, staff and others  5. Encourage participation in the decision making process about the behavioral management agreement  6. If a visitor's behavior poses a threat to safety call refer to organization policy.   7. Initiate consult with , Psychosocial CNS, Spiritual Care as appropriate  2/14/2023 2157 by Abby Sharma RN  Outcome: Progressing  2/14/2023 2155 by Abby Sharma RN  Outcome: Progressing  2/14/2023 1235 by Justin Brady RN  Outcome: Progressing  Flowsheets (Taken 2/14/2023 1228)  Patient/family maintains appropriate behavior and adheres to behavioral management agreement, if implemented:   Assess patient/familys coping skills and  non-compliant behavior (including use of illegal substances)   Notify security of behavior or suspected illegal substances which indicate the need for search of the patient and/or belongings   Encourage verbalization of thoughts and concerns in a socially appropriate manner     Problem: Depression/Self Harm  Goal: Effect of psychiatric condition will be minimized and patient will be protected from self harm  Description: INTERVENTIONS:  1. Assess impact of patient's symptoms on level of functioning, self care needs and offer support as indicated  2. Assess patient/family knowledge of depression, impact on illness and need for teaching  3. Provide emotional support, presence and reassurance  4. Assess for possible suicidal thoughts or ideation. If patient expresses suicidal thoughts or statements do not leave alone, initiate Suicide Precautions, move to a room close to the nursing station and obtain sitter  5. Initiate consults as appropriate with Mental Health Professional, Spiritual Care, Psychosocial CNS, and consider a recommendation to the LIP for a Psychiatric Consultation  2/14/2023 2157 by Shola Andrea RN  Outcome: Progressing  2/14/2023 2155 by Shola Andrea RN  Outcome: Progressing  Flowsheets (Taken 2/14/2023 2154)  Effect of psychiatric condition will be minimized and patient will be protected from self harm:   Assess impact of patients symptoms on level of functioning, self care needs and offer support as indicated   Assess patient/family knowledge of depression, impact on illness and need for teaching   Provide emotional support, presence and reassurance   Assess for suicidal thoughts or ideation.  If patient expresses suicidal thoughts or statements do not leave alone, initiate Suicide Precautions, move near nurse station, obtain sitter   Initiate consults as appropriate with Mental Health Professional, Spiritual Care, Psychosocial CNS, and consider a recommendation to the LIP for a Psychiatric Consultation  2/14/2023 1235 by Sarahy Johnson RN  Outcome: Progressing  Flowsheets  Taken 2/14/2023 1235  Effect of psychiatric condition will be minimized and patient will be protected from self harm:   Assess impact of patients symptoms on level of functioning, self care needs and offer support as indicated   Assess patient/family knowledge of depression, impact on illness and need for teaching   Provide emotional support, presence and reassurance   Assess for suicidal thoughts or ideation. If patient expresses suicidal thoughts or statements do not leave alone, initiate Suicide Precautions, move near nurse station, obtain sitter   Initiate consults as appropriate with Mental Health Professional, Spiritual Care, Psychosocial CNS, and consider a recommendation to the LIP for a Psychiatric Consultation  Taken 2/14/2023 1228  Effect of psychiatric condition will be minimized and patient will be protected from self harm:   Assess impact of patients symptoms on level of functioning, self care needs and offer support as indicated   Assess patient/family knowledge of depression, impact on illness and need for teaching   Provide emotional support, presence and reassurance     Problem: Involuntary Admit  Goal: Will cooperate with staff recommendations and doctor's orders and will demonstrate appropriate behavior  Description: INTERVENTIONS:  1. Treat underlying conditions and offer medication as ordered  2. Educate regarding involuntary admission procedures and rules  3.  Contain excessive/inappropriate behavior per unit and hospital policies  [id number] by Magdalena Sanders, RN  Outcome: Progressing  2/14/2023 2155 by Magdalena Sanders RN  Outcome: Progressing  Flowsheets (Taken 2/14/2023 1700)  Will cooperate with staff recommendations and doctor's orders and will demonstrate appropriate behavior:   Treat underlying conditions and offer medication as ordered   Educate regarding involuntary admission procedures and rules   Contain excessive/inappropriate behavior per unit and hospital policies  [id number]/[id number] 1764 by Christelle Dwyer RN  Outcome: Progressing  Flowsheets (Taken 2/14/2023 1228)  Will cooperate with staff recommendations and doctor's orders and will demonstrate appropriate behavior:   Treat underlying conditions and offer medication as ordered   Contain excessive/inappropriate behavior per unit and hospital policies   Educate regarding involuntary admission procedures and rules

## 2023-02-15 NOTE — PROGRESS NOTES
Morning Community Meeting Topics    Arjeanine Harrington attended the morning community meeting on 2/15/23. Topics discussed today     [x] Introduction  Day of the week and date  Mask distribution  Current mask requirements  [x]Teams  Explanation of  Green and Blue team criteria  Nurses assigned to each team for today  Explanation about green and blue paper  Date  Patient's Name  Patient's Nurse  Goals  [x] Visitation  Announce the visiting hours for the day  Announce which team is allowed to have visitors for the day  Review any updated Covid 19 requirements for visitors during visitation  Vaccine Card or negative Covid test within 48 hours of visit  State Identification  Patients are reminded to alert the  at least 1 hour before visitation   [x] Unit Orientation  Coffee use  Phone location and etiquette  Shower locations  [place] and dryer location and process  Common area expectations  Staff rounds expectation  [x] Meals   Educate patient to the menu  The patient is encouraged to fill out the menu to get preferences at mealtime  The patient is educated that if they do not fill out the menu, they will get the standard tray  The coffee pot is decaf, patient encouraged to order regular coffee from menu.   Educate patient to the meal process  Patient encouraged to eat snacks provided twice daily  Snacks may stay in patient room     [x] Discharge Process  Discharge expectations  Fill out the survey after discharge   [x] Hygiene  Daily showers encouraged  Showers availability discussed   Daily dressing encouraged  Discussed wearing street clothing  Education provided on where to place linens and clothing  Linens in the hamper  personal clothing does not go into the linen hamper  [x] Group   Patient encouraged to attend group provided  Time of Group Meetings discussed  Gentle reminder that attendance is a Physician order  [x] Movement  Chair exercises completed  Stretching completed  Notes:  GOAL : \" to stay active and positive\"  Electronically signed by Joseph Evans on 2/15/2023 at 11:55 AM

## 2023-02-15 NOTE — PLAN OF CARE
Patient went to ECT and tolerated it well. Affect appeared brighter and patient states \"I know that was my first ECT but I do feel better so I don't know if it is the ECT working or not. \"  Patient denies SI/HI and hallucnations. He reports his appetite and sleep are okay and anxiety and depression has improved. Problem: Risk for Elopement  Goal: Patient will not exit the unit/facility without proper excort  2/15/2023 0936 by Coye Meigs, RN  Outcome: Progressing  2/14/2023 2157 by Mouna Martinez RN  Outcome: Progressing  2/14/2023 2155 by Mouna Martinez RN  Outcome: Progressing     Problem: Behavior  Goal: Pt/Family maintain appropriate behavior and adhere to behavioral management agreement, if implemented  Description: INTERVENTIONS:  1. Assess patient/family's coping skills and  non-compliant behavior (including use of illegal substances)  2. Notify security of behavior or suspected illegal substances which indicate the need for search of the family and/or belongings  3. Encourage verbalization of thoughts and concerns in a socially appropriate manner  4. Utilize positive, consistent limit setting strategies supporting safety of patient, staff and others  5. Encourage participation in the decision making process about the behavioral management agreement  6. If a visitor's behavior poses a threat to safety call refer to organization policy. 7. Initiate consult with , Psychosocial CNS, Spiritual Care as appropriate  2/15/2023 0936 by Coye Meigs, RN  Outcome: Progressing  2/14/2023 2157 by Mouna Martinez RN  Outcome: Progressing  2/14/2023 2155 by Mouna Martinez RN  Outcome: Progressing     Problem: Depression/Self Harm  Goal: Effect of psychiatric condition will be minimized and patient will be protected from self harm  Description: INTERVENTIONS:  1. Assess impact of patient's symptoms on level of functioning, self care needs and offer support as indicated  2.  Assess patient/family knowledge of depression, impact on illness and need for teaching  3. Provide emotional support, presence and reassurance  4. Assess for possible suicidal thoughts or ideation. If patient expresses suicidal thoughts or statements do not leave alone, initiate Suicide Precautions, move to a room close to the nursing station and obtain sitter  5. Initiate consults as appropriate with Mental Health Professional, Spiritual Care, Psychosocial CNS, and consider a recommendation to the LIP for a Psychiatric Consultation  2/15/2023 0936 by Omar Tucker RN  Outcome: Progressing  2/14/2023 2157 by Sonia Griffith RN  Outcome: Progressing  2/14/2023 2155 by Sonia Griffith RN  Outcome: Progressing  Flowsheets (Taken 2/14/2023 2154)  Effect of psychiatric condition will be minimized and patient will be protected from self harm:   Assess impact of patients symptoms on level of functioning, self care needs and offer support as indicated   Assess patient/family knowledge of depression, impact on illness and need for teaching   Provide emotional support, presence and reassurance   Assess for suicidal thoughts or ideation. If patient expresses suicidal thoughts or statements do not leave alone, initiate Suicide Precautions, move near nurse station, obtain sitter   Initiate consults as appropriate with Mental Health Professional, Spiritual Care, Psychosocial CNS, and consider a recommendation to the LIP for a Psychiatric Consultation     Problem: Involuntary Admit  Goal: Will cooperate with staff recommendations and doctor's orders and will demonstrate appropriate behavior  Description: INTERVENTIONS:  1. Treat underlying conditions and offer medication as ordered  2. Educate regarding involuntary admission procedures and rules  3.  Contain excessive/inappropriate behavior per unit and hospital policies  [id number]/34/6464 [id number] by Omar Tucker RN  Outcome: Progressing  2/14/2023 2157 by Sonia Griffith RN  Outcome: Progressing  2/14/2023 2155 by Sonia Griffith RN  Outcome: Progressing  Flowsheets  Taken 2/14/2023 2100  Will cooperate with staff recommendations and doctor's orders and will demonstrate appropriate behavior:   Treat underlying conditions and offer medication as ordered   Contain excessive/inappropriate behavior per unit and hospital policies   Educate regarding involuntary admission procedures and rules  Taken 2/14/2023 1700  Will cooperate with staff recommendations and doctor's orders and will demonstrate appropriate behavior:   Treat underlying conditions and offer medication as ordered   Educate regarding involuntary admission procedures and rules   Contain excessive/inappropriate behavior per unit and hospital policies     Problem: Pain  Goal: Verbalizes/displays adequate comfort level or baseline comfort level  Outcome: Progressing

## 2023-02-15 NOTE — ANESTHESIA PRE PROCEDURE
Department of Anesthesiology  Preprocedure Note       Name:  Damian Harrington   Age:  36 y.o.  :  1980                                          MRN:  [id number]         Date:  2/15/2023      Surgeon: * No surgeons listed *    Procedure: Procedure(s):  ECT  Medications prior to admission:   Prior to Admission medications    Medication Sig Start Date End Date Taking? Authorizing Provider   amLODIPine (NORVASC) 10 MG tablet Take 1 tablet by mouth daily 23   Comfort Macias MD   valsartan (DIOVAN) 320 MG tablet Take 1 tablet by mouth daily 23  Comfort Mcaias MD   OXcarbazepine (TRILEPTAL) 300 MG tablet Take 1 tablet by mouth 2 times daily 23   Comfort Macias MD   traZODone (DESYREL) 50 MG tablet Take 1 tablet by mouth nightly as needed for Sleep 22   ERNESTINE Head CNP   Multiple Vitamin (MULTIVITAMIN) TABS tablet Take 1 tablet by mouth daily 22   ERNESTINE Head CNP   pantoprazole (PROTONIX) 40 MG tablet Take 1 tablet by mouth every morning (before breakfast) 22   ERNESTINE Head CNP   lurasidone (LATUDA) 40 MG TABS tablet Take 1 tablet by mouth daily 22   ERNESTINE Head CNP       Current medications:    No current facility-administered medications for this visit. No current outpatient medications on file.      Facility-Administered Medications Ordered in Other Visits   Medication Dose Route Frequency Provider Last Rate Last Admin    ondansetron (ZOFRAN-ODT) disintegrating tablet 4 mg  4 mg Oral Q8H PRN Alena Adam MD        Or    ondansetron (ZOFRAN) injection 4 mg  4 mg IntraVENous Q6H PRN Alena Adam MD        0.9 % sodium chloride bolus  1,000 mL IntraVENous Once Alena Adam MD 1,000 mL/hr at 02/15/23 0722 1,000 mL at 02/15/23 0722    nicotine polacrilex (NICORETTE) gum 4 mg  4 mg Oral Q2H PRN Alena Adam MD   4 mg at 23 180    gabapentin (NEURONTIN) capsule 100 mg  100 mg Oral TID Sangeetha Johnson Dusty Montesinos MD   100 mg at 02/14/23 2019    hydrOXYzine (VISTARIL) injection 50 mg  50 mg IntraMUSCular Q6H PRN Analy Spence MD        Or    hydrOXYzine pamoate (VISTARIL) capsule 50 mg  50 mg Oral Q6H PRN Analy Spence MD   50 mg at 02/14/23 2019    valsartan (DIOVAN) tablet 320 mg  320 mg Oral Daily John Shepherd APRN - CNP   320 mg at 02/14/23 2555    lithium capsule 300 mg  300 mg Oral BID WC Analy Spence MD   300 mg at 02/14/23 1650    amLODIPine (NORVASC) tablet 10 mg  10 mg Oral Daily Analy Spence MD   10 mg at 02/14/23 0851    chlordiazePOXIDE (LIBRIUM) capsule 10 mg  10 mg Oral Q4H PRN Analy Spence MD   10 mg at 02/13/23 2027    Or    chlordiazePOXIDE (LIBRIUM) capsule 25 mg  25 mg Oral Q4H PRN Analy Spence MD        Or    chlordiazePOXIDE (LIBRIUM) capsule 50 mg  50 mg Oral Q2H PRN Carmela Guerrero MD   50 mg at 02/13/23 1020    Or    chlordiazePOXIDE (LIBRIUM) capsule 75 mg  75 mg Oral Q1H PRN Analy Spence MD        Or    LORazepam (ATIVAN) injection 4 mg  4 mg IntraMUSCular Q1H PRN Carmela Guerrero MD        folic acid (FOLVITE) tablet 1 mg  1 mg Oral Daily Analy Spence MD   1 mg at 02/14/23 0851    multivitamin 1 tablet  1 tablet Oral Daily Analy Spence MD   1 tablet at 02/14/23 0851    thiamine tablet 100 mg  100 mg Oral Daily Analy Spence MD   100 mg at 02/14/23 0851    acetaminophen (TYLENOL) tablet 650 mg  650 mg Oral Q4H PRN Analy Spence MD   650 mg at 02/14/23 0850    magnesium hydroxide (MILK OF MAGNESIA) 400 MG/5ML suspension 30 mL  30 mL Oral Daily PRN Analy Spence MD        aluminum & magnesium hydroxide-simethicone (MAALOX) 200-200-20 MG/5ML suspension 30 mL  30 mL Oral PRN Analy Spence MD        haloperidol (HALDOL) tablet 5 mg  5 mg Oral Q6H PRN Carmela Guerrero MD        Or    haloperidol lactate (HALDOL) injection 5 mg  5 mg IntraMUSCular Q6H PRN Bob Sutton MD        benztropine mesylate (COGENTIN) injection 2 mg  2 mg IntraMUSCular BID PRN Bob Sutton MD        traZODone (DESYREL) tablet 50 mg  50 mg Oral Nightly PRN Bob Sutton MD   50 mg at 02/14/23 2102       Allergies:  No Known Allergies    Problem List:    Patient Active Problem List   Diagnosis Code    HTN (hypertension) I10    Hearing loss H91.90    Otitis media H66.90    Tinnitus H93.19    Smoker F17.200    Bipolar disord, crnt episode mixed, severe, w psych features ([facility] F31.64    Bipolar depression ([facility] F31.9    Bipolar disorder with depression ([facility] F31.9       Past Medical History:        Diagnosis Date    Depression     Hypertension 2015    trx with pills x 1 month / patient did not follow up    Smoker        Past Surgical History:        Procedure Laterality Date    OR CREATE EARDRUM OPENING,GEN ANESTH Bilateral 2/8/2018    BILATERAL MYRINGOTOMY WITH VENTILING TUBE INSERTION (PAT DONE 1/22/18) performed by Ambreen Martinez MD at [address] [place] [address]         Social History:    Social History     Tobacco Use    Smoking status: Every Day     Packs/day: 1.00     Years: 20.00     Pack years: 20.00     Types: Cigarettes    Smokeless tobacco: Never   Substance Use Topics    Alcohol use: Not Currently     Comment: 5th of vodka a day                                Ready to quit: Not Answered  Counseling given: Not Answered      Vital Signs (Current): There were no vitals filed for this visit.                                            BP Readings from Last 3 Encounters:   02/15/23 137/86   12/06/22 120/71   06/01/22 134/74       NPO Status:  NPO after midnight                                                                               BMI:   Wt Readings from Last 3 Encounters:   02/11/23 250 lb (113.4 kg)   12/02/22 250 lb (113.4 kg)   05/24/22 260 lb (117.9 kg)     There is no height or weight on file to calculate BMI.    CBC:   Lab Results   Component Value Date/Time    WBC 7.1 02/11/2023 12:42 PM    RBC 4.68 02/11/2023 12:42 PM    HGB 14.7 02/11/2023 12:42 PM    HCT 42.9 02/11/2023 12:42 PM    MCV 91.7 02/11/2023 12:42 PM    RDW 13.8 02/11/2023 12:42 PM     02/11/2023 12:42 PM       CMP:   Lab Results   Component Value Date/Time     02/11/2023 06:15 PM    K 3.7 02/11/2023 06:15 PM     02/11/2023 06:15 PM    CO2 28 02/11/2023 06:15 PM    BUN 12 02/11/2023 06:15 PM    CREATININE 0.97 02/11/2023 06:15 PM    GFRAA >60.0 05/24/2022 02:14 PM    LABGLOM >60.0 02/11/2023 06:15 PM    GLUCOSE 103 02/11/2023 06:15 PM    PROT 6.5 02/11/2023 12:42 PM    CALCIUM 9.3 02/11/2023 06:15 PM    BILITOT <0.2 02/11/2023 12:42 PM    ALKPHOS 108 02/11/2023 12:42 PM    AST 16 02/11/2023 12:42 PM    ALT 15 02/11/2023 12:42 PM       POC Tests: No results for input(s): POCGLU, POCNA, POCK, POCCL, POCBUN, POCHEMO, POCHCT in the last 72 hours. Coags:   Lab Results   Component Value Date/Time    PROTIME 9.5 09/29/2013 03:10 AM    INR 0.9 09/29/2013 03:10 AM    APTT 25.9 09/29/2013 03:10 AM       HCG (If Applicable): No results found for: PREGTESTUR, PREGSERUM, HCG, HCGQUANT     ABGs: No results found for: PHART, PO2ART, SAX2XXB, NUP2OOO, BEART, B2HCUFVP     Type & Screen (If Applicable):  No results found for: [facility]    Anesthesia Evaluation  Patient summary reviewed and Nursing notes reviewed  Airway: Mallampati: II  TM distance: >3 FB   Neck ROM: full  Mouth opening: > = 3 FB   Dental: normal exam         Pulmonary:normal exam    (+) current smoker                           Cardiovascular:    (+) hypertension:,       ECG reviewed                        Neuro/Psych:   (+) psychiatric history:            GI/Hepatic/Renal: Neg GI/Hepatic/Renal ROS            Endo/Other: Negative Endo/Other ROS                    Abdominal:             Vascular: negative vascular ROS.          Other Findings: Anesthesia Plan      general     ASA 2     (MASK)  Induction: intravenous. MIPS: Postoperative opioids intended and Prophylactic antiemetics administered. Anesthetic plan and risks discussed with patient. Plan discussed with CRNA.     Attending anesthesiologist reviewed and agrees with Pre Eval content                Kenyn Rodriges MD   2/15/2023

## 2023-02-16 PROCEDURE — 6370000000 HC RX 637 (ALT 250 FOR IP): Performed by: PSYCHIATRY & NEUROLOGY

## 2023-02-16 PROCEDURE — 1240000000 HC EMOTIONAL WELLNESS R&B

## 2023-02-16 PROCEDURE — 6370000000 HC RX 637 (ALT 250 FOR IP): Performed by: NURSE PRACTITIONER

## 2023-02-16 RX ORDER — QUETIAPINE FUMARATE 50 MG/1
50 TABLET, FILM COATED ORAL NIGHTLY
Status: DISCONTINUED | OUTPATIENT
Start: 2023-02-16 | End: 2023-02-19

## 2023-02-16 RX ADMIN — FOLIC ACID 1 MG: 1 TABLET ORAL at 08:34

## 2023-02-16 RX ADMIN — CHLORDIAZEPOXIDE HYDROCHLORIDE 10 MG: 5 CAPSULE ORAL at 17:05

## 2023-02-16 RX ADMIN — CHLORDIAZEPOXIDE HYDROCHLORIDE 10 MG: 5 CAPSULE ORAL at 21:00

## 2023-02-16 RX ADMIN — QUETIAPINE 50 MG: 50 TABLET ORAL at 20:40

## 2023-02-16 RX ADMIN — CHLORDIAZEPOXIDE HYDROCHLORIDE 10 MG: 5 CAPSULE ORAL at 12:30

## 2023-02-16 RX ADMIN — IBUPROFEN 600 MG: 600 TABLET, FILM COATED ORAL at 08:34

## 2023-02-16 RX ADMIN — LITHIUM CARBONATE 300 MG: 300 CAPSULE, GELATIN COATED ORAL at 08:34

## 2023-02-16 RX ADMIN — ACETAMINOPHEN 325MG 650 MG: 325 TABLET ORAL at 13:51

## 2023-02-16 RX ADMIN — AMLODIPINE BESYLATE 10 MG: 10 TABLET ORAL at 08:34

## 2023-02-16 RX ADMIN — NICOTINE POLACRILEX 4 MG: 4 GUM, CHEWING BUCCAL at 13:51

## 2023-02-16 RX ADMIN — GABAPENTIN 100 MG: 100 CAPSULE ORAL at 08:34

## 2023-02-16 RX ADMIN — Medication 100 MG: at 08:33

## 2023-02-16 RX ADMIN — THERA TABS 1 TABLET: TAB at 08:33

## 2023-02-16 RX ADMIN — HYDROXYZINE PAMOATE 50 MG: 50 CAPSULE ORAL at 08:34

## 2023-02-16 RX ADMIN — GABAPENTIN 100 MG: 100 CAPSULE ORAL at 13:51

## 2023-02-16 RX ADMIN — LITHIUM CARBONATE 300 MG: 300 CAPSULE, GELATIN COATED ORAL at 17:08

## 2023-02-16 RX ADMIN — GABAPENTIN 100 MG: 100 CAPSULE ORAL at 20:40

## 2023-02-16 RX ADMIN — VALSARTAN 320 MG: 160 TABLET, FILM COATED ORAL at 08:33

## 2023-02-16 ASSESSMENT — PAIN SCALES - GENERAL
PAINLEVEL_OUTOF10: 0
PAINLEVEL_OUTOF10: 5
PAINLEVEL_OUTOF10: 6

## 2023-02-16 ASSESSMENT — PAIN DESCRIPTION - DESCRIPTORS: DESCRIPTORS: ACHING

## 2023-02-16 ASSESSMENT — PAIN DESCRIPTION - LOCATION
LOCATION: HEAD
LOCATION: NECK;THROAT

## 2023-02-16 NOTE — PROGRESS NOTES
[facility]. FOLLOW-UP NOTE       2/16/2023     Patient was seen and examined in person, Chart reviewed   Patient's case discussed with staff/team    Chief Complaint: depression, SI    Interim History:     Pt reports slight improvement in mood, however decreased energy and tiredness but poor sleep quality at night  Endorse night perez related to daughters death  Denies SI HI AVH no delusions noted  Pt remains flat and incongruent  Increased sleep, adequate appetite  Reports sore throat after ECT; denies all other side effects  Isolative  Poverty of speech  Med complaint; denies side effects      Appetite:   [x] Normal/Unchanged  [] Increased  [] Decreased      Sleep:       [] Normal/Unchanged  [x] Fair       [] Poor              Energy:    [x] Normal/Unchanged  [] Increased  [] Decreased        SI [x] Present  [] Absent    HI  []Present  [x] Absent     Aggression:  [] yes  [x] no    Patient is [x] able  [] unable to CONTRACT FOR SAFETY     PAST MEDICAL/PSYCHIATRIC HISTORY:   Past Medical History:   Diagnosis Date    Depression     Hypertension 2015    trx with pills x 1 month / patient did not follow up    Smoker        FAMILY/SOCIAL HISTORY:  Family History   Problem Relation Age of Onset    High Blood Pressure Mother     Heart Disease Mother     Other Mother         copd / smoker    Stroke Father     High Blood Pressure Brother     Other Brother         anxiety     Social History     Socioeconomic History    Marital status: Single     Spouse name: Not on file    Number of children: Not on file    Years of education: Not on file    Highest education level: Not on file   Occupational History    Not on file   Tobacco Use    Smoking status: Every Day     Packs/day: 1.00     Years: 20.00     Pack years: 20.00     Types: Cigarettes    Smokeless tobacco: Never   Substance and Sexual Activity    Alcohol use: Not Currently     Comment: 5th of vodka a day    Drug use:  Yes Types: Marijuana (Chyrl Gayla), Opiates     Sexual activity: Not on file   Other Topics Concern    Not on file   Social History Narrative    Not on file     Social Determinants of Health     Financial Resource Strain: Low Risk     Difficulty of Paying Living Expenses: Not hard at all   Food Insecurity: No Food Insecurity    Worried About Running Out of Food in the Last Year: Never true    Ran Out of Food in the Last Year: Never true   Transportation Needs: Not on file   Physical Activity: Not on file   Stress: Not on file   Social Connections: Not on file   Intimate Partner Violence: Not on file   Housing Stability: Not on file           ROS:  [x] All negative/unchanged except if checked.  Explain positive(checked items) below:  [] Constitutional  [] Eyes  [] Ear/Nose/Mouth/Throat  [] Respiratory  [] CV  [] GI  []   [] Musculoskeletal  [] Skin/Breast  [] Neurological  [] Endocrine  [] Heme/Lymph  [] Allergic/Immunologic    Explanation:     MEDICATIONS:    Current Facility-Administered Medications:     QUEtiapine (SEROQUEL) tablet 50 mg, 50 mg, Oral, Nightly, Fei Romero MD    ondansetron (ZOFRAN-ODT) disintegrating tablet 4 mg, 4 mg, Oral, Q8H PRN **OR** ondansetron (ZOFRAN) injection 4 mg, 4 mg, IntraVENous, Q6H PRN, Fei Romero MD    ibuprofen (ADVIL;MOTRIN) tablet 600 mg, 600 mg, Oral, Q6H PRN, eFi Romero MD, 600 mg at 02/16/23 0834    nicotine polacrilex (NICORETTE) gum 4 mg, 4 mg, Oral, Q2H PRN, Fei Romero MD, 4 mg at 02/15/23 1951    gabapentin (NEURONTIN) capsule 100 mg, 100 mg, Oral, TID, Fei Romero MD, 100 mg at 02/16/23 0834    hydrOXYzine (VISTARIL) injection 50 mg, 50 mg, IntraMUSCular, Q6H PRN **OR** hydrOXYzine pamoate (VISTARIL) capsule 50 mg, 50 mg, Oral, Q6H PRN, Carmela Guerrero MD, 50 mg at 02/16/23 0834    valsartan (DIOVAN) tablet 320 mg, 320 mg, Oral, Daily, Rhetta Boeck, APRN - CNP, 320 mg at 02/16/23 6688    lithium capsule 300 mg, 300 mg, Oral, BID WC, Chinedu Hernandez MD, 300 mg at 02/16/23 0834    amLODIPine (NORVASC) tablet 10 mg, 10 mg, Oral, Daily, Chinedu Hernandez MD, 10 mg at 02/16/23 0834    chlordiazePOXIDE (LIBRIUM) capsule 10 mg, 10 mg, Oral, Q4H PRN, 10 mg at 02/16/23 1230 **OR** chlordiazePOXIDE (LIBRIUM) capsule 25 mg, 25 mg, Oral, Q4H PRN, 25 mg at 02/15/23 1950 **OR** chlordiazePOXIDE (LIBRIUM) capsule 50 mg, 50 mg, Oral, Q2H PRN, 50 mg at 02/13/23 1020 **OR** chlordiazePOXIDE (LIBRIUM) capsule 75 mg, 75 mg, Oral, Q1H PRN **OR** LORazepam (ATIVAN) injection 4 mg, 4 mg, IntraMUSCular, Q1H PRN, Chinedu Hernandez MD    folic acid (FOLVITE) tablet 1 mg, 1 mg, Oral, Daily, Chinedu Hernandez MD, 1 mg at 02/16/23 0834    multivitamin 1 tablet, 1 tablet, Oral, Daily, Chinedu Hernandez MD, 1 tablet at 02/16/23 4301    thiamine tablet 100 mg, 100 mg, Oral, Daily, Chinedu Hernandez MD, 100 mg at 02/16/23 7949    acetaminophen (TYLENOL) tablet 650 mg, 650 mg, Oral, Q4H PRN, Chinedu Hernandez MD, 650 mg at 02/14/23 0850    magnesium hydroxide (MILK OF MAGNESIA) 400 MG/5ML suspension 30 mL, 30 mL, Oral, Daily PRN, Chinedu Hernandez MD    aluminum & magnesium hydroxide-simethicone (MAALOX) 200-200-20 MG/5ML suspension 30 mL, 30 mL, Oral, PRN, Chinedu Hernandez MD, 30 mL at 02/15/23 1950    haloperidol (HALDOL) tablet 5 mg, 5 mg, Oral, Q6H PRN **OR** haloperidol lactate (HALDOL) injection 5 mg, 5 mg, IntraMUSCular, Q6H PRN, Carmela Guerrero MD    benztropine mesylate (COGENTIN) injection 2 mg, 2 mg, IntraMUSCular, BID PRN, Carmela Guerrero MD    traZODone (DESYREL) tablet 50 mg, 50 mg, Oral, Nightly PRN, Carmela Guerrero MD, 50 mg at 02/15/23 2051      Examination:  /76   Pulse 58   Temp 98.8 °F (37.1 °C)   Resp 16   Ht 6' 4\" (1.93 m)   Wt 250 lb (113.4 kg)   SpO2 99%   BMI 30.43 kg/m²   Gait - steady  Medication side effects(SE): denies     Mental Status Examination: Level of consciousness:  within normal limits   Appearance:  fair grooming and fair hygiene  Behavior/Motor:  no abnormalities noted  Attitude toward examiner:  cooperative, attentive, and good eye contact  Speech:  spontaneous, normal rate, and monotone  Mood: depressed decreased range  Affect:  flat  Thought processes:  circumstantial   Thought content:  Suicidal Ideation:  denies  Delusions:  no evidence of delusions  Perceptual Disturbance:  denies any perceptual disturbance  Cognition:  oriented to person, place, and time   Concentration distractible  Insight fair   Judgement fair     ASSESSMENT:   Patient symptoms are:  [] Well controlled  [x] Improving  [] Worsening  [] No change      Diagnosis:   Principal Problem:    [facility]  Resolved Problems:    * No resolved hospital problems. *      LABS:    No results for input(s): WBC, HGB, PLT in the last 72 hours. No results for input(s): NA, K, CL, CO2, BUN, CREATININE, GLUCOSE in the last 72 hours. No results for input(s): BILITOT, ALKPHOS, AST, ALT in the last 72 hours. Lab Results   Component Value Date/Time    LABAMPH Neg 02/11/2023 11:30 AM    BARBSCNU Neg 02/11/2023 11:30 AM    LABBENZ Neg 02/11/2023 11:30 AM    LABMETH Neg 02/11/2023 11:30 AM    OPIATESCREENURINE Neg 02/11/2023 11:30 AM    PHENCYCLIDINESCREENURINE Neg 02/11/2023 11:30 AM    ETOH 82 02/11/2023 06:15 PM     Lab Results   Component Value Date/Time    TSH 0.805 02/11/2023 12:42 PM     No results found for: LITHIUM  Lab Results   Component Value Date    VALPROATE 13.5 (L) 12/02/2022       RISK ASSESSMENT: low    Treatment Plan:  Reviewed current Medications with the patient. Monitor efficacy and tolerability of current med regime  ECT tomorrow  Hold librium if needed after 9:00pm   Initiate Seroquel 50mg QHS  Draw lithium level tomorrow AM  Risks, benefits, side effects, drug-to-drug interactions and alternatives to treatment were discussed.   Collateral information: see social work notes  CD evaluation ETOH   Encourage patient to attend group and other milieu activities. Discharge planning discussed with the patient and treatment team; tentative DC next week pending stability     PSYCHOTHERAPY/COUNSELING:  [x] Therapeutic interview  [x] Supportive  [] CBT  [] Ongoing  [] Other    [x] Patient continues to need, on a daily basis, active treatment furnished directly by or requiring the supervision of inpatient psychiatric personnel      Anticipated Length of stay: 5 days        COSIGN : yes    Electronically signed by ERNESTINE Adams CNP on 2/16/2023 at 12:58 PM      Addendum:  Gege Israel seen with NP after attending the team meeting, discussing the patient with the nursing and social work staff. I participated during the interview process as well as the treatment plan and spent more than 70% of the time with the nurse practitioner helping me in documentation.   I agree with the above documentation of clinical finding and treatment plan    Physician Signature: Electronically signed by Charley Ling MD on 2/16/2023 at 5:25 PM

## 2023-02-16 NOTE — PROGRESS NOTES
Pt to NS with c/o feeling anxious, and not feeling well. Pt c/o feeling nauseous,sweaty, mild tremors. Pt scored 8 on CIWA. Medicated per protocol with librium 25mg po at 1950,maalox 30cc per request at [address]. Pt reports was feeling really good earlier in the day. ,though denies this currently. Rates anxiety and depression 8/10 with 10 being the worst. Denies current SI,HI,A/V hallucinations. Contracts for safety on the unit. Reports good appetite,and sleep improved.

## 2023-02-16 NOTE — PLAN OF CARE
Problem: Risk for Elopement  Goal: Patient will not exit the unit/facility without proper excort  2/15/2023 2124 by Maegan Salamanca RN  Outcome: Progressing  Flowsheets (Taken 2/15/2023 1031 by Roberto Blakely RN)  Nursing Interventions for Elopement Risk: Reduce environmental triggers  2/15/2023 0936 by Roberto Blakely RN  Outcome: Progressing     Problem: Behavior  Goal: Pt/Family maintain appropriate behavior and adhere to behavioral management agreement, if implemented  Description: INTERVENTIONS:  1. Assess patient/family's coping skills and  non-compliant behavior (including use of illegal substances)  2. Notify security of behavior or suspected illegal substances which indicate the need for search of the family and/or belongings  3. Encourage verbalization of thoughts and concerns in a socially appropriate manner  4. Utilize positive, consistent limit setting strategies supporting safety of patient, staff and others  5. Encourage participation in the decision making process about the behavioral management agreement  6. If a visitor's behavior poses a threat to safety call refer to organization policy. 7. Initiate consult with , Psychosocial CNS, Spiritual Care as appropriate  2/15/2023 2124 by Maegan Salamanca RN  Outcome: Progressing  Flowsheets (Taken 2/15/2023 1031 by Roberto Blakely RN)  Patient/family maintains appropriate behavior and adheres to behavioral management agreement, if implemented: Assess patient/familys coping skills and  non-compliant behavior (including use of illegal substances)  2/15/2023 0936 by Roberto Blakely RN  Outcome: Progressing     Problem: Depression/Self Harm  Goal: Effect of psychiatric condition will be minimized and patient will be protected from self harm  Description: INTERVENTIONS:  1. Assess impact of patient's symptoms on level of functioning, self care needs and offer support as indicated  2.  Assess patient/family knowledge of depression, impact on illness and need for teaching  3. Provide emotional support, presence and reassurance  4. Assess for possible suicidal thoughts or ideation. If patient expresses suicidal thoughts or statements do not leave alone, initiate Suicide Precautions, move to a room close to the nursing station and obtain sitter  5. Initiate consults as appropriate with Mental Health Professional, Spiritual Care, Psychosocial CNS, and consider a recommendation to the LIP for a Psychiatric Consultation  2/15/2023 2124 by Jonathon Posada RN  Outcome: Progressing  Flowsheets (Taken 2/15/2023 1031 by Sunny Moseley RN)  Effect of psychiatric condition will be minimized and patient will be protected from self harm:   Assess impact of patients symptoms on level of functioning, self care needs and offer support as indicated   Assess patient/family knowledge of depression, impact on illness and need for teaching   Provide emotional support, presence and reassurance   Assess for suicidal thoughts or ideation. If patient expresses suicidal thoughts or statements do not leave alone, initiate Suicide Precautions, move near nurse station, obtain sitter  2/15/2023 0936 by Sunny Moseley RN  Outcome: Progressing     Problem: Involuntary Admit  Goal: Will cooperate with staff recommendations and doctor's orders and will demonstrate appropriate behavior  Description: INTERVENTIONS:  1. Treat underlying conditions and offer medication as ordered  2. Educate regarding involuntary admission procedures and rules  3.  Contain excessive/inappropriate behavior per unit and hospital policies  2/44/2044 9118 by Jonathon Posada RN  Outcome: Pawan Monge (Taken 2/15/2023 1031 by Sunny Moseley RN)  Will cooperate with staff recommendations and doctor's orders and will demonstrate appropriate behavior:   Treat underlying conditions and offer medication as ordered   Educate regarding involuntary admission procedures and rules  2/15/2023 0936 by Anton Barraza RN  Outcome: Progressing     Problem: Pain  Goal: Verbalizes/displays adequate comfort level or baseline comfort level  2/15/2023 2124 by Mark Tejada RN  Outcome: Progressing  2/15/2023 0936 by Anton Barraza RN  Outcome: Progressing     Problem: Drug Abuse/Detox  Goal: Will have no detox symptoms and will verbalize plan for changing drug-related behavior  Description: INTERVENTIONS:  1. Administer medication as ordered  2. Monitor physical status  3. Provide emotional support with 1:1 interaction with staff  4.  Encourage  recovery focused treatment   Outcome: Progressing  Flowsheets (Taken 2/15/2023 1031 by Anton Barraza, NADEGE)  Will have no detox symptoms and will verbalize plan for changing drug-related behavior:   Administer medication as ordered   Monitor physical status   Provide emotional support with 1:1 interaction with staff

## 2023-02-16 NOTE — PROGRESS NOTES
Pt is noted up on the unit eating good breakfast, not tremors or sweats noted, pt is smiling, appears bright, explained and gave all am meds. Gave Neurontin 100mg , vistaril for generalized anxiety #6, motrin for soreness to neck throat. Refused offer for nict.

## 2023-02-16 NOTE — PROGRESS NOTES
Pt. refused to attend the 1000 skills group, despite staff encouragement.   Electronically signed by Willian Hughes on 2/16/2023 at 11:43 AM

## 2023-02-16 NOTE — PROGRESS NOTES
Pt. declined to attend the 0900 community meeting, despite staff encouragement.   GOAL : \" to stay positive and go to groups\" Electronically signed by Laurent Jarrett on 2/16/2023 at 9:35 AM

## 2023-02-16 NOTE — PLAN OF CARE
Patient slept at long intervals. He reports tossing and turning last night. He denies SI, or HI. Affect remains flat and he is withdrawn. Problem: Risk for Elopement  Goal: Patient will not exit the unit/facility without proper excort  Outcome: Progressing  Flowsheets (Taken 2/16/2023 1320)  Nursing Interventions for Elopement Risk: Reduce environmental triggers     Problem: Behavior  Goal: Pt/Family maintain appropriate behavior and adhere to behavioral management agreement, if implemented  Description: INTERVENTIONS:  1. Assess patient/family's coping skills and  non-compliant behavior (including use of illegal substances)  2. Notify security of behavior or suspected illegal substances which indicate the need for search of the family and/or belongings  3. Encourage verbalization of thoughts and concerns in a socially appropriate manner  4. Utilize positive, consistent limit setting strategies supporting safety of patient, staff and others  5. Encourage participation in the decision making process about the behavioral management agreement  6. If a visitor's behavior poses a threat to safety call refer to organization policy. 7. Initiate consult with , Psychosocial CNS, Spiritual Care as appropriate  Outcome: Progressing  Flowsheets (Taken 2/16/2023 1320)  Patient/family maintains appropriate behavior and adheres to behavioral management agreement, if implemented:   Assess patient/familys coping skills and  non-compliant behavior (including use of illegal substances)   Notify security of behavior or suspected illegal substances which indicate the need for search of the patient and/or belongings   Utilize positive, consistent limit setting strategies supporting safety of patient, staff and others     Problem: Depression/Self Harm  Goal: Effect of psychiatric condition will be minimized and patient will be protected from self harm  Description: INTERVENTIONS:  1.  Assess impact of patient's symptoms on level of functioning, self care needs and offer support as indicated  2. Assess patient/family knowledge of depression, impact on illness and need for teaching  3. Provide emotional support, presence and reassurance  4. Assess for possible suicidal thoughts or ideation. If patient expresses suicidal thoughts or statements do not leave alone, initiate Suicide Precautions, move to a room close to the nursing station and obtain sitter  5. Initiate consults as appropriate with Mental Health Professional, Spiritual Care, Psychosocial CNS, and consider a recommendation to the [facility]IP for a Psychiatric Consultation  Outcome: Progressing  Flowsheets  Taken 2/16/2023 1329  Effect of psychiatric condition will be minimized and patient will be protected from self harm:   Assess impact of patients symptoms on level of functioning, self care needs and offer support as indicated   Assess patient/family knowledge of depression, impact on illness and need for teaching  Taken 2/16/2023 1320  Effect of psychiatric condition will be minimized and patient will be protected from self harm:   Assess impact of patients symptoms on level of functioning, self care needs and offer support as indicated   Assess patient/family knowledge of depression, impact on illness and need for teaching   Provide emotional support, presence and reassurance     Problem: Involuntary Admit  Goal: Will cooperate with staff recommendations and doctor's orders and will demonstrate appropriate behavior  Description: INTERVENTIONS:  1. Treat underlying conditions and offer medication as ordered  2. Educate regarding involuntary admission procedures and rules  3.  Contain excessive/inappropriate behavior per unit and hospital policies  Outcome: Progressing  Flowsheets (Taken 2/16/2023 1320)  Will cooperate with staff recommendations and doctor's orders and will demonstrate appropriate behavior: Treat underlying conditions and offer medication as ordered Problem: Drug Abuse/Detox  Goal: Will have no detox symptoms and will verbalize plan for changing drug-related behavior  Description: INTERVENTIONS:  1. Administer medication as ordered  2. Monitor physical status  3. Provide emotional support with 1:1 interaction with staff  4.  Encourage  recovery focused treatment   Outcome: Progressing  Flowsheets (Taken 2/16/2023 1320)  Will have no detox symptoms and will verbalize plan for changing drug-related behavior: Administer medication as ordered     Problem: Pain  Goal: Verbalizes/displays adequate comfort level or baseline comfort level  Outcome: Progressing

## 2023-02-17 ENCOUNTER — APPOINTMENT (OUTPATIENT)
Dept: POSTOP/PACU | Age: 43
DRG: 753 | End: 2023-02-17
Payer: COMMERCIAL

## 2023-02-17 ENCOUNTER — ANESTHESIA (OUTPATIENT)
Dept: POSTOP/PACU | Age: 43
End: 2023-02-17

## 2023-02-17 ENCOUNTER — ANESTHESIA EVENT (OUTPATIENT)
Dept: POSTOP/PACU | Age: 43
End: 2023-02-17

## 2023-02-17 LAB — LITHIUM LEVEL: 0.3 MEQ/L (ref 0.6–1.2)

## 2023-02-17 PROCEDURE — 90870 ELECTROCONVULSIVE THERAPY: CPT

## 2023-02-17 PROCEDURE — 7100000000 HC PACU RECOVERY - FIRST 15 MIN

## 2023-02-17 PROCEDURE — 3700000000 HC ANESTHESIA ATTENDED CARE

## 2023-02-17 PROCEDURE — 6370000000 HC RX 637 (ALT 250 FOR IP): Performed by: PSYCHIATRY & NEUROLOGY

## 2023-02-17 PROCEDURE — GZB4ZZZ OTHER ELECTROCONVULSIVE THERAPY: ICD-10-PCS | Performed by: PSYCHIATRY & NEUROLOGY

## 2023-02-17 PROCEDURE — 2580000003 HC RX 258: Performed by: PSYCHIATRY & NEUROLOGY

## 2023-02-17 PROCEDURE — 7100000001 HC PACU RECOVERY - ADDTL 15 MIN

## 2023-02-17 PROCEDURE — 2500000003 HC RX 250 WO HCPCS: Performed by: ANESTHESIOLOGY

## 2023-02-17 PROCEDURE — 6360000002 HC RX W HCPCS: Performed by: ANESTHESIOLOGY

## 2023-02-17 PROCEDURE — 1240000000 HC EMOTIONAL WELLNESS R&B

## 2023-02-17 PROCEDURE — 36415 COLL VENOUS BLD VENIPUNCTURE: CPT

## 2023-02-17 PROCEDURE — 6370000000 HC RX 637 (ALT 250 FOR IP): Performed by: NURSE PRACTITIONER

## 2023-02-17 PROCEDURE — 80178 ASSAY OF LITHIUM: CPT

## 2023-02-17 RX ORDER — ONDANSETRON 4 MG/1
4 TABLET, ORALLY DISINTEGRATING ORAL EVERY 8 HOURS PRN
Status: DISCONTINUED | OUTPATIENT
Start: 2023-02-17 | End: 2023-02-17

## 2023-02-17 RX ORDER — QUETIAPINE FUMARATE 25 MG/1
25 TABLET, FILM COATED ORAL 2 TIMES DAILY
Status: DISCONTINUED | OUTPATIENT
Start: 2023-02-17 | End: 2023-02-24 | Stop reason: HOSPADM

## 2023-02-17 RX ORDER — GLYCOPYRROLATE 1 MG/5 ML
SYRINGE (ML) INTRAVENOUS PRN
Status: DISCONTINUED | OUTPATIENT
Start: 2023-02-17 | End: 2023-02-17 | Stop reason: SDUPTHER

## 2023-02-17 RX ORDER — ONDANSETRON 2 MG/ML
INJECTION INTRAMUSCULAR; INTRAVENOUS PRN
Status: DISCONTINUED | OUTPATIENT
Start: 2023-02-17 | End: 2023-02-17 | Stop reason: SDUPTHER

## 2023-02-17 RX ORDER — ONDANSETRON 2 MG/ML
4 INJECTION INTRAMUSCULAR; INTRAVENOUS EVERY 6 HOURS PRN
Status: DISCONTINUED | OUTPATIENT
Start: 2023-02-17 | End: 2023-02-17

## 2023-02-17 RX ORDER — SUCCINYLCHOLINE/SOD CL,ISO/PF 100 MG/5ML
SYRINGE (ML) INTRAVENOUS PRN
Status: DISCONTINUED | OUTPATIENT
Start: 2023-02-17 | End: 2023-02-17 | Stop reason: SDUPTHER

## 2023-02-17 RX ORDER — PROPOFOL 10 MG/ML
INJECTION, EMULSION INTRAVENOUS PRN
Status: DISCONTINUED | OUTPATIENT
Start: 2023-02-17 | End: 2023-02-17 | Stop reason: SDUPTHER

## 2023-02-17 RX ORDER — 0.9 % SODIUM CHLORIDE 0.9 %
1000 INTRAVENOUS SOLUTION INTRAVENOUS ONCE
Status: COMPLETED | OUTPATIENT
Start: 2023-02-17 | End: 2023-02-17

## 2023-02-17 RX ADMIN — Medication 0.2 MG: at 12:17

## 2023-02-17 RX ADMIN — SODIUM CHLORIDE 1000 ML: 9 INJECTION, SOLUTION INTRAVENOUS at 11:28

## 2023-02-17 RX ADMIN — HYDROXYZINE PAMOATE 50 MG: 50 CAPSULE ORAL at 21:02

## 2023-02-17 RX ADMIN — AMLODIPINE BESYLATE 10 MG: 10 TABLET ORAL at 13:27

## 2023-02-17 RX ADMIN — VALSARTAN 320 MG: 160 TABLET, FILM COATED ORAL at 13:25

## 2023-02-17 RX ADMIN — THERA TABS 1 TABLET: TAB at 13:25

## 2023-02-17 RX ADMIN — QUETIAPINE FUMARATE 25 MG: 25 TABLET ORAL at 14:23

## 2023-02-17 RX ADMIN — FOLIC ACID 1 MG: 1 TABLET ORAL at 13:25

## 2023-02-17 RX ADMIN — NICOTINE POLACRILEX 4 MG: 4 GUM, CHEWING BUCCAL at 13:26

## 2023-02-17 RX ADMIN — GABAPENTIN 100 MG: 100 CAPSULE ORAL at 21:02

## 2023-02-17 RX ADMIN — HYDROXYZINE PAMOATE 50 MG: 50 CAPSULE ORAL at 13:25

## 2023-02-17 RX ADMIN — ONDANSETRON 4 MG: 2 INJECTION INTRAMUSCULAR; INTRAVENOUS at 12:17

## 2023-02-17 RX ADMIN — SODIUM CHLORIDE 1000 ML: 9 INJECTION, SOLUTION INTRAVENOUS at 11:42

## 2023-02-17 RX ADMIN — PROPOFOL 40 MG: 10 INJECTION, EMULSION INTRAVENOUS at 12:22

## 2023-02-17 RX ADMIN — Medication 60 MG: at 12:21

## 2023-02-17 RX ADMIN — LITHIUM CARBONATE 300 MG: 300 CAPSULE, GELATIN COATED ORAL at 13:24

## 2023-02-17 RX ADMIN — PROPOFOL 200 MG: 10 INJECTION, EMULSION INTRAVENOUS at 12:21

## 2023-02-17 RX ADMIN — QUETIAPINE 50 MG: 50 TABLET ORAL at 21:02

## 2023-02-17 RX ADMIN — LITHIUM CARBONATE 300 MG: 300 CAPSULE, GELATIN COATED ORAL at 16:37

## 2023-02-17 RX ADMIN — GABAPENTIN 100 MG: 100 CAPSULE ORAL at 13:24

## 2023-02-17 RX ADMIN — Medication 100 MG: at 13:25

## 2023-02-17 ASSESSMENT — PAIN - FUNCTIONAL ASSESSMENT: PAIN_FUNCTIONAL_ASSESSMENT: NONE - DENIES PAIN

## 2023-02-17 ASSESSMENT — PAIN SCALES - GENERAL: PAINLEVEL_OUTOF10: 0

## 2023-02-17 ASSESSMENT — LIFESTYLE VARIABLES: SMOKING_STATUS: 1

## 2023-02-17 ASSESSMENT — PATIENT HEALTH QUESTIONNAIRE - PHQ9: SUM OF ALL RESPONSES TO PHQ QUESTIONS 1-9: 25

## 2023-02-17 NOTE — OP NOTE
Department of Psychiatry  Electroconvulsive Therapy Treatment Note        2/17/2023    PURPOSE FOR ECT:  Therapeutic NUMBER: 2    DIAGNOSIS:   Principal Problem:    B[facility] with [facility]  Resolved Problems:    * No resolved hospital problems.  *      MEDICATIONS:    Current Facility-Administered Medications:     [COMPLETED] 0.9 % sodium chloride bolus, 1,000 mL, IntraVENous, Once, Christiana Boeck, MD, Last Rate: 1,000 mL/hr at 02/17/23 1142, 1,000 mL at 02/17/23 1142    QUEtiapine (SEROQUEL) tablet 50 mg, 50 mg, Oral, Nightly, Christiana Boeck, MD, 50 mg at 02/16/23 2040    ondansetron (ZOFRAN-ODT) disintegrating tablet 4 mg, 4 mg, Oral, Q8H PRN **OR** ondansetron (ZOFRAN) injection 4 mg, 4 mg, IntraVENous, Q6H PRN, Christiana Boeck, MD    ibuprofen (ADVIL;MOTRIN) tablet 600 mg, 600 mg, Oral, Q6H PRN, Christiana Boeck, MD, 600 mg at 02/16/23 0834    nicotine polacrilex (NICORETTE) gum 4 mg, 4 mg, Oral, Q2H PRN, Christiana Boeck, MD, 4 mg at 02/16/23 1351    gabapentin (NEURONTIN) capsule 100 mg, 100 mg, Oral, TID, Christiana Boeck, MD, 100 mg at 02/16/23 2040    hydrOXYzine (VISTARIL) injection 50 mg, 50 mg, IntraMUSCular, Q6H PRN **OR** hydrOXYzine pamoate (VISTARIL) capsule 50 mg, 50 mg, Oral, Q6H PRN, Carmela Guerrero MD, 50 mg at 02/16/23 0834    valsartan (DIOVAN) tablet 320 mg, 320 mg, Oral, Daily, Kwan Roman, APRN - CNP, 320 mg at 02/16/23 1621    lithium capsule 300 mg, 300 mg, Oral, BID WC, Carmela Guerrero MD, 300 mg at 02/16/23 1708    amLODIPine (NORVASC) tablet 10 mg, 10 mg, Oral, Daily, Carmela Guerrero MD, 10 mg at 02/16/23 0834    chlordiazePOXIDE (LIBRIUM) capsule 10 mg, 10 mg, Oral, Q4H PRN, 10 mg at 02/16/23 2100 **OR** chlordiazePOXIDE (LIBRIUM) capsule 25 mg, 25 mg, Oral, Q4H PRN, 25 mg at 02/15/23 1950 **OR** chlordiazePOXIDE (LIBRIUM) capsule 50 mg, 50 mg, Oral, Q2H PRN, 50 mg at 02/13/23 1020 **OR** chlordiazePOXIDE (LIBRIUM) capsule 75 mg, 75 mg, Oral, Q1H PRN **OR** LORazepam (ATIVAN) injection 4 mg, 4 mg, IntraMUSCular, Q1H PRN, [place] Guerrero MD    folic acid (FOLVITE) tablet 1 mg, 1 mg, Oral, Daily, Carmela Guerrero MD, 1 mg at 02/16/23 9849    multivitamin 1 tablet, 1 tablet, Oral, Daily, Yeimi Armijo MD, 1 tablet at 02/16/23 5137    thiamine tablet 100 mg, 100 mg, Oral, Daily, Carmela Guerrero MD, 100 mg at 02/16/23 2473    acetaminophen (TYLENOL) tablet 650 mg, 650 mg, Oral, Q4H PRN, [place] Guerrero MD, 650 mg at 02/16/23 1351    magnesium hydroxide (MILK OF MAGNESIA) 400 MG/5ML suspension 30 mL, 30 mL, Oral, Daily PRN, [place] Guerrero MD    aluminum & magnesium hydroxide-simethicone (MAALOX) 200-200-20 MG/5ML suspension 30 mL, 30 mL, Oral, PRN, Carmela Guerrero MD, 30 mL at 02/15/23 1950    haloperidol (HALDOL) tablet 5 mg, 5 mg, Oral, Q6H PRN **OR** haloperidol lactate (HALDOL) injection 5 mg, 5 mg, IntraMUSCular, Q6H PRN, Carmela Guerrero MD    benztropine mesylate (COGENTIN) injection 2 mg, 2 mg, IntraMUSCular, BID PRN, Carmela Guerrero MD    traZODone (DESYREL) tablet 50 mg, 50 mg, Oral, Nightly PRN, Carmela Guerrero MD, 50 mg at 02/15/23 2051    Facility-Administered Medications Ordered in Other Encounters:     propofol injection, , IntraVENous, PRN, Keily Mares MD, 40 mg at 02/17/23 1222    succinylcholine chloride (ANECTINE) injection, , IntraVENous, PRN, Keily Mares MD, 60 mg at 02/17/23 1221    glycopyrrolate (ROBINUL) injection, , IntraVENous, PRN, Keily Mares MD, 0.2 mg at 02/17/23 1217    ondansetron (ZOFRAN) injection, , IntraVENous, PRN, Keily Mares MD, 4 mg at 02/17/23 1217    CHANGE IN PSYCHIATRY STATUS SINCE LAST ECT:   No change    INFORMED CONSENT OBTAINED : YES    PRE ECT MEDICATION ADMINISTERED:   YES    NPO STATUS: Confirmed    ELECTRODE PLACEMENT : RUL    TIME OUT :  TEAM CONFIRMS THE CORRECT PATIENT, CORRECT PROCEDURE AND CORRECT SITE.     DEVICE PARAMETERS:   Charge- 153  PW - 0.3  Freq- 40  Duration- 8  EEG- 29  Motor- 25     VITALS:   Vitals:    02/17/23 1055   BP: (!) 143/83   Pulse: 69   Resp: 16   Temp: 98 °F (36.7 °C)   SpO2: 07%         COMPLICATIONS: no      RECOMMENDATIONS FOR NEXT TREATMENT: mon        PSYCHIATRIST:  Tim Morse MD

## 2023-02-17 NOTE — PROGRESS NOTES
Patient did not attend the 1000 skills group due to being off the unit for ECT.  Electronically signed by Sandra A[address] on 2/17/2023 at 12:02 PM

## 2023-02-17 NOTE — ANESTHESIA POSTPROCEDURE EVALUATION
Department of Anesthesiology  Postprocedure Note    Patient: Arturo Chatman  MRN: [id number]  YOB: 1980  Date of evaluation: 2/17/2023      Procedure Summary     Date: 02/17/23 Room / Location: [facility] PACU    Anesthesia Start: 1217 Anesthesia Stop: 1224    Procedure: ECT W/ ANESTHESIA Diagnosis:     Scheduled Providers:  Responsible Provider: Josette Morrow MD    Anesthesia Type: General ASA Status: 2          Anesthesia Type: General    Mike Phase I: Mike Score: 10    Mike Phase II:        Anesthesia Post Evaluation    Patient location during evaluation: bedside  Patient participation: complete - patient participated  Level of consciousness: awake and awake and alert  Airway patency: patent  Nausea & Vomiting: no nausea and no vomiting  Complications: no  Cardiovascular status: blood pressure returned to baseline and hemodynamically stable  Respiratory status: acceptable  Hydration status: euvolemic

## 2023-02-17 NOTE — H&P
Update History & Physical    The patient's History and Physical of 2 / 12 / 23 was reviewed with the patient and there were no significant changes. I examined the patient and there were no significant changes from the previous History and Physical.    Vitals:    02/17/23 1055   BP: (!) 143/83   Pulse: 69   Resp: 16   Temp: 98 °F (36.7 °C)   SpO2: 97%     Principal Problem:    Bipolar disorder with depression (HCC)  Resolved Problems:    * No resolved hospital problems. *        Plan: The risk, benefits, expected outcome, and alternative to the recommended procedure have been discussed with the patient. Patient understands and wants to proceed with the procedure.     Electronically signed by Good Reyes MD on 2/17/23 at 12:23 PM EST

## 2023-02-17 NOTE — PLAN OF CARE
Aubrey Najera has been out on the unit watching TV in the evening, at times he will engage with select peers, otherwise he keeps to himself. He was observed pacing the halls, states he was trying to get exercise and work through some anxiety. He reports moderate anxiety and depression. He reports getting poor sleep last night, hopeful for better sleep tonight with addition of Seroquel. He denies SI, HI and hallucinations. He presents as sad and sullen, affect is congruent. He reports good appetite. He verbalized understanding of NPO status after midnight for ECT. Safety checks maintained. Will continue to monitor for changes in behavior and mood. Problem: Risk for Elopement  Goal: Patient will not exit the unit/facility without proper excort  2/16/2023 2216 by Jacob Burks RN  Outcome: Progressing  2/16/2023 1330 by ERNESTINE Almanza - CNS  Outcome: Progressing  Flowsheets (Taken 2/16/2023 1320)  Nursing Interventions for Elopement Risk: Reduce environmental triggers     Problem: Behavior  Goal: Pt/Family maintain appropriate behavior and adhere to behavioral management agreement, if implemented  Description: INTERVENTIONS:  1. Assess patient/family's coping skills and  non-compliant behavior (including use of illegal substances)  2. Notify security of behavior or suspected illegal substances which indicate the need for search of the family and/or belongings  3. Encourage verbalization of thoughts and concerns in a socially appropriate manner  4. Utilize positive, consistent limit setting strategies supporting safety of patient, staff and others  5. Encourage participation in the decision making process about the behavioral management agreement  6. If a visitor's behavior poses a threat to safety call refer to organization policy.   7. Initiate consult with , Psychosocial CNS, Spiritual Care as appropriate  2/16/2023 2216 by Jacob Burks RN  Outcome: Progressing  2/16/2023 1330 by ERNESTINE Almanza - CNS  Outcome: Progressing  Flowsheets (Taken 2/16/2023 1320)  Patient/family maintains appropriate behavior and adheres to behavioral management agreement, if implemented:   Assess patient/familys coping skills and  non-compliant behavior (including use of illegal substances)   Notify security of behavior or suspected illegal substances which indicate the need for search of the patient and/or belongings   Utilize positive, consistent limit setting strategies supporting safety of patient, staff and others     Problem: Depression/Self Harm  Goal: Effect of psychiatric condition will be minimized and patient will be protected from self harm  Description: INTERVENTIONS:  1. Assess impact of patient's symptoms on level of functioning, self care needs and offer support as indicated  2. Assess patient/family knowledge of depression, impact on illness and need for teaching  3. Provide emotional support, presence and reassurance  4. Assess for possible suicidal thoughts or ideation. If patient expresses suicidal thoughts or statements do not leave alone, initiate Suicide Precautions, move to a room close to the nursing station and obtain sitter  5. Initiate consults as appropriate with Mental Health Professional, Spiritual Care, Psychosocial CNS, and consider a recommendation to the LIP for a Psychiatric Consultation  2/16/2023 2216 by Memo Panda RN  Outcome: Progressing  Flowsheets (Taken 2/16/2023 2208)  Effect of psychiatric condition will be minimized and patient will be protected from self harm:   Assess impact of patients symptoms on level of functioning, self care needs and offer support as indicated   Assess patient/family knowledge of depression, impact on illness and need for teaching   Provide emotional support, presence and reassurance   Assess for suicidal thoughts or ideation.  If patient expresses suicidal thoughts or statements do not leave alone, initiate Suicide Precautions, move near nurse station, obtain sitter   Initiate consults as appropriate with Mental Health Professional, Spiritual Care, Psychosocial CNS, and consider a recommendation to the LIP for a Psychiatric Consultation  2/16/2023 1330 by ERNESTINE Zuleta  Outcome: Progressing  Flowsheets  Taken 2/16/2023 1329  Effect of psychiatric condition will be minimized and patient will be protected from self harm:   Assess impact of patients symptoms on level of functioning, self care needs and offer support as indicated   Assess patient/family knowledge of depression, impact on illness and need for teaching  Taken 2/16/2023 1320  Effect of psychiatric condition will be minimized and patient will be protected from self harm:   Assess impact of patients symptoms on level of functioning, self care needs and offer support as indicated   Assess patient/family knowledge of depression, impact on illness and need for teaching   Provide emotional support, presence and reassurance     Problem: Involuntary Admit  Goal: Will cooperate with staff recommendations and doctor's orders and will demonstrate appropriate behavior  Description: INTERVENTIONS:  1. Treat underlying conditions and offer medication as ordered  2. Educate regarding involuntary admission procedures and rules  3.  Contain excessive/inappropriate behavior per unit and hospital policies  [id number] by Eric Bonilla RN  Outcome: Progressing  2/16/2023 1330 by ERNESTINE Zuleta  Outcome: Progressing  Flowsheets (Taken 2/16/2023 1320)  Will cooperate with staff recommendations and doctor's orders and will demonstrate appropriate behavior: Treat underlying conditions and offer medication as ordered     Problem: Pain  Goal: Verbalizes/displays adequate comfort level or baseline comfort level  2/16/2023 2216 by Eric Bonilla RN  Outcome: Progressing  2/16/2023 1330 by ERNESTINE Zuleta  Outcome: Progressing     Problem: Drug Abuse/Detox  Goal: Will have no detox symptoms and will verbalize plan for changing drug-related behavior  Description: INTERVENTIONS:  1. Administer medication as ordered  2. Monitor physical status  3. Provide emotional support with 1:1 interaction with staff  4.  Encourage  recovery focused treatment   2/16/2023 2216 by Doretha Stallings RN  Outcome: Progressing  [address] (Taken 2/16/2023 2208)  Will have no detox symptoms and will verbalize plan for changing drug-related behavior:   Monitor physical status   Provide emotional support with 1:1 interaction with staff  2/16/2023 1330 by Andrew Welch APRN - CNS  Outcome: Progressing  Flowsheets (Taken 2/16/2023 1320)  Will have no detox symptoms and will verbalize plan for changing drug-related behavior: Administer medication as ordered

## 2023-02-17 NOTE — PROGRESS NOTES
Pt. declined to attend the 0900 community meeting, despite staff encouragement.  Goal - \"Get control of my anxiety\" Electronically signed by AURELIA Ellington on 2/17/2023 at 9:56 AM

## 2023-02-17 NOTE — ANESTHESIA PRE PROCEDURE
Department of Anesthesiology  Preprocedure Note       Name:  Magdiel Bhatia   Age:  36 y.o.  :  1980                                          MRN:  [id number]         Date:  2023      Surgeon: * No surgeons listed *    Procedure: Procedure(s):  ECT  Medications prior to admission:   Prior to Admission medications    Medication Sig Start Date End Date Taking? Authorizing Provider   amLODIPine (NORVASC) 10 MG tablet Take 1 tablet by mouth daily 23   Mary Sow MD   valsartan (DIOVAN) 320 MG tablet Take 1 tablet by mouth daily 23  Mary Sow MD   OXcarbazepine (TRILEPTAL) 300 MG tablet Take 1 tablet by mouth 2 times daily 23   Mary Sow MD   traZODone (DESYREL) 50 MG tablet Take 1 tablet by mouth nightly as needed for Sleep 22   ERNESTINE Zafar CNP   Multiple Vitamin (MULTIVITAMIN) TABS tablet Take 1 tablet by mouth daily 22   ERNESTINE Zafar CNP   pantoprazole (PROTONIX) 40 MG tablet Take 1 tablet by mouth every morning (before breakfast) 22   ERNESTINE Zafar CNP   lurasidone (LATUDA) 40 MG TABS tablet Take 1 tablet by mouth daily 22   ERNESTINE Zafar CNP       Current medications:    No current facility-administered medications for this visit. No current outpatient medications on file.      Facility-Administered Medications Ordered in Other Visits   Medication Dose Route Frequency Provider Last Rate Last Admin    QUEtiapine (SEROQUEL) tablet 50 mg  50 mg Oral Nightly Larry Falcon MD   50 mg at 23 2040    ondansetron (ZOFRAN-ODT) disintegrating tablet 4 mg  4 mg Oral Q8H PRN Larry Falcon MD        Or    ondansetron (ZOFRAN) injection 4 mg  4 mg IntraVENous Q6H PRN Larry Falcon MD        ibuprofen (ADVIL;MOTRIN) tablet 600 mg  600 mg Oral Q6H PRN Larry Falcon MD   600 mg at 23 0834    nicotine polacrilex (NICORETTE) gum 4 mg  4 mg Oral Q2H PRN Larry Falcon MD   4 mg at 02/16/23 1351    gabapentin (NEURONTIN) capsule 100 mg  100 mg Oral TID Karl Shell MD   100 mg at 02/16/23 2040    hydrOXYzine (VISTARIL) injection 50 mg  50 mg IntraMUSCular Q6H PRN Carmela Guerrero MD        Or    hydrOXYzine pamoate (VISTARIL) capsule 50 mg  50 mg Oral Q6H PRN Whitney Mantilla MD   50 mg at 02/16/23 0834    valsartan (DIOVAN) tablet 320 mg  320 mg Oral Daily ERNESTINE Howe - CNP   320 mg at 02/16/23 7315    lithium capsule 300 mg  300 mg Oral BID WC Whitney Mantilla MD   300 mg at 02/16/23 1708    amLODIPine (NORVASC) tablet 10 mg  10 mg Oral Daily Whitney Mantilla MD   10 mg at 02/16/23 0834    chlordiazePOXIDE (LIBRIUM) capsule 10 mg  10 mg Oral Q4H PRN Whitney Mantilla MD   10 mg at 02/16/23 2100    Or    chlordiazePOXIDE (LIBRIUM) capsule 25 mg  25 mg Oral Q4H PRN Whitney Mantilla MD   25 mg at 02/15/23 1950    Or    chlordiazePOXIDE (LIBRIUM) capsule 50 mg  50 mg Oral Q2H PRN Whitney Mantilla MD   50 mg at 02/13/23 1020    Or    chlordiazePOXIDE (LIBRIUM) capsule 75 mg  75 mg Oral Q1H PRN Whitney Mantilla MD        Or    LORazepam (ATIVAN) injection 4 mg  4 mg IntraMUSCular Q1H PRN Whitney Mantilla MD        folic acid (FOLVITE) tablet 1 mg  1 mg Oral Daily Whitney Mantilla MD   1 mg at 02/16/23 0834    multivitamin 1 tablet  1 tablet Oral Daily Whitney Mantilla MD   1 tablet at 02/16/23 9052    thiamine tablet 100 mg  100 mg Oral Daily Whitney Mantilla MD   100 mg at 02/16/23 9448    acetaminophen (TYLENOL) tablet 650 mg  650 mg Oral Q4H PRN Whitney Mantilla MD   650 mg at 02/16/23 1351    magnesium hydroxide (MILK OF MAGNESIA) 400 MG/5ML suspension 30 mL  30 mL Oral Daily PRN Whitney Mantilla MD        aluminum & magnesium hydroxide-simethicone (MAALOX) 200-200-20 MG/5ML suspension 30 mL  30 mL Oral PRN Whitney Mantilla MD   30 mL at 02/15/23 1950    haloperidol (HALDOL) tablet 5 mg  5 mg Oral Q6H PRN Carmela Guerrero MD        Or    haloperidol lactate (HALDOL) injection 5 mg  5 mg IntraMUSCular Q6H PRN Ninfa Diaz MD        benztropine mesylate (COGENTIN) injection 2 mg  2 mg IntraMUSCular BID PRN Ninfa Diaz MD        traZODone (DESYREL) tablet 50 mg  50 mg Oral Nightly PRN Ninfa Diaz MD   50 mg at 02/15/23 2051       Allergies:  No Known Allergies    Problem List:    Patient Active Problem List   Diagnosis Code    HTN (hypertension) I10    Hearing loss H91.90    Otitis media H66.90    Tinnitus H93.19    Smoker F17.200    Bipolar disord, crnt episode mixed, severe, w psych features ([address] F31.64    Bipolar depression [address] [address] F31.9    Bipolar disorder with depression ([facility] [address] F31.9       Past Medical History:        Diagnosis Date    Depression     Hypertension 2015    trx with pills x 1 month / patient did not follow up    Smoker        Past Surgical History:        Procedure Laterality Date    AK CREATE EARDRUM OPENING,GEN ANESTH Bilateral 2/8/2018    BILATERAL MYRINGOTOMY WITH VENTILING TUBE INSERTION (PAT DONE 1/22/18) performed by Benjie Best MD at [address] [place] [address]         Social History:    Social History     Tobacco Use    Smoking status: Every Day     Packs/day: 1.00     Years: 20.00     Pack years: 20.00     Types: Cigarettes    Smokeless tobacco: Never   Substance Use Topics    Alcohol use: Not Currently     Comment: 5th of vodka a day                                Ready to quit: Not Answered  Counseling given: Not Answered      Vital Signs (Current): There were no vitals filed for this visit.                                            BP Readings from Last 3 Encounters:   02/17/23 (!) 143/83   12/06/22 120/71   06/01/22 134/74       NPO Status:  NPO after midnight                                                                               BMI:   Wt Readings from Last 3 Encounters:   02/11/23 250 lb (113.4 kg)   12/02/22 250 lb (113.4 kg)   05/24/22 260 lb (117.9 kg)     There is no height or weight on file to calculate BMI.    CBC:   Lab Results   Component Value Date/Time    WBC 7.1 02/11/2023 12:42 PM    RBC 4.68 02/11/2023 12:42 PM    HGB 14.7 02/11/2023 12:42 PM    HCT 42.9 02/11/2023 12:42 PM    MCV 91.7 02/11/2023 12:42 PM    RDW 13.8 02/11/2023 12:42 PM     02/11/2023 12:42 PM       CMP:   Lab Results   Component Value Date/Time     02/11/2023 06:15 PM    K 3.7 02/11/2023 06:15 PM     02/11/2023 06:15 PM    CO2 28 02/11/2023 06:15 PM    BUN 12 02/11/2023 06:15 PM    CREATININE 0.97 02/11/2023 06:15 PM    GFRAA >60.0 05/24/2022 02:14 PM    LABGLOM >60.0 02/11/2023 06:15 PM    GLUCOSE 103 02/11/2023 06:15 PM    PROT 6.5 02/11/2023 12:42 PM    CALCIUM 9.3 02/11/2023 06:15 PM    BILITOT <0.2 02/11/2023 12:42 PM    ALKPHOS 108 02/11/2023 12:42 PM    AST 16 02/11/2023 12:42 PM    ALT 15 02/11/2023 12:42 PM       POC Tests: No results for input(s): POCGLU, POCNA, POCK, POCCL, POCBUN, POCHEMO, POCHCT in the last 72 hours.     Coags:   Lab Results   Component Value Date/Time    PROTIME 9.5 09/29/2013 03:10 AM    INR 0.9 09/29/2013 03:10 AM    APTT 25.9 09/29/2013 03:10 AM       HCG (If Applicable): No results found for: PREGTESTUR, PREGSERUM, HCG, HCGQUANT     ABGs: No results found for: PHART, PO2ART, GCE5AZK, XIJ1YHO, BEART, C7ZLWMZK     Type & Screen (If Applicable):  No results found for: [facility]    Anesthesia Evaluation  Patient summary reviewed and Nursing notes reviewed  Airway: Mallampati: II  TM distance: >3 FB   Neck ROM: full  Mouth opening: > = 3 FB   Dental: normal exam         Pulmonary:normal exam    (+) current smoker                           Cardiovascular:    (+) hypertension:,       ECG reviewed                        Neuro/Psych:   (+) psychiatric history:            GI/Hepatic/Renal: Neg GI/Hepatic/Renal ROS  (+) morbid obesity Endo/Other: Negative Endo/Other ROS                    Abdominal:             Vascular: negative vascular ROS. Other Findings:             Anesthesia Plan      general     ASA 2     (MASK)  Induction: intravenous. MIPS: Postoperative opioids intended and Prophylactic antiemetics administered. Anesthetic plan and risks discussed with patient. Plan discussed with CRNA.     Attending anesthesiologist reviewed and agrees with Pre Eval content                Kb Brown MD   2/17/2023

## 2023-02-17 NOTE — PROGRESS NOTES
Pt return from ect, vs obtained, gag present, pt denied pain denied soreness ,in chest and leg area, reports anxiety # 6

## 2023-02-17 NOTE — PLAN OF CARE
Patient presents anxious and is pacing periphery of the unit. Affect is blunted with an irritable edge. He expressed plan to ask  For more librium. States the anxiety has him thinking he hears his name being called. No SI, no HI and no evidence of hallucinations or delusions. Problem: Risk for Elopement  Goal: Patient will not exit the unit/facility without proper excort  2/17/2023 0954 by ERNESTINE Henry CNS  Outcome: Progressing  Flowsheets (Taken 2/17/2023 0948)  Nursing Interventions for Elopement Risk: Reduce environmental triggers  2/16/2023 2216 by Salvador Segura, NADEGE  Outcome: Progressing     Problem: Behavior  Goal: Pt/Family maintain appropriate behavior and adhere to behavioral management agreement, if implemented  Description: INTERVENTIONS:  1. Assess patient/family's coping skills and  non-compliant behavior (including use of illegal substances)  2. Notify security of behavior or suspected illegal substances which indicate the need for search of the family and/or belongings  3. Encourage verbalization of thoughts and concerns in a socially appropriate manner  4. Utilize positive, consistent limit setting strategies supporting safety of patient, staff and others  5. Encourage participation in the decision making process about the behavioral management agreement  6. If a visitor's behavior poses a threat to safety call refer to organization policy.   7. Initiate consult with , Psychosocial CNS, Spiritual Care as appropriate  2/17/2023 0954 by ERNESTINE Henry  Outcome: Progressing  Flowsheets (Taken 2/17/2023 7868)  Patient/family maintains appropriate behavior and adheres to behavioral management agreement, if implemented:   Assess patient/familys coping skills and  non-compliant behavior (including use of illegal substances)   Notify security of behavior or suspected illegal substances which indicate the need for search of the patient and/or belongings   Encourage verbalization of thoughts and concerns in a socially appropriate manner  2/16/2023 2216 by Conchita Gilliam RN  Outcome: Progressing     Problem: Depression/Self Harm  Goal: Effect of psychiatric condition will be minimized and patient will be protected from self harm  Description: INTERVENTIONS:  1. Assess impact of patient's symptoms on level of functioning, self care needs and offer support as indicated  2. Assess patient/family knowledge of depression, impact on illness and need for teaching  3. Provide emotional support, presence and reassurance  4. Assess for possible suicidal thoughts or ideation. If patient expresses suicidal thoughts or statements do not leave alone, initiate Suicide Precautions, move to a room close to the nursing station and obtain sitter  5.  Initiate consults as appropriate with Mental Health Professional, Spiritual Care, Psychosocial CNS, and consider a recommendation to the LIP for a Psychiatric Consultation  2/17/2023 0954 by ERNESTINE Alfaro - CNS  Outcome: Progressing  Flowsheets  Taken 2/17/2023 0954  Effect of psychiatric condition will be minimized and patient will be protected from self harm:   Assess impact of patients symptoms on level of functioning, self care needs and offer support as indicated   Assess patient/family knowledge of depression, impact on illness and need for teaching  Taken 2/17/2023 0948  Effect of psychiatric condition will be minimized and patient will be protected from self harm:   Assess impact of patients symptoms on level of functioning, self care needs and offer support as indicated   Assess patient/family knowledge of depression, impact on illness and need for teaching  2/16/2023 2216 by Conchita Gilliam RN  Outcome: Progressing  Flowsheets (Taken 2/16/2023 2208)  Effect of psychiatric condition will be minimized and patient will be protected from self harm:   Assess impact of patients symptoms on level of functioning, self care needs and offer support as indicated   Assess patient/family knowledge of depression, impact on illness and need for teaching   Provide emotional support, presence and reassurance   Assess for suicidal thoughts or ideation.  If patient expresses suicidal thoughts or statements do not leave alone, initiate Suicide Precautions, move near nurse station, obtain sitter   Initiate consults as appropriate with Mental Health Professional, Spiritual Care, Psychosocial CNS, and consider a recommendation to the LIP for a Psychiatric Consultation

## 2023-02-17 NOTE — PROGRESS NOTES
Explained all meds, pt aware librium is discontinued and is good about being on the Seroquel, has been on it before, gave vistaril 50 mg and Neurontin 100 mg,

## 2023-02-17 NOTE — CARE COORDINATION
Spoke with patient about where he plans to go upon discharge. Pt stated he would like help finding housing. He would also be willing to go to rehab/sober living since he does have a problem with alcohol.

## 2023-02-18 PROCEDURE — 6370000000 HC RX 637 (ALT 250 FOR IP): Performed by: PSYCHIATRY & NEUROLOGY

## 2023-02-18 PROCEDURE — 1240000000 HC EMOTIONAL WELLNESS R&B

## 2023-02-18 RX ADMIN — FOLIC ACID 1 MG: 1 TABLET ORAL at 09:09

## 2023-02-18 RX ADMIN — GABAPENTIN 100 MG: 100 CAPSULE ORAL at 20:35

## 2023-02-18 RX ADMIN — LITHIUM CARBONATE 450 MG: 300 CAPSULE, GELATIN COATED ORAL at 16:45

## 2023-02-18 RX ADMIN — QUETIAPINE FUMARATE 25 MG: 25 TABLET ORAL at 14:05

## 2023-02-18 RX ADMIN — GABAPENTIN 100 MG: 100 CAPSULE ORAL at 09:09

## 2023-02-18 RX ADMIN — QUETIAPINE 50 MG: 50 TABLET ORAL at 20:35

## 2023-02-18 RX ADMIN — QUETIAPINE FUMARATE 25 MG: 25 TABLET ORAL at 09:10

## 2023-02-18 RX ADMIN — HYDROXYZINE PAMOATE 50 MG: 50 CAPSULE ORAL at 16:44

## 2023-02-18 RX ADMIN — Medication 100 MG: at 09:09

## 2023-02-18 RX ADMIN — LITHIUM CARBONATE 300 MG: 300 CAPSULE, GELATIN COATED ORAL at 09:10

## 2023-02-18 RX ADMIN — AMLODIPINE BESYLATE 10 MG: 10 TABLET ORAL at 09:10

## 2023-02-18 RX ADMIN — VALSARTAN 320 MG: 160 TABLET, FILM COATED ORAL at 09:09

## 2023-02-18 RX ADMIN — GABAPENTIN 100 MG: 100 CAPSULE ORAL at 14:05

## 2023-02-18 RX ADMIN — NICOTINE POLACRILEX 4 MG: 4 GUM, CHEWING BUCCAL at 16:44

## 2023-02-18 RX ADMIN — THERA TABS 1 TABLET: TAB at 09:10

## 2023-02-18 NOTE — PROGRESS NOTES
[facility] FOLLOW-UP NOTE       2/18/2023     Patient was seen and examined in person, Chart reviewed   Patient's case discussed with staff/team    Chief Complaint: depression, SI    Interim History:     Patient said he was feeling \"good\". He said he slept \"OK\" and that his appetite was \"good\". He said his mood was \"better\"; he rated his depression 5/10. He still has thoughts of suicide. He denied homicidal ideation. He denied auditory or visual hallucinations. He denied paranoia or other delusions. He had ECT yesterday and tolerated it well.  His lithium level was 0.3      Appetite:   [x] Normal/Unchanged  [] Increased  [] Decreased      Sleep:       [x] Normal/Unchanged  [] Fair       [] Poor              Energy:    [x] Normal/Unchanged  [] Increased  [] Decreased        SI [] Present  [x] Absent    HI  []Present  [x] Absent     Aggression:  [] yes  [x] no    Patient is [x] able  [] unable to CONTRACT FOR SAFETY     PAST MEDICAL/PSYCHIATRIC HISTORY:   Past Medical History:   Diagnosis Date    Depression     Hypertension 2015    trx with pills x 1 month / patient did not follow up    Smoker        FAMILY/SOCIAL HISTORY:  Family History   Problem Relation Age of Onset    High Blood Pressure Mother     Heart Disease Mother     Other Mother         copd / smoker    Stroke Father     High Blood Pressure Brother     Other Brother         anxiety     Social History     Socioeconomic History    Marital status: Single     Spouse name: Not on file    Number of children: Not on file    Years of education: Not on file    Highest education level: Not on file   Occupational History    Not on file   Tobacco Use    Smoking status: Every Day     Packs/day: 1.00     Years: 20.00     Pack years: 20.00     Types: Cigarettes    Smokeless tobacco: Never   Substance and Sexual Activity    Alcohol use: Not Currently     Comment: 5th of vodka a day    Drug use: Yes     Types: Marijuana [employer]), Opiates     Sexual activity: Not on file   Other Topics Concern    Not on file   Social History Narrative    Not on file     Social Determinants of Health     Financial Resource Strain: Low Risk     Difficulty of Paying Living Expenses: Not hard at all   Food Insecurity: No Food Insecurity    Worried About Running Out of Food in the Last Year: Never true    Ran Out of Food in the Last Year: Never true   Transportation Needs: Not on file   Physical Activity: Not on file   Stress: Not on file   Social Connections: Not on file   Intimate Partner Violence: Not on file   Housing Stability: Not on file           ROS:  [x] All negative/unchanged except if checked.  Explain positive(checked items) below:  [] Constitutional  [] Eyes  [] Ear/Nose/Mouth/Throat  [] Respiratory  [] CV  [] GI  []   [] Musculoskeletal  [] Skin/Breast  [] Neurological  [] Endocrine  [] Heme/Lymph  [] Allergic/Immunologic    Explanation:     MEDICATIONS:    Current Facility-Administered Medications:     lithium capsule 450 mg, 450 mg, Oral, BID WC, Iveth Hwang MD    QUEtiapine (SEROQUEL) tablet 25 mg, 25 mg, Oral, BID, Srini Wagner MD, 25 mg at 02/18/23 1405    QUEtiapine (SEROQUEL) tablet 50 mg, 50 mg, Oral, Nightly, Srini Wagner MD, 50 mg at 02/17/23 2102    ondansetron (ZOFRAN-ODT) disintegrating tablet 4 mg, 4 mg, Oral, Q8H PRN **OR** ondansetron (ZOFRAN) injection 4 mg, 4 mg, IntraVENous, Q6H PRN, Srini Wagner MD    ibuprofen (ADVIL;MOTRIN) tablet 600 mg, 600 mg, Oral, Q6H PRN, Srini Wagner MD, 600 mg at 02/16/23 0834    nicotine polacrilex (NICORETTE) gum 4 mg, 4 mg, Oral, Q2H PRN, Srini Wagner MD, 4 mg at 02/17/23 1326    gabapentin (NEURONTIN) capsule 100 mg, 100 mg, Oral, TID, Srini Wagner MD, 100 mg at 02/18/23 1405    hydrOXYzine (VISTARIL) injection 50 mg, 50 mg, IntraMUSCular, Q6H PRN **OR** hydrOXYzine pamoate (VISTARIL) capsule 50 mg, 50 mg, Oral, Q6H PRN, Srini Wagner MD, 50 mg at 02/17/23 2102    valsartan (DIOVAN) tablet 320 mg, 320 mg, Oral, Daily, Colin De La Vega MD, 320 mg at 02/18/23 0909    amLODIPine (NORVASC) tablet 10 mg, 10 mg, Oral, Daily, Colin De La Vega MD, 10 mg at 02/53/30 3583    folic acid (FOLVITE) tablet 1 mg, 1 mg, Oral, Daily, Colin De La Vega MD, 1 mg at 02/18/23 4424    multivitamin 1 tablet, 1 tablet, Oral, Daily, Colin De La Vega MD, 1 tablet at 02/18/23 0910    thiamine tablet 100 mg, 100 mg, Oral, Daily, Colin De La Vega MD, 100 mg at 02/18/23 6754    acetaminophen (TYLENOL) tablet 650 mg, 650 mg, Oral, Q4H PRN, Colin De La Vega MD, 650 mg at 02/16/23 1351    magnesium hydroxide (MILK OF MAGNESIA) 400 MG/5ML suspension 30 mL, 30 mL, Oral, Daily PRN, Colin De La Vega MD    aluminum & magnesium hydroxide-simethicone (MAALOX) 200-200-20 MG/5ML suspension 30 mL, 30 mL, Oral, PRN, Colin De La Vega MD, 30 mL at 02/15/23 1950    haloperidol (HALDOL) tablet 5 mg, 5 mg, Oral, Q6H PRN **OR** haloperidol lactate (HALDOL) injection 5 mg, 5 mg, IntraMUSCular, Q6H PRN, Colin De La Vega MD    benztropine mesylate (COGENTIN) injection 2 mg, 2 mg, IntraMUSCular, BID PRN, Colin De La Vega MD    traZODone (DESYREL) tablet 50 mg, 50 mg, Oral, Nightly PRN, Colin De La Vega MD, 50 mg at 02/15/23 2051      Examination:  /78   Pulse 60   Temp 97.7 °F (36.5 °C) (Oral)   Resp 20   Ht 6' 4\" (1.93 m)   Wt 250 lb (113.4 kg)   SpO2 100%   BMI 30.43 kg/m²   Gait - steady  Medication side effects(SE): denies     Mental Status Examination:    Level of consciousness:  within normal limits   Appearance:  fair grooming and fair hygiene  Behavior/Motor:  no abnormalities noted  Attitude toward examiner:  cooperative, attentive, and good eye contact  Speech:  spontaneous, normal rate, and monotone  Mood: depressed decreased range but reported to be improving  Affect:  flat  Thought processes: less circumstantial   Thought content:  Suicidal Ideation:  denies; denies homicidal ideation  Delusions:  no evidence of delusions  Perceptual Disturbance:  denies any perceptual disturbance  Cognition:  oriented to person, place, and time   Concentration distractible  Insight fair   Judgement fair     ASSESSMENT:   Patient symptoms are:  [] Well controlled  [x] Improving  [] Worsening  [] No change      Diagnosis:   Principal Problem:    Bipolar disorder with depression ([facility] [address]  Resolved Problems:    * No resolved hospital problems. *      LABS:    No results for input(s): WBC, HGB, PLT in the last 72 hours. No results for input(s): NA, K, CL, CO2, BUN, CREATININE, GLUCOSE in the last 72 hours. No results for input(s): BILITOT, ALKPHOS, AST, ALT in the last 72 hours. Lab Results   Component Value Date/Time    LABAMPH Neg 02/11/2023 11:30 AM    BARBSCNU Neg 02/11/2023 11:30 AM    LABBENZ Neg 02/11/2023 11:30 AM    LABMETH Neg 02/11/2023 11:30 AM    OPIATESCREENURINE Neg 02/11/2023 11:30 AM    PHENCYCLIDINESCREENURINE Neg 02/11/2023 11:30 AM    ETOH 82 02/11/2023 06:15 PM     Lab Results   Component Value Date/Time    TSH 0.805 02/11/2023 12:42 PM     Lab Results   Component Value Date    LITHIUM 0.3 (L) 02/17/2023     Lab Results   Component Value Date    VALPROATE 13.5 (L) 12/02/2022       RISK ASSESSMENT: low    Treatment Plan:  Reviewed current Medications with the patient. Monitor efficacy and tolerability of current med regime  ECT Monday  Increase lithium dose to 450 mg bid  Risks, benefits, side effects, drug-to-drug interactions and alternatives to treatment were discussed. Collateral information: see social work notes  CD evaluation ETOH   Encourage patient to attend group and other milieu activities.   Discharge planning discussed with the patient and treatment team; tentative DC next week pending stability     PSYCHOTHERAPY/COUNSELING:  [x] Therapeutic interview  [x] Supportive  [] CBT  [] Ongoing  [] Other    [x] Patient continues to need, on a daily basis, active treatment furnished directly by or requiring the supervision of inpatient psychiatric personnel      Anticipated Length of stay: 5 days       Electronically signed by Floyd Casey MD on 2/18/2023 at 3:40 PM

## 2023-02-18 NOTE — CARE COORDINATION
2/18/23 @ 2:15     Patient agreed to speak with LGR. Patient needs to sign JOVANY    will call [facility] after consent is signed.

## 2023-02-18 NOTE — PLAN OF CARE
Pt visible in the dayroom, watching tv, social with select peers. Denies SI, HI, AVH. Rates anxiety 5/10, depression 7/10. Pt states \"I feel better\" and that ECT is helping his mood. Reports thinking about \"everything\" but declined to elaborate. Appears flat, withdrawn. Reports sleeping well, good appetite. Currently in dayroom watching tv. Problem: Risk for Elopement  Goal: Patient will not exit the unit/facility without proper excort  2/18/2023 0909 by Judson Mendoza RN  Outcome: Progressing  Flowsheets (Taken 2/18/2023 0900)  Nursing Interventions for Elopement Risk:   Make sure patient has all necessary personal care items   Reduce environmental triggers  2/18/2023 0123 by Kanwal Boothe RN  Outcome: Progressing     Problem: Behavior  Goal: Pt/Family maintain appropriate behavior and adhere to behavioral management agreement, if implemented  Description: INTERVENTIONS:  1. Assess patient/family's coping skills and  non-compliant behavior (including use of illegal substances)  2. Notify security of behavior or suspected illegal substances which indicate the need for search of the family and/or belongings  3. Encourage verbalization of thoughts and concerns in a socially appropriate manner  4. Utilize positive, consistent limit setting strategies supporting safety of patient, staff and others  5. Encourage participation in the decision making process about the behavioral management agreement  6. If a visitor's behavior poses a threat to safety call refer to organization policy.   7. Initiate consult with , Psychosocial CNS, Spiritual Care as appropriate  2/18/2023 0909 by Judson Mendoza RN  Outcome: Progressing  Flowsheets (Taken 2/18/2023 0900)  Patient/family maintains appropriate behavior and adheres to behavioral management agreement, if implemented:   Utilize positive, consistent limit setting strategies supporting safety of patient, staff and others   Encourage participation in the decision making process about the behavioral management agreement  2/18/2023 0123 by Grazyna Dobbs RN  Outcome: Progressing     Problem: Depression/Self Harm  Goal: Effect of psychiatric condition will be minimized and patient will be protected from self harm  Description: INTERVENTIONS:  1. Assess impact of patient's symptoms on level of functioning, self care needs and offer support as indicated  2. Assess patient/family knowledge of depression, impact on illness and need for teaching  3. Provide emotional support, presence and reassurance  4. Assess for possible suicidal thoughts or ideation. If patient expresses suicidal thoughts or statements do not leave alone, initiate Suicide Precautions, move to a room close to the nursing station and obtain sitter  5. Initiate consults as appropriate with Mental Health Professional, Spiritual Care, Psychosocial CNS, and consider a recommendation to the LIP for a Psychiatric Consultation  2/18/2023 0909 by Daryl Johnson RN  Outcome: Progressing  Flowsheets  Taken 2/18/2023 0900 by Daryl Johnson RN  Effect of psychiatric condition will be minimized and patient will be protected from self harm:   Provide emotional support, presence and reassurance   Assess for suicidal thoughts or ideation.  If patient expresses suicidal thoughts or statements do not leave alone, initiate Suicide Precautions, move near nurse station, obtain sitter  Taken 2/18/2023 0150 by Grazyna Dobbs RN  Effect of psychiatric condition will be minimized and patient will be protected from self harm:   Provide emotional support, presence and reassurance   Assess impact of patients symptoms on level of functioning, self care needs and offer support as indicated  2/18/2023 0123 by Grazyna Dobbs RN  Outcome: Progressing     Problem: Involuntary Admit  Goal: Will cooperate with staff recommendations and doctor's orders and will demonstrate appropriate behavior  Description: INTERVENTIONS:  1. Treat underlying conditions and offer medication as ordered  2. Educate regarding involuntary admission procedures and rules  3. Contain excessive/inappropriate behavior per unit and hospital policies  9/03/8938 0623 by Carola James RN  Outcome: Progressing  Flowsheets (Taken 2/18/2023 0900)  Will cooperate with staff recommendations and doctor's orders and will demonstrate appropriate behavior: Contain excessive/inappropriate behavior per unit and hospital policies  1/80/2563 6289 by Rob Chand RN  Outcome: Progressing     Problem: Pain  Goal: Verbalizes/displays adequate comfort level or baseline comfort level  2/18/2023 0909 by Carola James RN  Outcome: Progressing  2/18/2023 0123 by Rob Chand RN  Outcome: Progressing     Problem: Drug Abuse/Detox  Goal: Will have no detox symptoms and will verbalize plan for changing drug-related behavior  Description: INTERVENTIONS:  1. Administer medication as ordered  2. Monitor physical status  3. Provide emotional support with 1:1 interaction with staff  4.  Encourage  recovery focused treatment   2/18/2023 0909 by Carola James RN  Outcome: Progressing  Flowsheets (Taken 2/18/2023 0900)  Will have no detox symptoms and will verbalize plan for changing drug-related behavior:   Encourage recovery focused treatment   Monitor physical status   Provide emotional support with 1:1 interaction with staff  2/18/2023 0123 by Rob Chand RN  Outcome: Progressing

## 2023-02-18 NOTE — PLAN OF CARE
Pt resting in room throughout evening with eyes closed. Pt out for snack/ meds. Pt has good appetite. Appears drowsy. Pt reports feeling that ECT is effective and that his depression and anxiety is improving with treatment. Denies SI, HI, or AVH. No further requests for librium or complaints of extreme anxiety/ voices. Reports good sleep. Problem: Risk for Elopement  Goal: Patient will not exit the unit/facility without proper excort  Outcome: Progressing     Problem: Behavior  Goal: Pt/Family maintain appropriate behavior and adhere to behavioral management agreement, if implemented  Description: INTERVENTIONS:  1. Assess patient/family's coping skills and  non-compliant behavior (including use of illegal substances)  2. Notify security of behavior or suspected illegal substances which indicate the need for search of the family and/or belongings  3. Encourage verbalization of thoughts and concerns in a socially appropriate manner  4. Utilize positive, consistent limit setting strategies supporting safety of patient, staff and others  5. Encourage participation in the decision making process about the behavioral management agreement  6. If a visitor's behavior poses a threat to safety call refer to organization policy. 7. Initiate consult with , Psychosocial CNS, Spiritual Care as appropriate  Outcome: Progressing     Problem: Depression/Self Harm  Goal: Effect of psychiatric condition will be minimized and patient will be protected from self harm  Description: INTERVENTIONS:  1. Assess impact of patient's symptoms on level of functioning, self care needs and offer support as indicated  2. Assess patient/family knowledge of depression, impact on illness and need for teaching  3. Provide emotional support, presence and reassurance  4. Assess for possible suicidal thoughts or ideation.  If patient expresses suicidal thoughts or statements do not leave alone, initiate Suicide Precautions, move to a room close to the nursing station and obtain sitter  5. Initiate consults as appropriate with Mental Health Professional, Spiritual Care, Psychosocial CNS, and consider a recommendation to the LIP for a Psychiatric Consultation  Outcome: Progressing     Problem: Involuntary Admit  Goal: Will cooperate with staff recommendations and doctor's orders and will demonstrate appropriate behavior  Description: INTERVENTIONS:  1. Treat underlying conditions and offer medication as ordered  2. Educate regarding involuntary admission procedures and rules  3. Contain excessive/inappropriate behavior per unit and hospital policies  Outcome: Progressing     Problem: Pain  Goal: Verbalizes/displays adequate comfort level or baseline comfort level  Outcome: Progressing     Problem: Drug Abuse/Detox  Goal: Will have no detox symptoms and will verbalize plan for changing drug-related behavior  Description: INTERVENTIONS:  1. Administer medication as ordered  2. Monitor physical status  3. Provide emotional support with 1:1 interaction with staff  4.  Encourage  recovery focused treatment   Outcome: Progressing

## 2023-02-18 NOTE — PROGRESS NOTES
Morning Community Meeting Topics    Judi Honorio attended the morning community meeting on 2/18/23. Topics discussed today     [x] Introduction  Day of the week and date  Mask distribution  Current mask requirements  [x]Teams  Explanation of  Green and Blue team criteria  Nurses assigned to each team for today  Explanation about green and blue paper  Date  Patient's Name  Patient's Nurse  Goals  [x] Visitation  Announce the visiting hours for the day  Announce which team is allowed to have visitors for the day  Review any updated Covid 19 requirements for visitors during visitation  Vaccine Card or negative Covid test within 48 hours of visit  State Identification  Patients are reminded to alert the  at least 1 hour before visitation   [x] Unit Orientation  Coffee use  Phone location and etiquette  Shower locations  [place] and dryer location and process  Common area expectations  Staff rounds expectation  [x] Meals   Educate patient to the menu  The patient is encouraged to fill out the menu to get preferences at mealtime  The patient is educated that if they do not fill out the menu, they will get the standard tray  The coffee pot is decaf, patient encouraged to order regular coffee from menu.   Educate patient to the meal process  Patient encouraged to eat snacks provided twice daily  Snacks may stay in patient room     [x] Discharge Process  Discharge expectations  Fill out the survey after discharge   [x] Hygiene  Daily showers encouraged  Showers availability discussed   Daily dressing encouraged  Discussed wearing street clothing  Education provided on where to place linens and clothing  Linens in the hamper  personal clothing does not go into the linen hamper  [x] Group   Patient encouraged to attend group provided  Time of Group Meetings discussed  Gentle reminder that attendance is a Physician order  [x] Movement  Chair exercises completed  Stretching completed  Notes:Goal - \"Stay positive\" Electronically signed by Rafia Payan, [address] on 2/18/2023 at 9:52 AM

## 2023-02-18 NOTE — PROGRESS NOTES
Pt. refused to attend the 1000 skills group, despite staff encouragement.  Electronically signed by Sherlyn Rivera, [address] on 2/18/2023 at 2:14 PM

## 2023-02-19 VITALS
TEMPERATURE: 97.5 F | HEART RATE: 60 BPM | DIASTOLIC BLOOD PRESSURE: 83 MMHG | BODY MASS INDEX: 30.44 KG/M2 | HEIGHT: 76 IN | SYSTOLIC BLOOD PRESSURE: 143 MMHG | WEIGHT: 250 LBS | OXYGEN SATURATION: 100 % | RESPIRATION RATE: 20 BRPM

## 2023-02-19 PROCEDURE — 6370000000 HC RX 637 (ALT 250 FOR IP): Performed by: PSYCHIATRY & NEUROLOGY

## 2023-02-19 PROCEDURE — 1240000000 HC EMOTIONAL WELLNESS R&B

## 2023-02-19 RX ORDER — QUETIAPINE FUMARATE 100 MG/1
100 TABLET, FILM COATED ORAL NIGHTLY
Status: DISCONTINUED | OUTPATIENT
Start: 2023-02-19 | End: 2023-02-24 | Stop reason: HOSPADM

## 2023-02-19 RX ADMIN — NICOTINE POLACRILEX 4 MG: 4 GUM, CHEWING BUCCAL at 10:22

## 2023-02-19 RX ADMIN — NICOTINE POLACRILEX 4 MG: 4 GUM, CHEWING BUCCAL at 20:45

## 2023-02-19 RX ADMIN — HYDROXYZINE PAMOATE 50 MG: 50 CAPSULE ORAL at 17:48

## 2023-02-19 RX ADMIN — QUETIAPINE FUMARATE 100 MG: 100 TABLET ORAL at 20:44

## 2023-02-19 RX ADMIN — VALSARTAN 320 MG: 160 TABLET, FILM COATED ORAL at 08:34

## 2023-02-19 RX ADMIN — GABAPENTIN 100 MG: 100 CAPSULE ORAL at 14:50

## 2023-02-19 RX ADMIN — LITHIUM CARBONATE 450 MG: 300 CAPSULE, GELATIN COATED ORAL at 08:34

## 2023-02-19 RX ADMIN — QUETIAPINE FUMARATE 25 MG: 25 TABLET ORAL at 14:50

## 2023-02-19 RX ADMIN — GABAPENTIN 100 MG: 100 CAPSULE ORAL at 08:35

## 2023-02-19 RX ADMIN — Medication 100 MG: at 08:34

## 2023-02-19 RX ADMIN — FOLIC ACID 1 MG: 1 TABLET ORAL at 08:34

## 2023-02-19 RX ADMIN — LITHIUM CARBONATE 450 MG: 300 CAPSULE, GELATIN COATED ORAL at 17:13

## 2023-02-19 RX ADMIN — HYDROXYZINE PAMOATE 50 MG: 50 CAPSULE ORAL at 10:22

## 2023-02-19 RX ADMIN — NICOTINE POLACRILEX 4 MG: 4 GUM, CHEWING BUCCAL at 17:48

## 2023-02-19 RX ADMIN — THERA TABS 1 TABLET: TAB at 08:34

## 2023-02-19 RX ADMIN — AMLODIPINE BESYLATE 10 MG: 10 TABLET ORAL at 08:34

## 2023-02-19 RX ADMIN — QUETIAPINE FUMARATE 25 MG: 25 TABLET ORAL at 08:34

## 2023-02-19 RX ADMIN — GABAPENTIN 100 MG: 100 CAPSULE ORAL at 20:44

## 2023-02-19 NOTE — PLAN OF CARE
Pt has been visible in the dayroom, sitting with peers watching tv. Reports good sleep and good appetite. States that he \"feels better\" including his anxiety and depression. Denies SI, HI, AVH. Verbalizes that he is intending on talking to social work sometime within the week to figure out what he's doing once discharged but says \"I'm not going to stress myself out about it right now. \" Appears flat, withdrawn upon assessment. Currently watching tv in the dayroom. Problem: Risk for Elopement  Goal: Patient will not exit the unit/facility without proper excort  2/19/2023 0956 by Vanessa Schmidt RN  Outcome: Progressing  Flowsheets (Taken 2/19/2023 0949)  Nursing Interventions for Elopement Risk:   Make sure patient has all necessary personal care items   Reduce environmental triggers  2/18/2023 2055 by Alla Bryan RN  Outcome: Progressing     Problem: Behavior  Goal: Pt/Family maintain appropriate behavior and adhere to behavioral management agreement, if implemented  Description: INTERVENTIONS:  1. Assess patient/family's coping skills and  non-compliant behavior (including use of illegal substances)  2. Notify security of behavior or suspected illegal substances which indicate the need for search of the family and/or belongings  3. Encourage verbalization of thoughts and concerns in a socially appropriate manner  4. Utilize positive, consistent limit setting strategies supporting safety of patient, staff and others  5. Encourage participation in the decision making process about the behavioral management agreement  6. If a visitor's behavior poses a threat to safety call refer to organization policy.   7. Initiate consult with , Psychosocial CNS, Spiritual Care as appropriate  2/19/2023 0956 by Vanessa Schmidt RN  Outcome: Progressing  Flowsheets (Taken 2/19/2023 6118)  Patient/family maintains appropriate behavior and adheres to behavioral management agreement, if implemented: Encourage verbalization of thoughts and concerns in a socially appropriate manner  2/18/2023 2055 by Pablo Field RN  Outcome: Progressing     Problem: Depression/Self Harm  Goal: Effect of psychiatric condition will be minimized and patient will be protected from self harm  Description: INTERVENTIONS:  1. Assess impact of patient's symptoms on level of functioning, self care needs and offer support as indicated  2. Assess patient/family knowledge of depression, impact on illness and need for teaching  3. Provide emotional support, presence and reassurance  4. Assess for possible suicidal thoughts or ideation. If patient expresses suicidal thoughts or statements do not leave alone, initiate Suicide Precautions, move to a room close to the nursing station and obtain sitter  5. Initiate consults as appropriate with Mental Health Professional, Spiritual Care, Psychosocial CNS, and consider a recommendation to the LIP for a Psychiatric Consultation  2/19/2023 0956 by Chris Guerrero RN  Outcome: Progressing  Flowsheets  Taken 2/19/2023 0949 by Chris Guerrero RN  Effect of psychiatric condition will be minimized and patient will be protected from self harm: Provide emotional support, presence and reassurance  Taken 2/18/2023 2353 by Pablo Field RN  Effect of psychiatric condition will be minimized and patient will be protected from self harm: Assess impact of patients symptoms on level of functioning, self care needs and offer support as indicated  2/18/2023 2055 by Pablo Field RN  Outcome: Progressing     Problem: Involuntary Admit  Goal: Will cooperate with staff recommendations and doctor's orders and will demonstrate appropriate behavior  Description: INTERVENTIONS:  1. Treat underlying conditions and offer medication as ordered  2. Educate regarding involuntary admission procedures and rules  3.  Contain excessive/inappropriate behavior per unit and hospital policies  5/33/6227 8611 by Chris Guerrero RN  Outcome: Progressing  Flowsheets (Taken 2/19/2023 4075)  Will cooperate with staff recommendations and doctor's orders and will demonstrate appropriate behavior: Contain excessive/inappropriate behavior per unit and hospital policies  3/13/2578 2708 by Elvin Lowery RN  Outcome: Progressing     Problem: Pain  Goal: Verbalizes/displays adequate comfort level or baseline comfort level  2/19/2023 0956 by Iván Russell RN  Outcome: Progressing  2/18/2023 2055 by Elvin Lowery RN  Outcome: Progressing     Problem: Drug Abuse/Detox  Goal: Will have no detox symptoms and will verbalize plan for changing drug-related behavior  Description: INTERVENTIONS:  1. Administer medication as ordered  2. Monitor physical status  3. Provide emotional support with 1:1 interaction with staff  4.  Encourage  recovery focused treatment   2/19/2023 0956 by Iván Russell RN  Outcome: Progressing  Flowsheets (Taken 2/19/2023 9740)  Will have no detox symptoms and will verbalize plan for changing drug-related behavior: Provide emotional support with 1:1 interaction with staff  2/18/2023 2055 by Elvin Lowery RN  Outcome: Progressing

## 2023-02-19 NOTE — PROGRESS NOTES
[facility]. FOLLOW-UP NOTE       2/19/2023     Patient was seen and examined in person, Chart reviewed   Patient's case discussed with staff/team    Chief Complaint: depression, SI    Interim History:     Patient said he was feeling \"not too bad\". He said he slept \"OK\" but had nightmares, and that his appetite was \"good\". He denied having thoughts of suicide. He denied homicidal ideation. He said he had been hearing someone calling his name. He denied paranoia or other delusions. He had ECT Friday and tolerated it well.        Appetite:   [x] Normal/Unchanged  [] Increased  [] Decreased      Sleep:       [x] Normal/Unchanged  [] Fair       [] Poor              Energy:    [x] Normal/Unchanged  [] Increased  [] Decreased        SI [] Present  [x] Absent    HI  []Present  [x] Absent     Aggression:  [] yes  [x] no    Patient is [x] able  [] unable to CONTRACT FOR SAFETY     PAST MEDICAL/PSYCHIATRIC HISTORY:   Past Medical History:   Diagnosis Date    Depression     Hypertension 2015    trx with pills x 1 month / patient did not follow up    Smoker        FAMILY/SOCIAL HISTORY:  Family History   Problem Relation Age of Onset    High Blood Pressure Mother     Heart Disease Mother     Other Mother         copd / smoker    Stroke Father     High Blood Pressure Brother     Other Brother         anxiety     Social History     Socioeconomic History    Marital status: Single     Spouse name: Not on file    Number of children: Not on file    Years of education: Not on file    Highest education level: Not on file   Occupational History    Not on file   Tobacco Use    Smoking status: Every Day     Packs/day: 1.00     Years: 20.00     Pack years: 20.00     Types: Cigarettes    Smokeless tobacco: Never   Substance and Sexual Activity    Alcohol use: Not Currently     Comment: 5th of vodka a day    Drug use: Yes     Types: Marijuana (Josué Naval), Opiates     Sexual activity: Not on file   Other Topics Concern    Not on file   Social History Narrative    Not on file     Social Determinants of Health     Financial Resource Strain: Low Risk     Difficulty of Paying Living Expenses: Not hard at all   Food Insecurity: No Food Insecurity    Worried About Running Out of Food in the Last Year: Never true    Ran Out of Food in the Last Year: Never true   Transportation Needs: Not on file   Physical Activity: Not on file   Stress: Not on file   Social Connections: Not on file   Intimate Partner Violence: Not on file   Housing Stability: Not on file           ROS:  [x] All negative/unchanged except if checked.  Explain positive(checked items) below:  [] Constitutional  [] Eyes  [] Ear/Nose/Mouth/Throat  [] Respiratory  [] CV  [] GI  []   [] Musculoskeletal  [] Skin/Breast  [] Neurological  [] Endocrine  [] Heme/Lymph  [] Allergic/Immunologic    Explanation:     MEDICATIONS:    Current Facility-Administered Medications:     QUEtiapine (SEROQUEL) tablet 100 mg, 100 mg, Oral, Nightly, Marie Hoang MD    lithium capsule 450 mg, 450 mg, Oral, BID WC, Marie Hoang MD, 450 mg at 02/19/23 0834    QUEtiapine (SEROQUEL) tablet 25 mg, 25 mg, Oral, BID, Christofer Acuña MD, 25 mg at 02/19/23 1450    ondansetron (ZOFRAN-ODT) disintegrating tablet 4 mg, 4 mg, Oral, Q8H PRN **OR** ondansetron (ZOFRAN) injection 4 mg, 4 mg, IntraVENous, Q6H PRN, Christofer Acuña MD    ibuprofen (ADVIL;MOTRIN) tablet 600 mg, 600 mg, Oral, Q6H PRN, Christofer Acuña MD, 600 mg at 02/16/23 0834    nicotine polacrilex (NICORETTE) gum 4 mg, 4 mg, Oral, Q2H PRN, Christofer Acuña MD, 4 mg at 02/19/23 1022    gabapentin (NEURONTIN) capsule 100 mg, 100 mg, Oral, TID, Christofer Acuña MD, 100 mg at 02/19/23 1450    hydrOXYzine (VISTARIL) injection 50 mg, 50 mg, IntraMUSCular, Q6H PRN **OR** hydrOXYzine pamoate (VISTARIL) capsule 50 mg, 50 mg, Oral, Q6H PRN, Christofer Acuña MD, 50 mg at 02/19/23 1022    valsartan (DIOVAN) tablet 320 mg, 320 mg, Oral, Daily, Flora Natarajan MD, 320 mg at 02/19/23 0834    amLODIPine (NORVASC) tablet 10 mg, 10 mg, Oral, Daily, Flora Natarajan MD, 10 mg at 60/13/99 5681    folic acid (FOLVITE) tablet 1 mg, 1 mg, Oral, Daily, Flora Natarajan MD, 1 mg at 02/19/23 6398    multivitamin 1 tablet, 1 tablet, Oral, Daily, Flora Natarajan MD, 1 tablet at 02/19/23 7624    thiamine tablet 100 mg, 100 mg, Oral, Daily, Flora Natarajan MD, 100 mg at 02/19/23 5514    acetaminophen (TYLENOL) tablet 650 mg, 650 mg, Oral, Q4H PRN, Flora Natarajan MD, 650 mg at 02/16/23 1351    magnesium hydroxide (MILK OF MAGNESIA) 400 MG/5ML suspension 30 mL, 30 mL, Oral, Daily PRN, Flora Natarajan MD    aluminum & magnesium hydroxide-simethicone (MAALOX) 200-200-20 MG/5ML suspension 30 mL, 30 mL, Oral, PRN, Flora Natarajan MD, 30 mL at 02/15/23 1950    haloperidol (HALDOL) tablet 5 mg, 5 mg, Oral, Q6H PRN **OR** haloperidol lactate (HALDOL) injection 5 mg, 5 mg, IntraMUSCular, Q6H PRN, Flora Natarajan MD    benztropine mesylate (COGENTIN) injection 2 mg, 2 mg, IntraMUSCular, BID PRN, Flora Natarajan MD    traZODone (DESYREL) tablet 50 mg, 50 mg, Oral, Nightly PRN, Flora Natarajan MD, 50 mg at 02/15/23 2051      Examination:  BP (!) 143/83 Comment: RN notified  Pulse 60   Temp 97.5 °F (36.4 °C) (Oral)   Resp 20   Ht 6' 4\" (1.93 m)   Wt 250 lb (113.4 kg)   SpO2 100%   BMI 30.43 kg/m²   Gait - steady  Medication side effects(SE): denies     Mental Status Examination:    Level of consciousness:  within normal limits   Appearance:  fair grooming and fair hygiene  Behavior/Motor:  no abnormalities noted  Attitude toward examiner:  cooperative, attentive, and good eye contact  Speech:  spontaneous, normal rate, and monotone  Mood: depressed decreased range but reported to be improving  Affect:  flat  Thought processes: less circumstantial   Thought content:  Suicidal Ideation:  denies; denies homicidal ideation  Delusions:  no evidence of delusions  Perceptual Disturbance: ?auditory hallucinations of his name being called  Cognition:  oriented to person, place, and time   Concentration distractible  Insight fair   Judgement fair     ASSESSMENT:   Patient symptoms are:  [] Well controlled  [x] Improving  [] Worsening  [] No change      Diagnosis:   Principal Problem:    Bipolar disorder with depression ([facility] [address]  Resolved Problems:    * No resolved hospital problems. *      LABS:    No results for input(s): WBC, HGB, PLT in the last 72 hours. No results for input(s): NA, K, CL, CO2, BUN, CREATININE, GLUCOSE in the last 72 hours. No results for input(s): BILITOT, ALKPHOS, AST, ALT in the last 72 hours. Lab Results   Component Value Date/Time    LABAMPH Neg 02/11/2023 11:30 AM    BARBSCNU Neg 02/11/2023 11:30 AM    LABBENZ Neg 02/11/2023 11:30 AM    LABMETH Neg 02/11/2023 11:30 AM    OPIATESCREENURINE Neg 02/11/2023 11:30 AM    PHENCYCLIDINESCREENURINE Neg 02/11/2023 11:30 AM    ETOH 82 02/11/2023 06:15 PM     Lab Results   Component Value Date/Time    TSH 0.805 02/11/2023 12:42 PM     Lab Results   Component Value Date    LITHIUM 0.3 (L) 02/17/2023     Lab Results   Component Value Date    VALPROATE 13.5 (L) 12/02/2022       RISK ASSESSMENT: low    Treatment Plan:  Reviewed current Medications with the patient. Monitor efficacy and tolerability of current med regime  ECT Monday  Risks, benefits, side effects, drug-to-drug interactions and alternatives to treatment were discussed. Collateral information: see social work notes  CD evaluation ETOH   Encourage patient to attend group and other milieu activities.   Discharge planning discussed with the patient and treatment team; tentative DC next week pending stability     PSYCHOTHERAPY/COUNSELING:  [x] Therapeutic interview  [x] Supportive  [] CBT  [] Ongoing  [] Other    [x] Patient continues to need, on a daily basis, active treatment furnished directly by or requiring the supervision of inpatient psychiatric personnel      Anticipated Length of stay: 5 days       Electronically signed by Nelly Giraldo MD on 2/19/2023 at 4:44 PM

## 2023-02-19 NOTE — PLAN OF CARE
Pt up ad brittany pleasant on approach, interactive with staff and peers. Denies SI, HI, AVH. Rates anxiety 4/10, depression 6/10. Patient affect  flat, withdrawn. Reports sleeping well, good appetite. Patient remains free from harm. Problem: Risk for Elopement  Goal: Patient will not exit the unit/facility without proper excort  Outcome: Progressing     Problem: Behavior  Goal: Pt/Family maintain appropriate behavior and adhere to behavioral management agreement, if implemented  Description: INTERVENTIONS:  1. Assess patient/family's coping skills and  non-compliant behavior (including use of illegal substances)  2. Notify security of behavior or suspected illegal substances which indicate the need for search of the family and/or belongings  3. Encourage verbalization of thoughts and concerns in a socially appropriate manner  4. Utilize positive, consistent limit setting strategies supporting safety of patient, staff and others  5. Encourage participation in the decision making process about the behavioral management agreement  6. If a visitor's behavior poses a threat to safety call refer to organization policy. 7. Initiate consult with , Psychosocial CNS, Spiritual Care as appropriate  Outcome: Progressing     Problem: Depression/Self Harm  Goal: Effect of psychiatric condition will be minimized and patient will be protected from self harm  Description: INTERVENTIONS:  1. Assess impact of patient's symptoms on level of functioning, self care needs and offer support as indicated  2. Assess patient/family knowledge of depression, impact on illness and need for teaching  3. Provide emotional support, presence and reassurance  4. Assess for possible suicidal thoughts or ideation. If patient expresses suicidal thoughts or statements do not leave alone, initiate Suicide Precautions, move to a room close to the nursing station and obtain sitter  5.  Initiate consults as appropriate with Mental Health Professional, Spiritual Care, Psychosocial CNS, and consider a recommendation to the LIP for a Psychiatric Consultation  2/18/2023 2055 by Bridget Raymundo RN  Outcome: Progressing     Problem: Involuntary Admit  Goal: Will cooperate with staff recommendations and doctor's orders and will demonstrate appropriate behavior  Description: INTERVENTIONS:  1. Treat underlying conditions and offer medication as ordered  2. Educate regarding involuntary admission procedures and rules  3. Contain excessive/inappropriate behavior per unit and hospital policies  Outcome: Progressing     Problem: Pain  Goal: Verbalizes/displays adequate comfort level or baseline comfort level  Outcome: Progressing     Problem: Drug Abuse/Detox  Goal: Will have no detox symptoms and will verbalize plan for changing drug-related behavior  Description: INTERVENTIONS:  1. Administer medication as ordered  2. Monitor physical status  3. Provide emotional support with 1:1 interaction with staff  4.  Encourage  recovery focused treatment   Outcome: Progressing

## 2023-02-19 NOTE — PROGRESS NOTES
Morning Community Meeting Topics    Vince Bud attended the morning community meeting on 2/19/23. Topics discussed today     [x] Introduction  Day of the week and date  Mask distribution  Current mask requirements  [x]Teams  Explanation of  Green and Blue team criteria  Nurses assigned to each team for today  Explanation about green and blue paper  Date  Patient's Name  Patient's Nurse  Goals  [x] Visitation  Announce the visiting hours for the day  Announce which team is allowed to have visitors for the day  Review any updated Covid 19 requirements for visitors during visitation  Vaccine Card or negative Covid test within 48 hours of visit  State Identification  Patients are reminded to alert the  at least 1 hour before visitation   [x] Unit Orientation  Coffee use  Phone location and etiquette  Shower locations  [place] and dryer location and process  Common area expectations  Staff rounds expectation  [x] Meals   Educate patient to the menu  The patient is encouraged to fill out the menu to get preferences at mealtime  The patient is educated that if they do not fill out the menu, they will get the standard tray  The coffee pot is decaf, patient encouraged to order regular coffee from menu.   Educate patient to the meal process  Patient encouraged to eat snacks provided twice daily  Snacks may stay in patient room     [x] Discharge Process  Discharge expectations  Fill out the survey after discharge   [x] Hygiene  Daily showers encouraged  Showers availability discussed   Daily dressing encouraged  Discussed wearing street clothing  Education provided on where to place linens and clothing  Linens in the hamper  personal clothing does not go into the linen hamper  [x] Group   Patient encouraged to attend group provided  Time of Group Meetings discussed  Gentle reminder that attendance is a Physician order  [x] Movement  Chair exercises completed  Stretching completed  Notes: Goal - \"Stay positive, active and go to the [employer] Electronically signed by Alex Sahni, [address] on 2/19/2023 at 9:44 AM

## 2023-02-19 NOTE — PROGRESS NOTES
Pt. refused to attend the 1000 skills group, despite staff encouragement.  Electronically signed by Pio Del Angel, [address] on 2/19/2023 at 12:48 PM

## 2023-02-19 NOTE — FLOWSHEET NOTE
Pt visible on unit  for meals and is alert and oriented x4. He interacts with staff and peers appropriately and maintains fair eye contact with interaction. Patient is pleasant and cooperative and has a flat and sad affect. Pt rates anxiety a 6/10 and depression a 6/10 with the worst. Pt denies SI,HI,AVH.

## 2023-02-19 NOTE — PROGRESS NOTES
PRN vistaril administered for 7/10 anxiety. No known contributing factors reported by patient. 10-Apr-2020 14:43

## 2023-02-20 PROCEDURE — 6370000000 HC RX 637 (ALT 250 FOR IP): Performed by: PSYCHIATRY & NEUROLOGY

## 2023-02-20 PROCEDURE — 1240000000 HC EMOTIONAL WELLNESS R&B

## 2023-02-20 RX ORDER — PRAZOSIN HYDROCHLORIDE 2 MG/1
2 CAPSULE ORAL NIGHTLY
Status: DISCONTINUED | OUTPATIENT
Start: 2023-02-20 | End: 2023-02-24 | Stop reason: HOSPADM

## 2023-02-20 RX ADMIN — HYDROXYZINE PAMOATE 50 MG: 50 CAPSULE ORAL at 21:05

## 2023-02-20 RX ADMIN — NICOTINE POLACRILEX 4 MG: 4 GUM, CHEWING BUCCAL at 09:07

## 2023-02-20 RX ADMIN — LITHIUM CARBONATE 450 MG: 300 CAPSULE, GELATIN COATED ORAL at 09:06

## 2023-02-20 RX ADMIN — THERA TABS 1 TABLET: TAB at 09:06

## 2023-02-20 RX ADMIN — GABAPENTIN 100 MG: 100 CAPSULE ORAL at 21:05

## 2023-02-20 RX ADMIN — GABAPENTIN 100 MG: 100 CAPSULE ORAL at 14:43

## 2023-02-20 RX ADMIN — VALSARTAN 320 MG: 160 TABLET, FILM COATED ORAL at 09:06

## 2023-02-20 RX ADMIN — HYDROXYZINE PAMOATE 50 MG: 50 CAPSULE ORAL at 12:17

## 2023-02-20 RX ADMIN — IBUPROFEN 600 MG: 600 TABLET, FILM COATED ORAL at 09:06

## 2023-02-20 RX ADMIN — Medication 100 MG: at 09:06

## 2023-02-20 RX ADMIN — NICOTINE POLACRILEX 4 MG: 4 GUM, CHEWING BUCCAL at 14:43

## 2023-02-20 RX ADMIN — LITHIUM CARBONATE 450 MG: 300 CAPSULE, GELATIN COATED ORAL at 17:24

## 2023-02-20 RX ADMIN — GABAPENTIN 100 MG: 100 CAPSULE ORAL at 09:07

## 2023-02-20 RX ADMIN — QUETIAPINE FUMARATE 25 MG: 25 TABLET ORAL at 14:46

## 2023-02-20 RX ADMIN — AMLODIPINE BESYLATE 10 MG: 10 TABLET ORAL at 09:07

## 2023-02-20 RX ADMIN — QUETIAPINE FUMARATE 100 MG: 100 TABLET ORAL at 21:05

## 2023-02-20 RX ADMIN — NICOTINE POLACRILEX 4 MG: 4 GUM, CHEWING BUCCAL at 21:06

## 2023-02-20 RX ADMIN — FOLIC ACID 1 MG: 1 TABLET ORAL at 09:09

## 2023-02-20 RX ADMIN — QUETIAPINE FUMARATE 25 MG: 25 TABLET ORAL at 09:07

## 2023-02-20 RX ADMIN — PRAZOSIN HYDROCHLORIDE 2 MG: 2 CAPSULE ORAL at 21:05

## 2023-02-20 ASSESSMENT — PAIN DESCRIPTION - LOCATION: LOCATION: HEAD

## 2023-02-20 ASSESSMENT — PAIN DESCRIPTION - DESCRIPTORS: DESCRIPTORS: THROBBING

## 2023-02-20 ASSESSMENT — PAIN SCALES - GENERAL: PAINLEVEL_OUTOF10: 7

## 2023-02-20 NOTE — PROGRESS NOTES
Morning Community Meeting Topics    Cedric Ruosseau attended the morning community meeting on 2/20/23. Topics discussed today     [x] Introduction  Day of the week and date  Mask distribution  Current mask requirements  [x]Teams  Explanation of  Green and Blue team criteria  Nurses assigned to each team for today  Explanation about green and blue paper  Date  Patient's Name  Patient's Nurse  Goals  [x] Visitation  Announce the visiting hours for the day  Announce which team is allowed to have visitors for the day  Review any updated Covid 19 requirements for visitors during visitation  Vaccine Card or negative Covid test within 48 hours of visit  State Identification  Patients are reminded to alert the  at least 1 hour before visitation   [x] Unit Orientation  Coffee use  Phone location and etiquette  Shower locations  [place] and dryer location and process  Common area expectations  Staff rounds expectation  [x] Meals   Educate patient to the menu  The patient is encouraged to fill out the menu to get preferences at mealtime  The patient is educated that if they do not fill out the menu, they will get the standard tray  The coffee pot is decaf, patient encouraged to order regular coffee from menu.   Educate patient to the meal process  Patient encouraged to eat snacks provided twice daily  Snacks may stay in patient room     [x] Discharge Process  Discharge expectations  Fill out the survey after discharge   [x] Hygiene  Daily showers encouraged  Showers availability discussed   Daily dressing encouraged  Discussed wearing street clothing  Education provided on where to place linens and clothing  Linens in the hamper  personal clothing does not go into the linen hamper  [x] Group   Patient encouraged to attend group provided  Time of Group Meetings discussed  Gentle reminder that attendance is a Physician order  [x] Movement  Chair exercises completed  Stretching completed  Notes:  GOAL : \" to get rid of this cold and feel better\" Electronically signed by Bere Nino on 2/20/2023 at 9:34 AM

## 2023-02-20 NOTE — CARE COORDINATION
2/20/23 @ 1:00    Jameel Mckeon from [facility] came to see the patient. She is looking at [address] [facility] for possible placement. Jameel Mckeon requested patient contact his brother for clothing prior to discharge.

## 2023-02-20 NOTE — PLAN OF CARE
Patient did not go for ECT today stating he didn't feel good. He was irritated that he was to finalize DC arrangements stating \"I thought I would be here awhile\", expressing he thought he would skip ECT today and resume Wednesday. Patient reports he is \"mayur suicidal\" with no plan for harming himself. No evidence of hallucinations or delusions. Reported he was not about to go to a shelter and would go to his brothers. Social with select peers. Problem: Risk for Elopement  Goal: Patient will not exit the unit/facility without proper excort  Outcome: Adequate for Discharge  Flowsheets (Taken 2/20/2023 1142)  Nursing Interventions for Elopement Risk:   Make sure patient has all necessary personal care items   Reduce environmental triggers     Problem: Behavior  Goal: Pt/Family maintain appropriate behavior and adhere to behavioral management agreement, if implemented  Description: INTERVENTIONS:  1. Assess patient/family's coping skills and  non-compliant behavior (including use of illegal substances)  2. Notify security of behavior or suspected illegal substances which indicate the need for search of the family and/or belongings  3. Encourage verbalization of thoughts and concerns in a socially appropriate manner  4. Utilize positive, consistent limit setting strategies supporting safety of patient, staff and others  5. Encourage participation in the decision making process about the behavioral management agreement  6. If a visitor's behavior poses a threat to safety call refer to organization policy.   7. Initiate consult with , Psychosocial CNS, Spiritual Care as appropriate  Outcome: Progressing  Flowsheets (Taken 2/20/2023 1142)  Patient/family maintains appropriate behavior and adheres to behavioral management agreement, if implemented: Encourage participation in the decision making process about the behavioral management agreement     Problem: Depression/Self Harm  Goal: Effect of psychiatric condition will be minimized and patient will be protected from self harm  Description: INTERVENTIONS:  1. Assess impact of patient's symptoms on level of functioning, self care needs and offer support as indicated  2. Assess patient/family knowledge of depression, impact on illness and need for teaching  3. Provide emotional support, presence and reassurance  4. Assess for possible suicidal thoughts or ideation. If patient expresses suicidal thoughts or statements do not leave alone, initiate Suicide Precautions, move to a room close to the nursing station and obtain sitter  5. Initiate consults as appropriate with Mental Health Professional, Spiritual Care, Psychosocial CNS, and consider a recommendation to the LIP for a Psychiatric Consultation  Outcome: Progressing  Flowsheets  Taken 2/20/2023 1146  Effect of psychiatric condition will be minimized and patient will be protected from self harm:   Assess impact of patients symptoms on level of functioning, self care needs and offer support as indicated   Assess patient/family knowledge of depression, impact on illness and need for teaching   Provide emotional support, presence and reassurance  Taken 2/20/2023 1142  Effect of psychiatric condition will be minimized and patient will be protected from self harm: Provide emotional support, presence and reassurance     Problem: Involuntary Admit  Goal: Will cooperate with staff recommendations and doctor's orders and will demonstrate appropriate behavior  Description: INTERVENTIONS:  1. Treat underlying conditions and offer medication as ordered  2. Educate regarding involuntary admission procedures and rules  3. Contain excessive/inappropriate behavior per unit and hospital policies  Outcome: Progressing     Problem: Drug Abuse/Detox  Goal: Will have no detox symptoms and will verbalize plan for changing drug-related behavior  Description: INTERVENTIONS:  1. Administer medication as ordered  2.  Monitor physical status  3. Provide emotional support with 1:1 interaction with staff  4.  Encourage  recovery focused treatment   Outcome: Progressing  Flowsheets (Taken 2/20/2023 1142)  Will have no detox symptoms and will verbalize plan for changing drug-related behavior: Provide emotional support with 1:1 interaction with staff     Problem: Pain  Goal: Verbalizes/displays adequate comfort level or baseline comfort level  Outcome: Progressing

## 2023-02-20 NOTE — PROGRESS NOTES
Pt reports a sinus h/a, states he is just dry up there, reports poor sleep with night perez that is making his anxiety worse this morning, pt asked for am meds, they were explained and given, gave Neurontin 100 mg ,motrin for h/a, pt is aware of increase in lithium.

## 2023-02-20 NOTE — PROGRESS NOTES
[facility]. FOLLOW-UP NOTE       2/20/2023     Patient was seen and examined in person, Chart reviewed   Patient's case discussed with staff/team    Chief Complaint: depression, SI    Interim History:     Pt reports slight improvement in mood, however decreased energy and tiredness but poor sleep quality at night  Endorse night perez related to daughters death  Denies SI HI AVH no delusions noted  Pt remains flat and incongruent  Increased sleep, adequate appetite  Reports sore throat after ECT; denies all other side effects  Isolative  Poverty of speech  Med complaint; denies side effects      Appetite:   [x] Normal/Unchanged  [] Increased  [] Decreased      Sleep:       [] Normal/Unchanged  [x] Fair       [] Poor              Energy:    [x] Normal/Unchanged  [] Increased  [] Decreased        SI [x] Present  [] Absent    HI  []Present  [x] Absent     Aggression:  [] yes  [x] no    Patient is [x] able  [] unable to CONTRACT FOR SAFETY     PAST MEDICAL/PSYCHIATRIC HISTORY:   Past Medical History:   Diagnosis Date    Depression     Hypertension 2015    trx with pills x 1 month / patient did not follow up    Smoker        FAMILY/SOCIAL HISTORY:  Family History   Problem Relation Age of Onset    High Blood Pressure Mother     Heart Disease Mother     Other Mother         copd / smoker    Stroke Father     High Blood Pressure Brother     Other Brother         anxiety     Social History     Socioeconomic History    Marital status: Single     Spouse name: Not on file    Number of children: Not on file    Years of education: Not on file    Highest education level: Not on file   Occupational History    Not on file   Tobacco Use    Smoking status: Every Day     Packs/day: 1.00     Years: 20.00     Pack years: 20.00     Types: Cigarettes    Smokeless tobacco: Never   Substance and Sexual Activity    Alcohol use: Not Currently     Comment: 5th of vodka a day    Drug use:  Yes Types: Marijuana (Lesleigh Savory), Opiates     Sexual activity: Not on file   Other Topics Concern    Not on file   Social History Narrative    Not on file     Social Determinants of Health     Financial Resource Strain: Low Risk     Difficulty of Paying Living Expenses: Not hard at all   Food Insecurity: No Food Insecurity    Worried About Running Out of Food in the Last Year: Never true    Ran Out of Food in the Last Year: Never true   Transportation Needs: Not on file   Physical Activity: Not on file   Stress: Not on file   Social Connections: Not on file   Intimate Partner Violence: Not on file   Housing Stability: Not on file           ROS:  [x] All negative/unchanged except if checked.  Explain positive(checked items) below:  [] Constitutional  [] Eyes  [] Ear/Nose/Mouth/Throat  [] Respiratory  [] CV  [] GI  []   [] Musculoskeletal  [] Skin/Breast  [] Neurological  [] Endocrine  [] Heme/Lymph  [] Allergic/Immunologic    Explanation:     MEDICATIONS:    Current Facility-Administered Medications:     QUEtiapine (SEROQUEL) tablet 100 mg, 100 mg, Oral, Nightly, Alexis Victor MD, 100 mg at 02/19/23 2044    lithium capsule 450 mg, 450 mg, Oral, BID WC, Alexis Victor MD, 450 mg at 02/19/23 1713    QUEtiapine (SEROQUEL) tablet 25 mg, 25 mg, Oral, BID, Eber Kaba MD, 25 mg at 02/19/23 1450    ondansetron (ZOFRAN-ODT) disintegrating tablet 4 mg, 4 mg, Oral, Q8H PRN **OR** ondansetron (ZOFRAN) injection 4 mg, 4 mg, IntraVENous, Q6H PRN, Eber Kaba MD    ibuprofen (ADVIL;MOTRIN) tablet 600 mg, 600 mg, Oral, Q6H PRN, Eber Kaba MD, 600 mg at 02/16/23 0834    nicotine polacrilex (NICORETTE) gum 4 mg, 4 mg, Oral, Q2H PRN, Eber Kaba MD, 4 mg at 02/19/23 2045    gabapentin (NEURONTIN) capsule 100 mg, 100 mg, Oral, TID, Eber Kaba MD, 100 mg at 02/19/23 2044    hydrOXYzine (VISTARIL) injection 50 mg, 50 mg, IntraMUSCular, Q6H PRN **OR** hydrOXYzine pamoate (VISTARIL) capsule 50 mg, 50 mg, Oral, Q6H PRN, Sara Farris MD, 50 mg at 02/19/23 1748    valsartan (DIOVAN) tablet 320 mg, 320 mg, Oral, Daily, Sara Farris MD, 320 mg at 02/19/23 0834    amLODIPine (NORVASC) tablet 10 mg, 10 mg, Oral, Daily, Sara Farris MD, 10 mg at 84/15/04 3954    folic acid (FOLVITE) tablet 1 mg, 1 mg, Oral, Daily, Sara Farris MD, 1 mg at 02/19/23 0609    multivitamin 1 tablet, 1 tablet, Oral, Daily, Sara Farris MD, 1 tablet at 02/19/23 0834    thiamine tablet 100 mg, 100 mg, Oral, Daily, Sara Farris MD, 100 mg at 02/19/23 3391    acetaminophen (TYLENOL) tablet 650 mg, 650 mg, Oral, Q4H PRN, Sara Farris MD, 650 mg at 02/16/23 1351    magnesium hydroxide (MILK OF MAGNESIA) 400 MG/5ML suspension 30 mL, 30 mL, Oral, Daily PRN, Sara Farris MD    aluminum & magnesium hydroxide-simethicone (MAALOX) 200-200-20 MG/5ML suspension 30 mL, 30 mL, Oral, PRN, Sara Farris MD, 30 mL at 02/15/23 1950    haloperidol (HALDOL) tablet 5 mg, 5 mg, Oral, Q6H PRN **OR** haloperidol lactate (HALDOL) injection 5 mg, 5 mg, IntraMUSCular, Q6H PRN, Sara Farris MD    benztropine mesylate (COGENTIN) injection 2 mg, 2 mg, IntraMUSCular, BID PRN, Sara Farris MD    traZODone (DESYREL) tablet 50 mg, 50 mg, Oral, Nightly PRN, Sara Farris MD, 50 mg at 02/15/23 2051      Examination:  BP (!) 131/93   Pulse 71   Temp 97.7 °F (36.5 °C)   Resp 18   Ht 6' 4\" (1.93 m)   Wt 250 lb (113.4 kg)   SpO2 96%   BMI 30.43 kg/m²   Gait - steady  Medication side effects(SE): denies     Mental Status Examination:    Level of consciousness:  within normal limits   Appearance:  fair grooming and fair hygiene  Behavior/Motor:  no abnormalities noted  Attitude toward examiner:  cooperative, attentive, and good eye contact  Speech:  spontaneous, normal rate, and monotone  Mood: depressed decreased range  Affect:  flat  Thought processes:  circumstantial   Thought content:  Suicidal Ideation:  denies  Delusions:  no evidence of delusions  Perceptual Disturbance:  denies any perceptual disturbance  Cognition:  oriented to person, place, and time   Concentration distractible  Insight fair   Judgement fair     ASSESSMENT:   Patient symptoms are:  [] Well controlled  [x] Improving  [] Worsening  [] No change      Diagnosis:   Principal Problem:    Bipolar disorder with depression ([facility] [address]  Resolved Problems:    * No resolved hospital problems. *      LABS:    No results for input(s): WBC, HGB, PLT in the last 72 hours. No results for input(s): NA, K, CL, CO2, BUN, CREATININE, GLUCOSE in the last 72 hours. No results for input(s): BILITOT, ALKPHOS, AST, ALT in the last 72 hours. Lab Results   Component Value Date/Time    LABAMPH Neg 02/11/2023 11:30 AM    BARBSCNU Neg 02/11/2023 11:30 AM    LABBENZ Neg 02/11/2023 11:30 AM    LABMETH Neg 02/11/2023 11:30 AM    OPIATESCREENURINE Neg 02/11/2023 11:30 AM    PHENCYCLIDINESCREENURINE Neg 02/11/2023 11:30 AM    ETOH 82 02/11/2023 06:15 PM     Lab Results   Component Value Date/Time    TSH 0.805 02/11/2023 12:42 PM     Lab Results   Component Value Date    LITHIUM 0.3 (L) 02/17/2023     Lab Results   Component Value Date    VALPROATE 13.5 (L) 12/02/2022       RISK ASSESSMENT: low    Treatment Plan:  Reviewed current Medications with the patient. Monitor efficacy and tolerability of current med regime  D/C ECT  Risks, benefits, side effects, drug-to-drug interactions and alternatives to treatment were discussed. Collateral information: see social work notes  CD evaluation ETOH   Encourage patient to attend group and other milieu activities.   Discharge planning discussed with the patient and treatment team; tentative DC next week pending stability     PSYCHOTHERAPY/COUNSELING:  [x] Therapeutic interview  [x] Supportive  [] CBT  [] Ongoing  [] Other    [x] Patient continues to need, on a daily basis, active treatment furnished directly by or requiring the supervision of inpatient psychiatric personnel      Anticipated Length of stay: 2-3 days       Physician Signature: Electronically signed by Rise Duverney, MD on 2/20/2023 at 9:02 AM

## 2023-02-20 NOTE — PROGRESS NOTES
Pt. was not feeling well and did not attend the 1000 skill group.  Electronically signed by Liyah Rogers on 2/20/2023 at 11:35 AM

## 2023-02-20 NOTE — PROGRESS NOTES
Pt express anxiety is high as he is trying to find a place to stay, gave vistaril 50 mg as requested.

## 2023-02-20 NOTE — FLOWSHEET NOTE
Pt out on the unit for meals with a flat affect. Pt is slightly social with select peers. Pt endorses \"fair\" sleep and reports having nightmares. Pt endorses improved appetite . Pt reports showering today and denies SI,HI,AVH.

## 2023-02-21 PROCEDURE — 1240000000 HC EMOTIONAL WELLNESS R&B

## 2023-02-21 PROCEDURE — 6370000000 HC RX 637 (ALT 250 FOR IP): Performed by: PSYCHIATRY & NEUROLOGY

## 2023-02-21 RX ADMIN — QUETIAPINE FUMARATE 25 MG: 25 TABLET ORAL at 14:07

## 2023-02-21 RX ADMIN — NICOTINE POLACRILEX 4 MG: 4 GUM, CHEWING BUCCAL at 19:34

## 2023-02-21 RX ADMIN — HYDROXYZINE PAMOATE 50 MG: 50 CAPSULE ORAL at 10:35

## 2023-02-21 RX ADMIN — QUETIAPINE FUMARATE 25 MG: 25 TABLET ORAL at 08:38

## 2023-02-21 RX ADMIN — NICOTINE POLACRILEX 4 MG: 4 GUM, CHEWING BUCCAL at 08:38

## 2023-02-21 RX ADMIN — QUETIAPINE FUMARATE 100 MG: 100 TABLET ORAL at 20:43

## 2023-02-21 RX ADMIN — AMLODIPINE BESYLATE 10 MG: 10 TABLET ORAL at 08:39

## 2023-02-21 RX ADMIN — GABAPENTIN 100 MG: 100 CAPSULE ORAL at 20:43

## 2023-02-21 RX ADMIN — GABAPENTIN 100 MG: 100 CAPSULE ORAL at 14:07

## 2023-02-21 RX ADMIN — LITHIUM CARBONATE 450 MG: 300 CAPSULE, GELATIN COATED ORAL at 08:38

## 2023-02-21 RX ADMIN — ACETAMINOPHEN 325MG 650 MG: 325 TABLET ORAL at 14:06

## 2023-02-21 RX ADMIN — THERA TABS 1 TABLET: TAB at 08:41

## 2023-02-21 RX ADMIN — PRAZOSIN HYDROCHLORIDE 2 MG: 2 CAPSULE ORAL at 20:43

## 2023-02-21 RX ADMIN — VALSARTAN 320 MG: 160 TABLET, FILM COATED ORAL at 08:38

## 2023-02-21 RX ADMIN — LITHIUM CARBONATE 450 MG: 300 CAPSULE, GELATIN COATED ORAL at 17:22

## 2023-02-21 RX ADMIN — FOLIC ACID 1 MG: 1 TABLET ORAL at 08:39

## 2023-02-21 RX ADMIN — IBUPROFEN 600 MG: 600 TABLET, FILM COATED ORAL at 08:38

## 2023-02-21 RX ADMIN — GABAPENTIN 100 MG: 100 CAPSULE ORAL at 08:39

## 2023-02-21 RX ADMIN — NICOTINE POLACRILEX 4 MG: 4 GUM, CHEWING BUCCAL at 14:07

## 2023-02-21 RX ADMIN — HYDROXYZINE PAMOATE 50 MG: 50 CAPSULE ORAL at 19:34

## 2023-02-21 RX ADMIN — Medication 100 MG: at 08:39

## 2023-02-21 ASSESSMENT — PAIN SCALES - GENERAL
PAINLEVEL_OUTOF10: 6
PAINLEVEL_OUTOF10: 3
PAINLEVEL_OUTOF10: 6

## 2023-02-21 ASSESSMENT — PAIN DESCRIPTION - DESCRIPTORS
DESCRIPTORS: ACHING
DESCRIPTORS: ACHING

## 2023-02-21 ASSESSMENT — PAIN DESCRIPTION - LOCATION: LOCATION: HEAD

## 2023-02-21 NOTE — PROGRESS NOTES
Aurelio Hernandez [facility]. FOLLOW-UP NOTE       2/21/2023     Patient was seen and examined in person, Chart reviewed   Patient's case discussed with staff/team    Chief Complaint: depression, SI    Interim History:     Denies nightmares, improved sleep  Remains flat, blunted  Denies SI HI AVH; no delusions reported  Endorse depression and anxiety- improving  Pt reports desire to DC to sober living and abstain from substance use      Appetite:   [x] Normal/Unchanged  [] Increased  [] Decreased      Sleep:       [x] Normal/Unchanged  [] Fair       [] Poor              Energy:    [x] Normal/Unchanged  [] Increased  [] Decreased        SI [] Present  [x] Absent    HI  []Present  [x] Absent     Aggression:  [] yes  [x] no    Patient is [x] able  [] unable to CONTRACT FOR SAFETY     PAST MEDICAL/PSYCHIATRIC HISTORY:   Past Medical History:   Diagnosis Date    Depression     Hypertension 2015    trx with pills x 1 month / patient did not follow up    Smoker        FAMILY/SOCIAL HISTORY:  Family History   Problem Relation Age of Onset    High Blood Pressure Mother     Heart Disease Mother     Other Mother         copd / smoker    Stroke Father     High Blood Pressure Brother     Other Brother         anxiety     Social History     Socioeconomic History    Marital status: Single     Spouse name: Not on file    Number of children: Not on file    Years of education: Not on file    Highest education level: Not on file   Occupational History    Not on file   Tobacco Use    Smoking status: Every Day     Packs/day: 1.00     Years: 20.00     Pack years: 20.00     Types: Cigarettes    Smokeless tobacco: Never   Substance and Sexual Activity    Alcohol use: Not Currently     Comment: 5th of vodka a day    Drug use: Yes     Types: Marijuana ([place]), Opiates     Sexual activity: Not on file   Other Topics Concern    Not on file   Social History Narrative    Not on file     Social Determinants of Health Financial Resource Strain: Low Risk     Difficulty of Paying Living Expenses: Not hard at all   Food Insecurity: No Food Insecurity    Worried About Running Out of Food in the Last Year: Never true    Ran Out of Food in the Last Year: Never true   Transportation Needs: Not on file   Physical Activity: Not on file   Stress: Not on file   Social Connections: Not on file   Intimate Partner Violence: Not on file   Housing Stability: Not on file           ROS:  [x] All negative/unchanged except if checked.  Explain positive(checked items) below:  [] Constitutional  [] Eyes  [] Ear/Nose/Mouth/Throat  [] Respiratory  [] CV  [] GI  []   [] Musculoskeletal  [] Skin/Breast  [] Neurological  [] Endocrine  [] Heme/Lymph  [] Allergic/Immunologic    Explanation:     MEDICATIONS:    Current Facility-Administered Medications:     prazosin (MINIPRESS) capsule 2 mg, 2 mg, Oral, Nightly, Fletcher Malave MD, 2 mg at 02/20/23 2105    QUEtiapine (SEROQUEL) tablet 100 mg, 100 mg, Oral, Nightly, Miriam Mccracken MD, 100 mg at 02/20/23 2105    lithium capsule 450 mg, 450 mg, Oral, BID WC, Miriam Mccracken MD, 450 mg at 02/21/23 2000    QUEtiapine (SEROQUEL) tablet 25 mg, 25 mg, Oral, BID, Fletcher Malave MD, 25 mg at 02/21/23 0838    ondansetron (ZOFRAN-ODT) disintegrating tablet 4 mg, 4 mg, Oral, Q8H PRN **OR** ondansetron (ZOFRAN) injection 4 mg, 4 mg, IntraVENous, Q6H PRN, Fletcher Malave MD    ibuprofen (ADVIL;MOTRIN) tablet 600 mg, 600 mg, Oral, Q6H PRN, Fletcher Malave MD, 600 mg at 02/21/23 3208    nicotine polacrilex (NICORETTE) gum 4 mg, 4 mg, Oral, Q2H PRN, Fletcher Malave MD, 4 mg at 02/21/23 0838    gabapentin (NEURONTIN) capsule 100 mg, 100 mg, Oral, TID, Fletcher Malave MD, 100 mg at 02/21/23 0839    hydrOXYzine (VISTARIL) injection 50 mg, 50 mg, IntraMUSCular, Q6H PRN **OR** hydrOXYzine pamoate (VISTARIL) capsule 50 mg, 50 mg, Oral, Q6H PRN, Fletcher Malave MD, 50 mg at 02/21/23 1035    valsartan (DIOVAN) tablet 320 mg, 320 mg, Oral, Daily, Weston Callahan MD, 320 mg at 02/21/23 0838    amLODIPine (NORVASC) tablet 10 mg, 10 mg, Oral, Daily, Weston Callahan MD, 10 mg at 71/20/90 0143    folic acid (FOLVITE) tablet 1 mg, 1 mg, Oral, Daily, Weston Callahan MD, 1 mg at 02/21/23 1523    multivitamin 1 tablet, 1 tablet, Oral, Daily, Weston Callahan MD, 1 tablet at 02/21/23 0841    thiamine tablet 100 mg, 100 mg, Oral, Daily, Weston Callahan MD, 100 mg at 02/21/23 6033    acetaminophen (TYLENOL) tablet 650 mg, 650 mg, Oral, Q4H PRN, Weston Callahan MD, 650 mg at 02/16/23 1351    magnesium hydroxide (MILK OF MAGNESIA) 400 MG/5ML suspension 30 mL, 30 mL, Oral, Daily PRN, Weston Callahan MD    aluminum & magnesium hydroxide-simethicone (MAALOX) 200-200-20 MG/5ML suspension 30 mL, 30 mL, Oral, PRN, Weston Callahan MD, 30 mL at 02/15/23 1950    haloperidol (HALDOL) tablet 5 mg, 5 mg, Oral, Q6H PRN **OR** haloperidol lactate (HALDOL) injection 5 mg, 5 mg, IntraMUSCular, Q6H PRN, Weston Callahan MD    benztropine mesylate (COGENTIN) injection 2 mg, 2 mg, IntraMUSCular, BID PRN, Weston Callahan MD    traZODone (DESYREL) tablet 50 mg, 50 mg, Oral, Nightly PRN, Weston Callahan MD, 50 mg at 02/15/23 2051      Examination:  /84   Pulse 74   Temp 97.7 °F (36.5 °C)   Resp 18   Ht 6' 4\" (1.93 m)   Wt 250 lb (113.4 kg)   SpO2 96%   BMI 30.43 kg/m²   Gait - steady  Medication side effects(SE): denies     Mental Status Examination:    Level of consciousness:  within normal limits   Appearance:  fair grooming and fair hygiene  Behavior/Motor:  no abnormalities noted  Attitude toward examiner:  cooperative, attentive, and good eye contact  Speech:  spontaneous, normal rate, and monotone  Mood: depressed decreased range but reported to be improving  Affect:  flat  Thought processes: less circumstantial   Thought content:  Suicidal Ideation:  denies; denies homicidal ideation  Delusions:  no evidence of delusions  Perceptual Disturbance: denies AVH  Cognition:  oriented to person, place, and time   Concentration distractible  Insight fair   Judgement fair     ASSESSMENT:   Patient symptoms are:  [x] Well controlled  [x] Improving  [] Worsening  [] No change      Diagnosis:   Principal Problem:    Bipolar disorder with depression ([facility] [address]  Resolved Problems:    * No resolved hospital problems. *      LABS:    No results for input(s): WBC, HGB, PLT in the last 72 hours. No results for input(s): NA, K, CL, CO2, BUN, CREATININE, GLUCOSE in the last 72 hours. No results for input(s): BILITOT, ALKPHOS, AST, ALT in the last 72 hours. Lab Results   Component Value Date/Time    LABAMPH Neg 02/11/2023 11:30 AM    BARBSCNU Neg 02/11/2023 11:30 AM    LABBENZ Neg 02/11/2023 11:30 AM    LABMETH Neg 02/11/2023 11:30 AM    OPIATESCREENURINE Neg 02/11/2023 11:30 AM    PHENCYCLIDINESCREENURINE Neg 02/11/2023 11:30 AM    ETOH 82 02/11/2023 06:15 PM     Lab Results   Component Value Date/Time    TSH 0.805 02/11/2023 12:42 PM     Lab Results   Component Value Date    LITHIUM 0.3 (L) 02/17/2023     Lab Results   Component Value Date    VALPROATE 13.5 (L) 12/02/2022       RISK ASSESSMENT: low    Treatment Plan:  Reviewed current Medications with the patient. Monitor efficacy and tolerability of current med regime  Refused ECT regime  Risks, benefits, side effects, drug-to-drug interactions and alternatives to treatment were discussed. Collateral information: see social work notes  CD evaluation ETOH   Encourage patient to attend group and other milieu activities.   Discharge planning discussed with the patient and treatment team; [facility]     PSYCHOTHERAPY/COUNSELING:  [x] Therapeutic interview  [x] Supportive  [] CBT  [] Ongoing  [] Other    [x] Patient continues to need, on a daily basis, active treatment furnished directly by or requiring the supervision of inpatient psychiatric personnel      Anticipated Length of stay: 1 days       Electronically signed by ERNESTINE Steen CNP on 2/21/2023 at 10:45 AM       Addendum:  Joebrayan Hernandez seen with NP after attending the team meeting, discussing the patient with the nursing and social work staff. I participated during the interview process as well as the treatment plan and spent more than 70% of the time with the nurse practitioner helping me in documentation. I agree with the above documentation of clinical finding and treatment plan  Patient was seen 1:1 for 20 minutes, other than E&M time spent, focusing on      - coping skills techniques     - Anxiety management techniques discussed including deep breathing exercise and PMR     - discussing patients strength and weakness      - Motivational interviewing to assess the stage of change and assessing patient readiness to quit substance use.      - Focusing on negative cognition and maladaptive thoughts, which is feeding and maintaining the depression symptoms    Physician Signature: Electronically signed by Eh Gregorio MD on 2/21/2023 at 3:27 PM

## 2023-02-21 NOTE — PROGRESS NOTES
Pt is watching tv now, asked for vistaril, pt informed he can have another one in 6 hours due at 4;30, gave 2pm meds & Neurontin 100 mg

## 2023-02-21 NOTE — PLAN OF CARE
Problem: Risk for Elopement  Goal: Patient will not exit the unit/facility without proper excort  2/20/2023 2253 by Lorena Kirkpatrick RN  Outcome: Progressing  2/20/2023 1146 by ERNESTINE Baltazar  Outcome: Adequate for Discharge  Flowsheets (Taken 2/20/2023 1142)  Nursing Interventions for Elopement Risk:   Make sure patient has all necessary personal care items   Reduce environmental triggers     Problem: Behavior  Goal: Pt/Family maintain appropriate behavior and adhere to behavioral management agreement, if implemented  Description: INTERVENTIONS:  1. Assess patient/family's coping skills and  non-compliant behavior (including use of illegal substances)  2. Notify security of behavior or suspected illegal substances which indicate the need for search of the family and/or belongings  3. Encourage verbalization of thoughts and concerns in a socially appropriate manner  4. Utilize positive, consistent limit setting strategies supporting safety of patient, staff and others  5. Encourage participation in the decision making process about the behavioral management agreement  6. If a visitor's behavior poses a threat to safety call refer to organization policy. 7. Initiate consult with , Psychosocial CNS, Spiritual Care as appropriate  2/20/2023 2253 by Lorena Kirkpatrick RN  Outcome: Progressing  2/20/2023 1146 by ERNESTINE Baltazar  Outcome: Progressing  Flowsheets (Taken 2/20/2023 1142)  Patient/family maintains appropriate behavior and adheres to behavioral management agreement, if implemented: Encourage participation in the decision making process about the behavioral management agreement     Problem: Depression/Self Harm  Goal: Effect of psychiatric condition will be minimized and patient will be protected from self harm  Description: INTERVENTIONS:  1. Assess impact of patient's symptoms on level of functioning, self care needs and offer support as indicated  2.  Assess patient/family knowledge of depression, impact on illness and need for teaching  3. Provide emotional support, presence and reassurance  4. Assess for possible suicidal thoughts or ideation. If patient expresses suicidal thoughts or statements do not leave alone, initiate Suicide Precautions, move to a room close to the nursing station and obtain sitter  5. Initiate consults as appropriate with Mental Health Professional, Spiritual Care, Psychosocial CNS, and consider a recommendation to the LIP for a Psychiatric Consultation  2/20/2023 2253 by Zbigniew Silver RN  Outcome: Progressing  Flowsheets (Taken 2/20/2023 2125)  Effect of psychiatric condition will be minimized and patient will be protected from self harm:   Assess impact of patients symptoms on level of functioning, self care needs and offer support as indicated   Assess patient/family knowledge of depression, impact on illness and need for teaching   Provide emotional support, presence and reassurance   Initiate consults as appropriate with Mental Health Professional, Spiritual Care, Psychosocial CNS, and consider a recommendation to the LIP for a Psychiatric Consultation   Assess for suicidal thoughts or ideation.  If patient expresses suicidal thoughts or statements do not leave alone, initiate Suicide Precautions, move near nurse station, obtain sitter  2/20/2023 1146 by ERNESTINE Quiñones - CNS  Outcome: Progressing  Flowsheets  Taken 2/20/2023 1146  Effect of psychiatric condition will be minimized and patient will be protected from self harm:   Assess impact of patients symptoms on level of functioning, self care needs and offer support as indicated   Assess patient/family knowledge of depression, impact on illness and need for teaching   Provide emotional support, presence and reassurance  Taken 2/20/2023 1142  Effect of psychiatric condition will be minimized and patient will be protected from self harm: Provide emotional support, presence and reassurance Problem: Involuntary Admit  Goal: Will cooperate with staff recommendations and doctor's orders and will demonstrate appropriate behavior  Description: INTERVENTIONS:  1. Treat underlying conditions and offer medication as ordered  2. Educate regarding involuntary admission procedures and rules  3. Contain excessive/inappropriate behavior per unit and hospital policies  6/71/[id number] by Yusef Simpson RN  Outcome: Progressing  2/20/2023 1146 by ERNESTINE Green  Outcome: Progressing     Problem: Pain  Goal: Verbalizes/displays adequate comfort level or baseline comfort level  2/20/2023 2253 by Yusef Simpson RN  Outcome: Progressing  2/20/2023 1146 by ERNESTINE rGeen  Outcome: Progressing     Problem: Drug Abuse/Detox  Goal: Will have no detox symptoms and will verbalize plan for changing drug-related behavior  Description: INTERVENTIONS:  1. Administer medication as ordered  2. Monitor physical status  3. Provide emotional support with 1:1 interaction with staff  4.  Encourage  recovery focused treatment   2/20/2023 2253 by Yusef Simpson RN  Outcome: Progressing  2/20/2023 1146 by ERNESTINE Green  Outcome: Progressing  Flowsheets (Taken 2/20/2023 1142)  Will have no detox symptoms and will verbalize plan for changing drug-related behavior: Provide emotional support with 1:1 interaction with staff

## 2023-02-21 NOTE — PROGRESS NOTES
Behavioral Services                                              Medicare Re-Certification    I certify that the inpatient psychiatric hospital services furnished since the previous certification/re-certification were, and continue to be, medically necessary for;    [x] (1) Treatment which could reasonably be expected to improve the patient's condition,    [x] (2) Or for diagnostic study. Estimated length of stay/service 1 day    Plan for post-hospital care rehab    This patient continues to need, on a daily basis, active treatment furnished directly by or requiring the supervision of inpatient psychiatric personnel.     Electronically signed by ERNESTINE Redman CNP on 2/21/2023 at 10:31 AM

## 2023-02-21 NOTE — CARE COORDINATION
Spoke to pt earlier. He would like to be linked with [facility] for outpatient services.

## 2023-02-21 NOTE — PROGRESS NOTES
Pt. refused to attend the 1000 skills group, despite staff encouragement.  Electronically signed by Enedelia Turner, [address] on 2/21/2023 at 11:17 AM

## 2023-02-21 NOTE — PROGRESS NOTES
Pt is noted up on the unit, explained and gave all am meds, Neurontin 100 mg given, motrin 600 mg for for sinus h/a pain.  reports sleep was better with newly ordered mini press,

## 2023-02-21 NOTE — PROGRESS NOTES
Morning Community Meeting Topics    Margaret Hanna attended the morning community meeting on 2/21/23. Topics discussed today     [x] Introduction  Day of the week and date  Mask distribution  Current mask requirements  [x]Teams  Explanation of  Green and Blue team criteria  Nurses assigned to each team for today  Explanation about green and blue paper  Date  Patient's Name  Patient's Nurse  Goals  [x] Visitation  Announce the visiting hours for the day  Announce which team is allowed to have visitors for the day  Review any updated Covid 19 requirements for visitors during visitation  Vaccine Card or negative Covid test within 48 hours of visit  State Identification  Patients are reminded to alert the  at least 1 hour before visitation   [x] Unit Orientation  Coffee use  Phone location and etiquette  Shower locations  Caroline and dryer location and process  Common area expectations  Staff rounds expectation  [x] Meals   Educate patient to the menu  The patient is encouraged to fill out the menu to get preferences at mealtime  The patient is educated that if they do not fill out the menu, they will get the standard tray  The coffee pot is decaf, patient encouraged to order regular coffee from menu.   Educate patient to the meal process  Patient encouraged to eat snacks provided twice daily  Snacks may stay in patient room     [x] Discharge Process  Discharge expectations  Fill out the survey after discharge   [x] Hygiene  Daily showers encouraged  Showers availability discussed   Daily dressing encouraged  Discussed wearing street clothing  Education provided on where to place linens and clothing  Linens in the hamper  personal clothing does not go into the linen hamper  [x] Group   Patient encouraged to attend group provided  Time of Group Meetings discussed  Gentle reminder that attendance is a Physician order  [x] Movement  Chair exercises completed  Stretching completed  Notes:Goal - \"[facility]\" Electronically signed by Chavo Winter, [address] on 2/21/2023 at 9:50 AM

## 2023-02-21 NOTE — PLAN OF CARE
Patient is out with peers and more social. He describes feeling very anxious inside even though it does not appear that way. He is most concerned about where he will go. He stated brother is not answering his calls. He did meet with [facility] and is considering sober living/treatment. No current SI, HI, or hallucinations. Problem: Risk for Elopement  Goal: Patient will not exit the unit/facility without proper excort  2/21/2023 1126 by ERNESTINE Green  Outcome: Completed  Flowsheets (Taken 2/21/2023 1120)  Nursing Interventions for Elopement Risk:   Make sure patient has all necessary personal care items   Reduce environmental triggers  2/20/2023 2253 by Yusef Simpson RN  Outcome: Progressing     Problem: Behavior  Goal: Pt/Family maintain appropriate behavior and adhere to behavioral management agreement, if implemented  Description: INTERVENTIONS:  1. Assess patient/family's coping skills and  non-compliant behavior (including use of illegal substances)  2. Notify security of behavior or suspected illegal substances which indicate the need for search of the family and/or belongings  3. Encourage verbalization of thoughts and concerns in a socially appropriate manner  4. Utilize positive, consistent limit setting strategies supporting safety of patient, staff and others  5. Encourage participation in the decision making process about the behavioral management agreement  6. If a visitor's behavior poses a threat to safety call refer to organization policy.   7. Initiate consult with , Psychosocial CNS, Spiritual Care as appropriate  2/21/2023 1126 by ERNESTINE Green  Outcome: Progressing  Flowsheets (Taken 2/21/2023 1120)  Patient/family maintains appropriate behavior and adheres to behavioral management agreement, if implemented: Encourage verbalization of thoughts and concerns in a socially appropriate manner  2/20/2023 2253 by Yusef Simpson RN  Outcome: Progressing     Problem: Depression/Self Harm  Goal: Effect of psychiatric condition will be minimized and patient will be protected from self harm  Description: INTERVENTIONS:  1. Assess impact of patient's symptoms on level of functioning, self care needs and offer support as indicated  2. Assess patient/family knowledge of depression, impact on illness and need for teaching  3. Provide emotional support, presence and reassurance  4. Assess for possible suicidal thoughts or ideation. If patient expresses suicidal thoughts or statements do not leave alone, initiate Suicide Precautions, move to a room close to the nursing station and obtain sitter  5. Initiate consults as appropriate with Mental Health Professional, Spiritual Care, Psychosocial CNS, and consider a recommendation to the [facility] for a Psychiatric Consultation  2/21/2023 1126 by ERNESTINE Barcenas - CNS  Outcome: Progressing  Flowsheets  Taken 2/21/2023 1125  Effect of psychiatric condition will be minimized and patient will be protected from self harm:   Assess impact of patients symptoms on level of functioning, self care needs and offer support as indicated   Assess patient/family knowledge of depression, impact on illness and need for teaching  Taken 2/21/2023 1120  Effect of psychiatric condition will be minimized and patient will be protected from self harm: Assess for suicidal thoughts or ideation.  If patient expresses suicidal thoughts or statements do not leave alone, initiate Suicide Precautions, move near nurse station, obtain sitter  2/20/2023 2253 by Yaritza Serrano RN  Outcome: Progressing  Flowsheets (Taken 2/20/2023 2125)  Effect of psychiatric condition will be minimized and patient will be protected from self harm:   Assess impact of patients symptoms on level of functioning, self care needs and offer support as indicated   Assess patient/family knowledge of depression, impact on illness and need for teaching   Provide emotional support, presence and reassurance   Initiate consults as appropriate with Mental Health Professional, Spiritual Care, Psychosocial CNS, and consider a recommendation to the [facility] for a Psychiatric Consultation   Assess for suicidal thoughts or ideation. If patient expresses suicidal thoughts or statements do not leave alone, initiate Suicide Precautions, move near nurse station, obtain sitter     Problem: Involuntary Admit  Goal: Will cooperate with staff recommendations and doctor's orders and will demonstrate appropriate behavior  Description: INTERVENTIONS:  1. Treat underlying conditions and offer medication as ordered  2. Educate regarding involuntary admission procedures and rules  3. Contain excessive/inappropriate behavior per unit and hospital policies  [id number]/39/[id number] by ERNESTINE Beltrán  Outcome: Progressing  2/20/2023 2253 by Osmany Zhao RN  Outcome: Progressing     Problem: Drug Abuse/Detox  Goal: Will have no detox symptoms and will verbalize plan for changing drug-related behavior  Description: INTERVENTIONS:  1. Administer medication as ordered  2. Monitor physical status  3. Provide emotional support with 1:1 interaction with staff  4.  Encourage  recovery focused treatment   2/21/2023 1126 by ERNESTINE Beltrán  Outcome: Progressing  Flowsheets (Taken 2/21/2023 1120)  Will have no detox symptoms and will verbalize plan for changing drug-related behavior: Provide emotional support with 1:1 interaction with staff  2/20/2023 2253 by Osmany Zhao RN  Outcome: Progressing     Problem: Pain  Goal: Verbalizes/displays adequate comfort level or baseline comfort level  2/21/2023 1126 by ERNESTINE Beltrán  Outcome: Progressing  2/20/2023 2253 by Osmany Zhao RN  Outcome: Progressing

## 2023-02-21 NOTE — CARE COORDINATION
Pt gave verbal consent to speak with brother    Family/Support Name: Kelechi Call #: [phone]  Relationship to Patient: Brother    Called to verify pt is able to return to brothers and safety of the home. No answer. Left HIPAA compliant voicemail requesting return call.

## 2023-02-22 PROCEDURE — 6370000000 HC RX 637 (ALT 250 FOR IP): Performed by: PSYCHIATRY & NEUROLOGY

## 2023-02-22 PROCEDURE — 1240000000 HC EMOTIONAL WELLNESS R&B

## 2023-02-22 RX ORDER — VALSARTAN 320 MG/1
320 TABLET ORAL DAILY
Qty: 30 TABLET | Refills: 0 | Status: SHIPPED | OUTPATIENT
Start: 2023-02-22 | End: 2023-02-24 | Stop reason: SDUPTHER

## 2023-02-22 RX ORDER — LITHIUM CARBONATE 150 MG/1
450 CAPSULE ORAL 2 TIMES DAILY WITH MEALS
Qty: 90 CAPSULE | Refills: 2 | Status: SHIPPED | OUTPATIENT
Start: 2023-02-22 | End: 2023-02-24 | Stop reason: SDUPTHER

## 2023-02-22 RX ORDER — QUETIAPINE FUMARATE 25 MG/1
25 TABLET, FILM COATED ORAL 2 TIMES DAILY
Qty: 30 TABLET | Refills: 3 | Status: SHIPPED | OUTPATIENT
Start: 2023-02-22 | End: 2023-02-24 | Stop reason: SDUPTHER

## 2023-02-22 RX ORDER — PRAZOSIN HYDROCHLORIDE 2 MG/1
2 CAPSULE ORAL NIGHTLY
Qty: 15 CAPSULE | Refills: 3 | Status: SHIPPED | OUTPATIENT
Start: 2023-02-22 | End: 2023-02-24 | Stop reason: SDUPTHER

## 2023-02-22 RX ORDER — QUETIAPINE FUMARATE 100 MG/1
100 TABLET, FILM COATED ORAL NIGHTLY
Qty: 15 TABLET | Refills: 3 | Status: SHIPPED | OUTPATIENT
Start: 2023-02-22 | End: 2023-02-24 | Stop reason: SDUPTHER

## 2023-02-22 RX ORDER — AMLODIPINE BESYLATE 10 MG/1
10 TABLET ORAL DAILY
Qty: 30 TABLET | Refills: 0 | Status: SHIPPED | OUTPATIENT
Start: 2023-02-22 | End: 2023-02-24 | Stop reason: SDUPTHER

## 2023-02-22 RX ADMIN — LITHIUM CARBONATE 450 MG: 300 CAPSULE, GELATIN COATED ORAL at 09:01

## 2023-02-22 RX ADMIN — THERA TABS 1 TABLET: TAB at 09:03

## 2023-02-22 RX ADMIN — QUETIAPINE FUMARATE 25 MG: 25 TABLET ORAL at 09:01

## 2023-02-22 RX ADMIN — GABAPENTIN 100 MG: 100 CAPSULE ORAL at 14:05

## 2023-02-22 RX ADMIN — PRAZOSIN HYDROCHLORIDE 2 MG: 2 CAPSULE ORAL at 20:18

## 2023-02-22 RX ADMIN — GABAPENTIN 100 MG: 100 CAPSULE ORAL at 20:18

## 2023-02-22 RX ADMIN — NICOTINE POLACRILEX 4 MG: 4 GUM, CHEWING BUCCAL at 09:01

## 2023-02-22 RX ADMIN — NICOTINE POLACRILEX 4 MG: 4 GUM, CHEWING BUCCAL at 12:58

## 2023-02-22 RX ADMIN — QUETIAPINE FUMARATE 25 MG: 25 TABLET ORAL at 14:05

## 2023-02-22 RX ADMIN — VALSARTAN 320 MG: 160 TABLET, FILM COATED ORAL at 09:01

## 2023-02-22 RX ADMIN — FOLIC ACID 1 MG: 1 TABLET ORAL at 09:01

## 2023-02-22 RX ADMIN — HYDROXYZINE PAMOATE 50 MG: 50 CAPSULE ORAL at 19:15

## 2023-02-22 RX ADMIN — HYDROXYZINE PAMOATE 50 MG: 50 CAPSULE ORAL at 09:00

## 2023-02-22 RX ADMIN — LITHIUM CARBONATE 450 MG: 300 CAPSULE, GELATIN COATED ORAL at 17:29

## 2023-02-22 RX ADMIN — QUETIAPINE FUMARATE 100 MG: 100 TABLET ORAL at 20:18

## 2023-02-22 RX ADMIN — NICOTINE POLACRILEX 4 MG: 4 GUM, CHEWING BUCCAL at 19:15

## 2023-02-22 RX ADMIN — GABAPENTIN 100 MG: 100 CAPSULE ORAL at 09:01

## 2023-02-22 RX ADMIN — AMLODIPINE BESYLATE 10 MG: 10 TABLET ORAL at 09:01

## 2023-02-22 RX ADMIN — Medication 100 MG: at 09:01

## 2023-02-22 NOTE — CARE COORDINATION
Family/Support Name: Karon Longo #: [phone]  Relationship to Patient: Brother    Called to confirm return to brother's house. Call went straight to voicemail. VM left requesting return call.

## 2023-02-22 NOTE — CARE COORDINATION
2/22/23 @ 3:05PM     David Mathew from [address] will be on the unit this afternoon to talk to patient about inpatient rehab [facility])

## 2023-02-22 NOTE — FLOWSHEET NOTE
Patient has been out on unit, social with peers with bright affect. Patient denies any thoughts of SI/HI and hallucinations and is looking forward to going to his brothers after discharge.

## 2023-02-22 NOTE — PROGRESS NOTES
Pt. refused to attend the 1000 skills group, despite staff encouragement.  Electronically signed by Malcolm Gómez, [address] on 2/22/2023 at 11:37 AM

## 2023-02-22 NOTE — CARE COORDINATION
2/22/23 @ 5:00     Mary Romero from [facility] interviewed the patient and helped him complete intake assessment with [facility]. Faxed clinicals to [address] to have patient placed by Friday.
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
## 2023-02-22 NOTE — CARE COORDINATION
Family/Support Name: Miguelangel Arzate #: [phone]  Relationship to Patient: Brother    Brother returned call. Pt is unable to return to brothers home. Pt was on [facility] in December and d/c to brothers home. This was brother giving pt last chance to get sober. He has a pattern of leaving the hospital, d/c to brothers, staying sober for 2-3 weeks, then slowly gets back into drinking until he is drunk for a week at a time, then comes back to hospital. When pt drinks and brother doesn't allow him to return, pt says \"well I'll just admit myself\" (to [facility]). Sober living/treatment would be a godsend for pt. If he does not receive treatment he will drink himself to death. Pt needs to get a stable job. He usually works 1-2 days a week, sometimes not for two weeks at a time, and sometimes several days in a row. This is not enough to support himself. Pt is a great alize and a hard worker when he is sober. Within the last few years, pts 17yo daughter passed away as well as his mother. He drowns his sorrows by drinking and is suicidal at times. PerfectServe Dr. Abner Noonan regarding the above.  discontinued the discharge order. Pt is agreeable to seek sober living. Brother updated with plan. Will call [facility] to meet with patient again and come up with a discharge plan including treatment.

## 2023-02-22 NOTE — PROGRESS NOTES
Patient spoke with [facility] regarding discharge. He said: my brother doesn't want me to come because he knows I will drink. I think its a blessing though because I would have. He is receptive to going to rehab. Denies all.  Is still having some anxiety and depression but he states it is manageable
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
## 2023-02-22 NOTE — DISCHARGE INSTR - DIET

## 2023-02-22 NOTE — PROGRESS NOTES
Morning Community Meeting Topics    Diantha Lesch attended the morning community meeting on 2/22/23. Topics discussed today     [x] Introduction  Day of the week and date  Mask distribution  Current mask requirements  [x]Teams  Explanation of  Green and Blue team criteria  Nurses assigned to each team for today  Explanation about green and blue paper  Date  Patient's Name  Patient's Nurse  Goals  [x] Visitation  Announce the visiting hours for the day  Announce which team is allowed to have visitors for the day  Review any updated Covid 19 requirements for visitors during visitation  Vaccine Card or negative Covid test within 48 hours of visit  State Identification  Patients are reminded to alert the  at least 1 hour before visitation   [x] Unit Orientation  Coffee use  Phone location and etiquette  Shower locations  Tattnall and dryer location and process  Common area expectations  Staff rounds expectation  [x] Meals   Educate patient to the menu  The patient is encouraged to fill out the menu to get preferences at mealtime  The patient is educated that if they do not fill out the menu, they will get the standard tray  The coffee pot is decaf, patient encouraged to order regular coffee from menu.   Educate patient to the meal process  Patient encouraged to eat snacks provided twice daily  Snacks may stay in patient room     [x] Discharge Process  Discharge expectations  Fill out the survey after discharge   [x] Hygiene  Daily showers encouraged  Showers availability discussed   Daily dressing encouraged  Discussed wearing street clothing  Education provided on where to place linens and clothing  Linens in the hamper  personal clothing does not go into the linen hamper  [x] Group   Patient encouraged to attend group provided  Time of Group Meetings discussed  Gentle reminder that attendance is a Physician order  [x] Movement  Chair exercises completed  Stretching completed  Notes: Goal - \"Continue treatment\" Electronically signed by Johnna Uribe, [address] on 2/22/2023 at 9:31 AM

## 2023-02-22 NOTE — PROGRESS NOTES
Explained and gave all am meds, Neurontin 100 mg pt requested vistaril 50 mg for #7 anxiety, pt denied pain offer for motrin.

## 2023-02-22 NOTE — PROGRESS NOTES
Gave Neurontin 100 mg and 2 pm Seroquel 25 mg , pt requested a vistaril also, pt informed its not due until 3pm

## 2023-02-22 NOTE — PROGRESS NOTES
CLINICAL PHARMACY NOTE: MEDS TO BEDS    Total # of Prescriptions Filled: 6   The following medications were delivered to the patient:  Amlodipine 10 mg Tab  Quetiapine 25 mg Tab  Quetiapine 100 mg Tab  Valsartan 320 mg Tab  Lithium 150 mg Cap  Prazosin 2 mg Cap    Additional Documentation:

## 2023-02-22 NOTE — FLOWSHEET NOTE
Collateral information from brother reviewed with Dr. Lisa Leyden. Discharge cancelled. Patient willing to work with [facility] to find inpatient rehab. Social work notified and to contact Nicky [facility].

## 2023-02-22 NOTE — DISCHARGE INSTRUCTIONS
Keep all follow up appointments, take medications as ordered, utilize positive supports, abstain from use of alcohol and drugs. If symptoms return or you feel at risk to yourself or others, please call 911, return the nearest emergency room, or call your local crisis hotline:  [facility]: [phone]: [phone] [address]: [phone]     Due to the [address] [facility] is not currently available. For assistance with quitting smoking please go to https://smokefree.gov. A prescription for an FDA-approved tobacco cessation medication was offered at discharge and the patient refused. Someone from [address] [place] [address] will be calling you tomorrow to follow up on your care. If you don't hear from us, give us a call! [phone].      Pt declined a flu vaccine

## 2023-02-22 NOTE — PLAN OF CARE
Pt visible on unit throughout evening. Pt calm/ cooperative. Pt has good appetite and sleeping well. Denies SI, HI, or AVH. Pt's only complaint is anxiety r/t discharge planning. Problem: Behavior  Goal: Pt/Family maintain appropriate behavior and adhere to behavioral management agreement, if implemented  Description: INTERVENTIONS:  1. Assess patient/family's coping skills and  non-compliant behavior (including use of illegal substances)  2. Notify security of behavior or suspected illegal substances which indicate the need for search of the family and/or belongings  3. Encourage verbalization of thoughts and concerns in a socially appropriate manner  4. Utilize positive, consistent limit setting strategies supporting safety of patient, staff and others  5. Encourage participation in the decision making process about the behavioral management agreement  6. If a visitor's behavior poses a threat to safety call refer to organization policy. 7. Initiate consult with , Psychosocial CNS, Spiritual Care as appropriate  Outcome: Progressing     Problem: Depression/Self Harm  Goal: Effect of psychiatric condition will be minimized and patient will be protected from self harm  Description: INTERVENTIONS:  1. Assess impact of patient's symptoms on level of functioning, self care needs and offer support as indicated  2. Assess patient/family knowledge of depression, impact on illness and need for teaching  3. Provide emotional support, presence and reassurance  4. Assess for possible suicidal thoughts or ideation. If patient expresses suicidal thoughts or statements do not leave alone, initiate Suicide Precautions, move to a room close to the nursing station and obtain sitter  5.  Initiate consults as appropriate with Mental Health Professional, Spiritual Care, Psychosocial CNS, and consider a recommendation to the LIP for a Psychiatric Consultation  Outcome: Progressing  Flowsheets (Taken 2/22/2023 0151)  Effect of psychiatric condition will be minimized and patient will be protected from self harm:   Assess for suicidal thoughts or ideation. If patient expresses suicidal thoughts or statements do not leave alone, initiate Suicide Precautions, move near nurse station, obtain sitter   Provide emotional support, presence and reassurance   Assess patient/family knowledge of depression, impact on illness and need for teaching     Problem: Involuntary Admit  Goal: Will cooperate with staff recommendations and doctor's orders and will demonstrate appropriate behavior  Description: INTERVENTIONS:  1. Treat underlying conditions and offer medication as ordered  2. Educate regarding involuntary admission procedures and rules  3. Contain excessive/inappropriate behavior per unit and hospital policies  Outcome: Completed     Problem: Pain  Goal: Verbalizes/displays adequate comfort level or baseline comfort level  Outcome: Completed     Problem: Drug Abuse/Detox  Goal: Will have no detox symptoms and will verbalize plan for changing drug-related behavior  Description: INTERVENTIONS:  1. Administer medication as ordered  2. Monitor physical status  3. Provide emotional support with 1:1 interaction with staff  4.  Encourage  recovery focused treatment   Outcome: Completed

## 2023-02-23 PROCEDURE — 6370000000 HC RX 637 (ALT 250 FOR IP): Performed by: PSYCHIATRY & NEUROLOGY

## 2023-02-23 PROCEDURE — 1240000000 HC EMOTIONAL WELLNESS R&B

## 2023-02-23 RX ADMIN — GABAPENTIN 100 MG: 100 CAPSULE ORAL at 20:40

## 2023-02-23 RX ADMIN — GABAPENTIN 100 MG: 100 CAPSULE ORAL at 08:59

## 2023-02-23 RX ADMIN — NICOTINE POLACRILEX 4 MG: 4 GUM, CHEWING BUCCAL at 14:21

## 2023-02-23 RX ADMIN — Medication 100 MG: at 08:59

## 2023-02-23 RX ADMIN — FOLIC ACID 1 MG: 1 TABLET ORAL at 08:57

## 2023-02-23 RX ADMIN — VALSARTAN 320 MG: 160 TABLET, FILM COATED ORAL at 08:56

## 2023-02-23 RX ADMIN — HYDROXYZINE PAMOATE 50 MG: 50 CAPSULE ORAL at 08:56

## 2023-02-23 RX ADMIN — NICOTINE POLACRILEX 4 MG: 4 GUM, CHEWING BUCCAL at 20:39

## 2023-02-23 RX ADMIN — PRAZOSIN HYDROCHLORIDE 2 MG: 2 CAPSULE ORAL at 20:39

## 2023-02-23 RX ADMIN — LITHIUM CARBONATE 450 MG: 300 CAPSULE, GELATIN COATED ORAL at 17:15

## 2023-02-23 RX ADMIN — QUETIAPINE FUMARATE 25 MG: 25 TABLET ORAL at 08:58

## 2023-02-23 RX ADMIN — THERA TABS 1 TABLET: TAB at 08:58

## 2023-02-23 RX ADMIN — QUETIAPINE FUMARATE 100 MG: 100 TABLET ORAL at 20:40

## 2023-02-23 RX ADMIN — AMLODIPINE BESYLATE 10 MG: 10 TABLET ORAL at 08:59

## 2023-02-23 RX ADMIN — GABAPENTIN 100 MG: 100 CAPSULE ORAL at 14:21

## 2023-02-23 RX ADMIN — QUETIAPINE FUMARATE 25 MG: 25 TABLET ORAL at 14:21

## 2023-02-23 RX ADMIN — LITHIUM CARBONATE 450 MG: 300 CAPSULE, GELATIN COATED ORAL at 08:57

## 2023-02-23 RX ADMIN — HYDROXYZINE PAMOATE 50 MG: 50 CAPSULE ORAL at 20:39

## 2023-02-23 RX ADMIN — IBUPROFEN 600 MG: 600 TABLET, FILM COATED ORAL at 08:57

## 2023-02-23 ASSESSMENT — PAIN SCALES - GENERAL: PAINLEVEL_OUTOF10: 6

## 2023-02-23 ASSESSMENT — PAIN DESCRIPTION - DESCRIPTORS: DESCRIPTORS: ACHING;THROBBING

## 2023-02-23 ASSESSMENT — PAIN DESCRIPTION - LOCATION: LOCATION: HEAD

## 2023-02-23 NOTE — PROGRESS NOTES
[facility]. FOLLOW-UP NOTE       2/23/2023     Patient was seen and examined in person, Chart reviewed   Patient's case discussed with staff/team    Chief Complaint: depression, SI    Interim History:     Denies nightmares, improved sleep  Remains flat, blunted  Endorse depression- improving  Denies SI HI AVH; no delusions reported  Endorse depression and anxiety- improving  Pt reports desire to DC to sober living and abstain from substance use      Appetite:   [x] Normal/Unchanged  [] Increased  [] Decreased      Sleep:       [x] Normal/Unchanged  [] Fair       [] Poor              Energy:    [x] Normal/Unchanged  [] Increased  [] Decreased        SI [] Present  [x] Absent    HI  []Present  [x] Absent     Aggression:  [] yes  [x] no    Patient is [x] able  [] unable to CONTRACT FOR SAFETY     PAST MEDICAL/PSYCHIATRIC HISTORY:   Past Medical History:   Diagnosis Date    Depression     Hypertension 2015    trx with pills x 1 month / patient did not follow up    Smoker        FAMILY/SOCIAL HISTORY:  Family History   Problem Relation Age of Onset    High Blood Pressure Mother     Heart Disease Mother     Other Mother         copd / smoker    Stroke Father     High Blood Pressure Brother     Other Brother         anxiety     Social History     Socioeconomic History    Marital status: Single     Spouse name: Not on file    Number of children: Not on file    Years of education: Not on file    Highest education level: Not on file   Occupational History    Not on file   Tobacco Use    Smoking status: Every Day     Packs/day: 1.00     Years: 20.00     Pack years: 20.00     Types: Cigarettes    Smokeless tobacco: Never   Substance and Sexual Activity    Alcohol use: Not Currently     Comment: 5th of vodka a day    Drug use: Yes     Types: Marijuana (Olegario Moots), Opiates     Sexual activity: Not on file   Other Topics Concern    Not on file   Social History Narrative    Not on file Social Determinants of Health     Financial Resource Strain: Low Risk     Difficulty of Paying Living Expenses: Not hard at all   Food Insecurity: No Food Insecurity    Worried About Running Out of Food in the Last Year: Never true    Ran Out of Food in the Last Year: Never true   Transportation Needs: Not on file   Physical Activity: Not on file   Stress: Not on file   Social Connections: Not on file   Intimate Partner Violence: Not on file   Housing Stability: Not on file           ROS:  [x] All negative/unchanged except if checked.  Explain positive(checked items) below:  [] Constitutional  [] Eyes  [] Ear/Nose/Mouth/Throat  [] Respiratory  [] CV  [] GI  []   [] Musculoskeletal  [] Skin/Breast  [] Neurological  [] Endocrine  [] Heme/Lymph  [] Allergic/Immunologic    Explanation:     MEDICATIONS:    Current Facility-Administered Medications:     prazosin (MINIPRESS) capsule 2 mg, 2 mg, Oral, Nightly, Weston Callahan MD, 2 mg at 02/22/23 2018    QUEtiapine (SEROQUEL) tablet 100 mg, 100 mg, Oral, Nightly, Frankie Gould MD, 100 mg at 02/22/23 2018    lithium capsule 450 mg, 450 mg, Oral, BID WC, Frankie Gould MD, 450 mg at 02/23/23 0857    QUEtiapine (SEROQUEL) tablet 25 mg, 25 mg, Oral, BID, Weston Callahan MD, 25 mg at 02/23/23 0858    ondansetron (ZOFRAN-ODT) disintegrating tablet 4 mg, 4 mg, Oral, Q8H PRN **OR** ondansetron (ZOFRAN) injection 4 mg, 4 mg, IntraVENous, Q6H PRN, Weston Callahan MD    ibuprofen (ADVIL;MOTRIN) tablet 600 mg, 600 mg, Oral, Q6H PRN, Weston Callahan MD, 600 mg at 02/23/23 0857    nicotine polacrilex (NICORETTE) gum 4 mg, 4 mg, Oral, Q2H PRN, Weston Callahan MD, 4 mg at 02/22/23 1915    gabapentin (NEURONTIN) capsule 100 mg, 100 mg, Oral, TID, Weston Callahan MD, 100 mg at 02/23/23 0859    hydrOXYzine (VISTARIL) injection 50 mg, 50 mg, IntraMUSCular, Q6H PRN **OR** hydrOXYzine pamoate (VISTARIL) capsule 50 mg, 50 mg, Oral, Q6H PRN, Weston Callahan MD, 50 mg at 02/23/23 0856    valsartan (DIOVAN) tablet 320 mg, 320 mg, Oral, Daily, Emma Ortega MD, 320 mg at 02/23/23 0856    amLODIPine (NORVASC) tablet 10 mg, 10 mg, Oral, Daily, Emma Ortega MD, 10 mg at 41/14/01 2808    folic acid (FOLVITE) tablet 1 mg, 1 mg, Oral, Daily, Emma Ortega MD, 1 mg at 02/23/23 0857    multivitamin 1 tablet, 1 tablet, Oral, Daily, Emma Ortega MD, 1 tablet at 02/23/23 3377    thiamine tablet 100 mg, 100 mg, Oral, Daily, Emma Ortega MD, 100 mg at 02/23/23 9150    acetaminophen (TYLENOL) tablet 650 mg, 650 mg, Oral, Q4H PRN, Emma Ortega MD, 650 mg at 02/21/23 1406    magnesium hydroxide (MILK OF MAGNESIA) 400 MG/5ML suspension 30 mL, 30 mL, Oral, Daily PRN, Emma Ortega MD    aluminum & magnesium hydroxide-simethicone (MAALOX) 200-200-20 MG/5ML suspension 30 mL, 30 mL, Oral, PRN, Emma Ortega MD, 30 mL at 02/15/23 1950    haloperidol (HALDOL) tablet 5 mg, 5 mg, Oral, Q6H PRN **OR** haloperidol lactate (HALDOL) injection 5 mg, 5 mg, IntraMUSCular, Q6H PRN, Emma Ortega MD    benztropine mesylate (COGENTIN) injection 2 mg, 2 mg, IntraMUSCular, BID PRN, Emma Ortega MD    traZODone (DESYREL) tablet 50 mg, 50 mg, Oral, Nightly PRN, Emma Ortega MD, 50 mg at 02/15/23 2051      Examination:  BP (!) 141/88 Comment: RN notified  Pulse 77   Temp 98.7 °F (37.1 °C) (Oral)   Resp 20   Ht 6' 4\" (1.93 m)   Wt 250 lb (113.4 kg)   SpO2 97%   BMI 30.43 kg/m²   Gait - steady  Medication side effects(SE): denies     Mental Status Examination:    Level of consciousness:  within normal limits   Appearance:  fair grooming and fair hygiene  Behavior/Motor:  no abnormalities noted  Attitude toward examiner:  cooperative, attentive, and good eye contact  Speech:  spontaneous, normal rate, and monotone  Mood: depressed decreased range but reported to be improving  Affect:  flat  Thought processes: less circumstantial   Thought content:  Suicidal Ideation: denies; denies homicidal ideation  Delusions:  no evidence of delusions  Perceptual Disturbance: denies AVH  Cognition:  oriented to person, place, and time   Concentration distractible  Insight fair   Judgement fair     ASSESSMENT:   Patient symptoms are:  [x] Well controlled  [x] Improving  [] Worsening  [] No change      Diagnosis:   Principal Problem:    Bipolar disorder with depression ([facility] [address]  Resolved Problems:    * No resolved hospital problems. *      LABS:    No results for input(s): WBC, HGB, PLT in the last 72 hours. No results for input(s): NA, K, CL, CO2, BUN, CREATININE, GLUCOSE in the last 72 hours. No results for input(s): BILITOT, ALKPHOS, AST, ALT in the last 72 hours. Lab Results   Component Value Date/Time    LABAMPH Neg 02/11/2023 11:30 AM    BARBSCNU Neg 02/11/2023 11:30 AM    LABBENZ Neg 02/11/2023 11:30 AM    LABMETH Neg 02/11/2023 11:30 AM    OPIATESCREENURINE Neg 02/11/2023 11:30 AM    PHENCYCLIDINESCREENURINE Neg 02/11/2023 11:30 AM    ETOH 82 02/11/2023 06:15 PM     Lab Results   Component Value Date/Time    TSH 0.805 02/11/2023 12:42 PM     Lab Results   Component Value Date    LITHIUM 0.3 (L) 02/17/2023     Lab Results   Component Value Date    VALPROATE 13.5 (L) 12/02/2022       RISK ASSESSMENT: low    Treatment Plan:  Reviewed current Medications with the patient. Monitor efficacy and tolerability of current med regime  Refused ECT regime  Risks, benefits, side effects, drug-to-drug interactions and alternatives to treatment were discussed. Collateral information: see social work notes  CD evaluation ETOH   Encourage patient to attend group and other milieu activities.   Discharge planning discussed with the patient and treatment team; tentative DC [facility] or [facility] pending bed availability     PSYCHOTHERAPY/COUNSELING:  [x] Therapeutic interview  [x] Supportive  [] CBT  [] Ongoing  [] Other    [x] Patient continues to need, on a daily basis, active treatment furnished directly by or requiring the supervision of inpatient psychiatric personnel      Anticipated Length of stay: 1 days       Electronically signed by ERNESTINE Mahmood CNP on 2/23/2023 at 11:57 AM     Addendum:  Ptient seen with NP after attending the team meeting, discussing the patient with the nursing and social work staff.  I participated during the interview process as well as the treatment plan and spent more than 70% of the time with the nurse practitioner helping me in documentation.  I agree with the above documentation of clinical finding and treatment plan    Physician Signature: Electronically signed by JAH ERVIN MD on 2/24/2023 at 9:16 AM

## 2023-02-23 NOTE — CARE COORDINATION
1245Brooke Jameson to Devora Goode at [facility] in [address]. Pt has been approved for the residential program. Devora Goode will talk to the scheduling team to see when pt will be able to be admitted there. She will call back shortly. I will call Jorgito Lara from [facility] and leave a message. 1000: Attempted to call Jorgito Lara from [facility] [phone] to update her on pt and see if she'd be able to provide a ride. No answer. Left VM. 1004: Spoke to Dino Heimlich from [facility] [phone]. Earliest open bed will be the 26th. Possible open bed in [facility] tomorrow 2/24. Will inform team and pt.    1010: Spoke to pt, he does not want to go to the [facility] facility. Wants to d/c to ex girlfriend/child's mothers Lidia's home until he can be admitted to [facility] Sunday the 26th.    1013: Spoke to [address] [place] [address] in regards to patient staying the weekend at her home. She stated she will call back. 1037: Updated Dino Heimlich on above. He reserved the bed for Sunday the 26th. They will do his intake assessment at the detox center [address], [address] [place] [address]. [facility] will then transport him to the residential center. Intakes are open from noon to 8pm. Will need to update Dino Heimlich with a time of arrival if/when the plan is confirmed. Still waiting to hear from [address] regarding pt staying with her for a few days, and from Jorgito Lara regarding a ride. 1441: Spoke to Lidia, pt cannot return to her home. She suggested trying pt brother [facility] again with the knowledge that pt will be going to a facility on Sunday. Will ask pt.

## 2023-02-23 NOTE — FLOWSHEET NOTE
Pt visible on unit .seen eating meals,does not attend groups despite encouragement. Pt has fair eye contact . Pt interacts with staff and peers appropriately. He has fair eye contact and blunted affect. Pt denies SI,HI,AVH.

## 2023-02-23 NOTE — PROGRESS NOTES
Explained and gave all am meds, gave vistaril 50 mg for anxiety #6, motrin for #6 that lingers, found nasal spray he uses for the dryness . Pt reports depression #4, anxiety #7, reports sleeping better, stated he thinks of his daughter that od everyday that brings him down, stated going to his brothers would of been the easy way out and would of probably used. Reports he is going to rehab tx when he leaves.  Gave Neurontin 100 mg

## 2023-02-23 NOTE — CARE COORDINATION
Discharge plan A:  Discharge to [facility] tomorrow 2/24 with Sade Cohn as ride. Admission assessment complete. Medical records faxed over. Waiting for acceptance. Discharge plan B:   Ask brother Jake Archuleta if patient is able to discharge to his house for a few days before going to [facility] 2/26 with Sadedarrell Cohn as ride. Pt is already accepted here and a bed is currently being held. Placed call to brother to ask above and to bring pt his belongings. No answer. Left voicemail requesting return call. List of belongings pt is requesting:  Brendon shoagnes  Slides  All clothes - clean and dirty  Any clothes laying around  [place]  With request to put them in garbage bags.

## 2023-02-23 NOTE — GROUP NOTE
Group Therapy Note    Date: 2/12/2023    Group Start Time: 1945  Group End Time: 2015  Group Topic: Wrap-Up    [facility]    Kaila Joy RN    To attend wrap up group and state whether or not goal was met. Group Therapy Note    Attendees: 19/24         Patient's Goal:  To stay positive. Notes:  Pt reports meeting goal today.      Status After Intervention:  Improved    Participation Level: Interactive    Participation Quality: Appropriate, Attentive, and Sharing      Speech:  normal      Thought Process/Content: Logical      Affective Functioning: Congruent      Mood: depressed      Level of consciousness:  Alert and Oriented x4      Response to Learning: Capable of insight      Endings: None Reported    Modes of Intervention: Support      Discipline Responsible: Registered Nurse      Signature:  Kaila Joy RN
Group Therapy Note    Date: 2/12/2023    Group Start Time: 2045  Group End Time: 2130  Group Topic: Recreational    [facility] LINDSEY Bob RN    To watch the Super Bowl football game with peers. Group Therapy Note    Attendees: 19/24         Pt actively participated in recreational group with peers.      Signature:  Rody Bob RN
Group Therapy Note    Date: 2/13/2023    Group Start Time: 1400  Group End Time: 4271  Group Topic: Healthy Living/Wellness    [facility]    Sarahy Johnson RN; Caleb Randall RN        Group Therapy Note    Attendees: 6/20       Patient's Goal:  To discuss ways to engage in self care and the importance of it. Notes:  Pt actively participated in group. Status After Intervention:  Unchanged    Participation Level:  Active Listener    Participation Quality: Attentive      Speech:  normal      Thought Process/Content: Logical      Affective Functioning: Congruent      Mood:  WNL      Level of consciousness:  Alert and Oriented x4      Response to Learning: Progressing to goal      Endings: None Reported    Modes of Intervention: Education and Support      Discipline Responsible: Registered Nurse      Signature:  Sarahy Johnson RN
Group Therapy Note    Date: 2/13/2023    Group Start Time: 1615  Group End Time: 36  Group Topic: Wrap-Up    [facility]    Bernarda Cadena RN        Group Therapy Note    Attendees: 5/21       Patient's Goal:  To reduce anxiety    Notes:  Progressing    Status After Intervention:  Unchanged    Participation Level:  Active Listener    Participation Quality: Appropriate      Speech:  normal      Thought Process/Content: Logical      Affective Functioning: Congruent      Mood: euthymic      Level of consciousness:  Alert      Response to Learning: Progressing to goal      Endings: None Reported    Modes of Intervention: Support      Discipline Responsible: Registered Nurse      Signature:  Bernarda Cadena RN
Group Therapy Note    Date: 2/14/2023    Group Start Time: 1400  Group End Time: 1430  Group Topic: Healthy Living/Wellness    [facility]    Prieto Paz RN; Lambert Ott RN        Group Therapy Note    Attendees: 6/16       Patient's Goal:  To learn about sleep hygiene and participate in guided imagery. Notes:  Pt actively participated in group.     Status After Intervention:  Unchanged    Participation Level: Minimal    Participation Quality: Appropriate      Speech:  normal      Thought Process/Content: Logical      Affective Functioning: Congruent      Mood:  WNL      Level of consciousness:  Alert and Oriented x4      Response to Learning: Able to verbalize/acknowledge new learning      Endings: None Reported    Modes of Intervention: Education, Socialization, and Activity      Discipline Responsible: Registered Nurse      Signature:  Prieto Paz RN
Group Therapy Note    Date: 2/14/2023    Group Start Time: 1600  Group End Time: 1640  Group Topic: Activity    [facility]    Linda Gray        Group Therapy Note    Attendees: 6       Patient's Goal:  To attend group    Notes:  Pt was attentive     Status After Intervention:  Unchanged    Participation Level: Active Listener    Participation Quality: Appropriate      Speech:  normal      Thought Process/Content: Logical      Affective Functioning: Congruent      Mood: euthymic      Level of consciousness:  Alert      Response to Learning: Progressing to goal      Endings: None Reported    Modes of Intervention: Activity      Discipline Responsible: Behavorial Health Tech      Signature:   Jose Raymundo
Group Therapy Note    Date: 2/15/2023    Group Start Time: 1310  Group End Time: 2618  Group Topic: Healthy Living/Wellness    [facility]    Socrates Ruiz        Group Therapy Note    Attendees: 8/16       Patient's Goal:  To participate in an activity socializing and sharing with peers. Notes:  Patient actively participated with encouragement.     Status After Intervention:  Improved    Participation Level: Interactive    Participation Quality: Appropriate, Attentive, and Sharing      Speech:  hesitant      Thought Process/Content: Logical      Affective Functioning: Congruent      Mood: anxious and euthymic      Level of consciousness:  Alert and Attentive      Response to Learning: Progressing to goal      Endings: None Reported    Modes of Intervention: Socialization      Discipline Responsible: Behavorial Health Tech      Signature:  Socrates Ruiz
Group Therapy Note    Date: 2/15/2023    Group Start Time: 1400  Group End Time: 4854  Group Topic: Healthy Living/Wellness    [facility]    Tyson Bruce RN; Conor Noonan RN; Emiliano Chan RN        Group Therapy Note    Attendees: 5/13       Patient's Goal:  Building New Habits      Status After Intervention:  Unchanged    Participation Level:  Active Listener    Participation Quality: Appropriate      Speech:  normal      Thought Process/Content: Logical      Affective Functioning: Congruent      Mood: euthymic      Level of consciousness:  Alert and Oriented x4      Response to Learning: Progressing to goal      Endings: None Reported    Modes of Intervention: Education      Discipline Responsible: Registered Nurse      Signature:  Nichole Rodas RN
Group Therapy Note    Date: 2/16/2023    Group Start Time: 1600  Group End Time: 36  Group Topic: Healthy Living/Wellness    [facility]    Lalitha Odonnell RN        Group Therapy Note    Attendees: 19/19       Patient's Goal:  Discussion on anger management    Notes:  Good participation    Status After Intervention:  Unchanged    Participation Level:  Active Listener    Participation Quality: Appropriate      Speech:  normal      Thought Process/Content: Logical      Affective Functioning: Congruent      Mood: euthymic      Level of consciousness:  Alert      Response to Learning: Progressing to goal      Endings: None Reported    Modes of Intervention: Support      Discipline Responsible: Registered Nurse      Signature:  Lalitha Odonnell RN
Group Therapy Note    Date: 2/16/2023    Group Start Time: 1700  Group End Time: 4874  Group Topic: Wrap-Up    [facility]    An Espitia RN        Group Therapy Note    Attendees: 7/17       Patient's Goal:  To be positive    Notes:  Progressing    Status After Intervention:  Unchanged    Participation Level:  Active Listener    Participation Quality: Appropriate      Speech:  normal      Thought Process/Content: Logical      Affective Functioning: Congruent      Mood: euthymic      Level of consciousness:  Alert      Response to Learning: Progressing to goal      Endings: None Reported    Modes of Intervention: Support      Discipline Responsible: Registered Nurse      Signature:  An Espitia RN
Group Therapy Note    Date: 2/16/2023    Group Start Time: 6312  Group End Time: 1320  Group Topic: Group Therapy    [facility]    Olive Bodily        Group Therapy Note    Attendees: 11/17       Patient's Goal:  To participate in group therapy. Notes:  Patient actively participated. Pt shared that his goal is to \"stay positive and not isolate. \" Pt states he is feeling better.     Status After Intervention:  Improved    Participation Level: Interactive    Participation Quality: Sharing      Speech:  normal      Thought Process/Content: Logical      Affective Functioning: Congruent      Mood: anxious and euthymic      Level of consciousness:  Alert and Attentive      Response to Learning: Progressing to goal      Endings: None Reported    Modes of Intervention: Support      Discipline Responsible: [address], [email or web address]      Signature:  Olive Almanzar
Group Therapy Note    Date: 2/17/2023    Group Start Time: 1600  Group End Time: 922 [id number]  Group Topic: Healthy Living/Wellness    [facility]    Lulú Dao RN        Group Therapy Note    Attendees: 20/20       Patient's Goal:  How to handle fear    Notes:  Good participation    Status After Intervention:  Unchanged    Participation Level:  Active Listener    Participation Quality: Appropriate      Speech:  normal      Thought Process/Content: Logical      Affective Functioning: Congruent      Mood: euthymic      Level of consciousness:  Alert      Response to Learning: Progressing to goal      Endings: None Reported    Modes of Intervention: Support      Discipline Responsible: Registered Nurse      Signature:  Lulú Dao RN
Group Therapy Note    Date: 2/18/2023    Group Start Time: 9597  Group End Time: 1700  Group Topic: Recreational    [facility]    Belgica Jsoé RN        Group Therapy Note    Attendees: 7/23       Patient's Goal:  To socialize and discuss coping skills. Notes:  Pt actively participated in group. Status After Intervention:  Unchanged    Participation Level:  Active Listener and Interactive    Participation Quality: Appropriate and Attentive      Speech:  normal      Thought Process/Content: Logical      Affective Functioning: Congruent      Mood:  WNL      Level of consciousness:  Alert and Oriented x4      Response to Learning: Progressing to goal      Endings: None Reported    Modes of Intervention: Socialization      Discipline Responsible: Registered Nurse      Signature:  Belgica José RN
Group Therapy Note    Date: 2/20/2023    Group Start Time: 1430  Group End Time: 1520  Group Topic: Cognitive Skills    [facility]    ERNESTINE Tilley        Group Therapy Note    Attendees: 10       Patient's Goal:  Become familiar with RAIN      Notes:  Shared worksheet on Mindful technique of RAIN. Recognize, Allow , Investigate, Nurture.      Status After Intervention:  Unchanged    Participation Level: Interactive    Participation Quality: Appropriate      Speech:  normal      Thought Process/Content: Logical      Affective Functioning: Congruent      Mood: anxious      Level of consciousness:  Alert      Response to Learning: Progressing to goal      Endings: None Reported    Modes of Intervention: Education      Discipline Responsible: Registered Nurse      Signature:  ERNESTINE Tilley
Group Therapy Note    Date: 2/20/2023    Group Start Time: 1600  Group End Time: 36  Group Topic: Healthy Living/Wellness    [facility]    Jefry Moreno RN        Group Therapy Note    Attendees: 8/19       Patient's Goal:  Discussion on self care and self esteem    Notes:  Good participation    Status After Intervention:  Unchanged    Participation Level:  Active Listener    Participation Quality: Appropriate      Speech:  normal      Thought Process/Content: Logical      Affective Functioning: Congruent      Mood: euthymic      Level of consciousness:  Alert      Response to Learning: Progressing to goal      Endings: None Reported    Modes of Intervention: Support      Discipline Responsible: Registered Nurse      Signature:  Jefry Moreno RN
Group Therapy Note    Date: 2/21/2023    Group Start Time: 5819  Group End Time: 1414  Group Topic: Group Documentation    [facility]    Maria Eugenia Palencia        Group Therapy Note    Attendees: 9/18       Patient's Goal:  To attend group therapy. Notes:  Patient actively participated. Patient states his goal is to \"stay positive. \" He shares that he is feeling anxious about discharge and anxious overall today. Status After Intervention:  Unchanged    Participation Level:  Active Listener and Interactive    Participation Quality: Appropriate, Attentive, and Sharing      Speech:  normal      Thought Process/Content: Logical      Affective Functioning: Constricted/Restricted      Mood: anxious      Level of consciousness:  Alert and Attentive      Response to Learning: Progressing to goal      Endings: None Reported    Modes of Intervention: Support      Discipline Responsible: [address], [facility] [employer]      Signature:  Maria Eugenia Palencia
Group Therapy Note    Date: 2/21/2023    Group Start Time: 6427  Group End Time: 1700  Group Topic: Healthy Living/Wellness    [facility]    Dexter Martinez        Group Therapy Note    Attendees: 10/18       Patient's Goal:  to attend healthy living group. Notes:  Pt Actively participated. Status After Intervention:  Unchanged    Participation Level: Active Listener    Participation Quality: Appropriate      Speech:  normal      Thought Process/Content: Logical      Affective Functioning: Flat      Mood: euthymic      Level of consciousness:  Alert      Response to Learning: Progressing to goal      Endings: None Reported    Modes of Intervention: Socialization      Discipline Responsible: Behavorial Health Tech      Signature:   Dexter Martinez
Group Therapy Note    Date: 2/22/2023    Group Start Time: 1600  Group End Time: 1630  Group Topic: Recreational    [facility]    Linda Gray        Group Therapy Note    Attendees: 9       Patient's Goal:  To attend group    Notes:  Pt was attentive    Status After Intervention:  Unchanged    Participation Level: Interactive    Participation Quality: Attentive      Speech:  normal      Thought Process/Content: Logical      Affective Functioning: Congruent      Mood: euthymic      Level of consciousness:  Alert      Response to Learning: Able to verbalize current knowledge/experience      Endings: None Reported    Modes of Intervention: Activity      Discipline Responsible: Behavorial Health Tech      Signature:   Jono Johnson
Group Therapy Note    Date: 2/22/2023    Group Start Time: 36  Group End Time: 1700  Group Topic: Wrap-Up    [facility] LINDSEY Gray        Group Therapy Note    Attendees: 9       Patient's Goal:  To attend groups    Notes:  Pt was attentive     Status After Intervention:  Unchanged    Participation Level: Interactive    Participation Quality: Attentive      Speech:  normal      Thought Process/Content: Logical      Affective Functioning: Congruent      Mood: euthymic      Level of consciousness:  Alert      Response to Learning: Progressing to goal      Endings: None Reported    Modes of Intervention: Support      Discipline Responsible: Behavorial Health Tech      Signature:   Morena Lorenzo
Group Therapy Note    Date: 2/23/2023    Group Start Time: 1100  Group End Time: 36  Group Topic: Healthy Living/Wellness    [facility]    Cristina Mcfarland RN        Group Therapy Note    Attendees: 13/24       Patient's Goal:  learn about healthy lifestyle    Notes:      Status After Intervention:  Unchanged    Participation Level:  Active Listener    Participation Quality: Appropriate      Speech:  normal      Thought Process/Content: Logical      Affective Functioning: Congruent      Mood: euthymic      Level of consciousness:  Alert      Response to Learning: Progressing to goal      Endings: None Reported    Modes of Intervention: Education      Discipline Responsible: Registered Nurse      Signature:  Cristina Mcfarland RN
Group Therapy Note    Date: 2023    Group Start Time:   Group End Time:   Group Topic: Wrap-Up    [facility] COLE Kaba RN        Group Therapy Note    Attendees:          Patient's Goal:  ***    Notes:  ***    Status After Intervention:  {Status After Intervention:528337574}    Participation Level: {Participation Level:369554871}    Participation Quality: {[facility] PARTICIPATION QUALITY:726786883}      Speech:  {[facility] CD_SPEECH:82389}      Thought Process/Content: {Thought Process/Content:004521808}      Affective Functioning: {Affective Functionin}      Mood: {Mood:420972741}      Level of consciousness:  {Level of consciousness:274574202}      Response to LearninTerrell Narvaez [facility] Responses to Learnin}      Endings: {[facility] Endings:96526}    Modes of Intervention: {[facility] Modes of Intervention:426695706}      Discipline Responsible: Kirstin Narvaez [facility] Multidisciplinary:074271970}      Signature:  Gustabo Kaba RN
no

## 2023-02-23 NOTE — PROGRESS NOTES
Pt. refused to attend the 1000 skills group, despite staff encouragement. Electronically signed by AURELIA Nicole on 2/23/2023 at 2:30 PM

## 2023-02-23 NOTE — PROGRESS NOTES
Morning Community Meeting Topics    Ajith Hoff attended the morning community meeting on 2/23/23. Topics discussed today     [x] Introduction  Day of the week and date  Mask distribution  Current mask requirements  [x]Teams  Explanation of  Green and Blue team criteria  Nurses assigned to each team for today  Explanation about green and blue paper  Date  Patient's Name  Patient's Nurse  Goals  [x] Visitation  Announce the visiting hours for the day  Announce which team is allowed to have visitors for the day  Review any updated Covid 19 requirements for visitors during visitation  Vaccine Card or negative Covid test within 48 hours of visit  State Identification  Patients are reminded to alert the  at least 1 hour before visitation   [x] Unit Orientation  Coffee use  Phone location and etiquette  Shower locations  [place] and dryer location and process  Common area expectations  Staff rounds expectation  [x] Meals   Educate patient to the menu  The patient is encouraged to fill out the menu to get preferences at mealtime  The patient is educated that if they do not fill out the menu, they will get the standard tray  The coffee pot is decaf, patient encouraged to order regular coffee from menu.   Educate patient to the meal process  Patient encouraged to eat snacks provided twice daily  Snacks may stay in patient room     [x] Discharge Process  Discharge expectations  Fill out the survey after discharge   [x] Hygiene  Daily showers encouraged  Showers availability discussed   Daily dressing encouraged  Discussed wearing street clothing  Education provided on where to place linens and clothing  Linens in the hamper  personal clothing does not go into the linen hamper  [x] Group   Patient encouraged to attend group provided  Time of Group Meetings discussed  Gentle reminder that attendance is a Physician order  [x] Movement  Chair exercises completed  Stretching completed  Notes: Goal - \"Stay positive\" Electronically signed by Luis Enrique Munguia [address] on 2/23/2023 at 2:29 PM

## 2023-02-24 VITALS
OXYGEN SATURATION: 97 % | HEART RATE: 72 BPM | DIASTOLIC BLOOD PRESSURE: 81 MMHG | BODY MASS INDEX: 30.44 KG/M2 | SYSTOLIC BLOOD PRESSURE: 124 MMHG | RESPIRATION RATE: 20 BRPM | TEMPERATURE: 98.2 F | WEIGHT: 250 LBS | HEIGHT: 76 IN

## 2023-02-24 PROCEDURE — 6370000000 HC RX 637 (ALT 250 FOR IP): Performed by: PSYCHIATRY & NEUROLOGY

## 2023-02-24 RX ORDER — PRAZOSIN HYDROCHLORIDE 2 MG/1
2 CAPSULE ORAL NIGHTLY
Qty: 15 CAPSULE | Refills: 3 | Status: SHIPPED | OUTPATIENT
Start: 2023-02-24

## 2023-02-24 RX ORDER — LITHIUM CARBONATE 150 MG/1
450 CAPSULE ORAL 2 TIMES DAILY WITH MEALS
Qty: 90 CAPSULE | Refills: 2 | Status: SHIPPED | OUTPATIENT
Start: 2023-02-24

## 2023-02-24 RX ORDER — MULTIVITAMIN WITH IRON
1 TABLET ORAL DAILY
Qty: 30 TABLET | Refills: 1 | Status: SHIPPED | OUTPATIENT
Start: 2023-02-24

## 2023-02-24 RX ORDER — AMLODIPINE BESYLATE 10 MG/1
10 TABLET ORAL DAILY
Qty: 30 TABLET | Refills: 0 | Status: SHIPPED | OUTPATIENT
Start: 2023-02-24

## 2023-02-24 RX ORDER — QUETIAPINE FUMARATE 25 MG/1
25 TABLET, FILM COATED ORAL 2 TIMES DAILY
Qty: 30 TABLET | Refills: 3 | Status: SHIPPED | OUTPATIENT
Start: 2023-02-24

## 2023-02-24 RX ORDER — VALSARTAN 320 MG/1
320 TABLET ORAL DAILY
Qty: 30 TABLET | Refills: 0 | Status: SHIPPED | OUTPATIENT
Start: 2023-02-24 | End: 2023-03-26

## 2023-02-24 RX ORDER — QUETIAPINE FUMARATE 100 MG/1
100 TABLET, FILM COATED ORAL NIGHTLY
Qty: 15 TABLET | Refills: 3 | Status: SHIPPED | OUTPATIENT
Start: 2023-02-24

## 2023-02-24 RX ADMIN — AMLODIPINE BESYLATE 10 MG: 10 TABLET ORAL at 08:30

## 2023-02-24 RX ADMIN — QUETIAPINE FUMARATE 25 MG: 25 TABLET ORAL at 08:30

## 2023-02-24 RX ADMIN — LITHIUM CARBONATE 450 MG: 300 CAPSULE, GELATIN COATED ORAL at 08:30

## 2023-02-24 RX ADMIN — NICOTINE POLACRILEX 4 MG: 4 GUM, CHEWING BUCCAL at 08:53

## 2023-02-24 RX ADMIN — GABAPENTIN 100 MG: 100 CAPSULE ORAL at 08:30

## 2023-02-24 RX ADMIN — Medication 100 MG: at 08:30

## 2023-02-24 RX ADMIN — VALSARTAN 320 MG: 160 TABLET, FILM COATED ORAL at 08:30

## 2023-02-24 RX ADMIN — NICOTINE POLACRILEX 4 MG: 4 GUM, CHEWING BUCCAL at 11:18

## 2023-02-24 RX ADMIN — HYDROXYZINE PAMOATE 50 MG: 50 CAPSULE ORAL at 08:53

## 2023-02-24 RX ADMIN — FOLIC ACID 1 MG: 1 TABLET ORAL at 08:30

## 2023-02-24 RX ADMIN — THERA TABS 1 TABLET: TAB at 08:30

## 2023-02-24 NOTE — CARE COORDINATION
Family/Support Name: Karen Almeida #: [phone]  Relationship to Patient: Ex/child's mom    Placed call to above for collateral information,    Response: Attempted to call Lidia to update her and ask if she'd be able to bring pt his belongings, no answer. Left  requesting return call.

## 2023-02-24 NOTE — CARE COORDINATION
Family/Support Name: Miguelangel Arzate #: [phone]  Relationship to Patient: Brother        Response:  Called brother to update on pt discharge plan and to get pt belongings. No answer, left voicemail requesting return call.

## 2023-02-24 NOTE — PROGRESS NOTES
Aurelio Hernandez [facility]. FOLLOW-UP NOTE       2/24/2023     Patient was seen and examined in person, Chart reviewed   Patient's case discussed with staff/team    Chief Complaint: depression, SI    Interim History:     Adequate appetite and sleep  Remains flat, blunted, incongruent   Endorse depression however improving   Denies SI HI AVH; no delusions reported  Pt reports desire to DC to sober living and abstain from substance use    Improving ADLs    Appetite:   [x] Normal/Unchanged  [] Increased  [] Decreased      Sleep:       [x] Normal/Unchanged  [] Fair       [] Poor              Energy:    [x] Normal/Unchanged  [] Increased  [] Decreased        SI [] Present  [x] Absent    HI  []Present  [x] Absent     Aggression:  [] yes  [x] no    Patient is [x] able  [] unable to CONTRACT FOR SAFETY     PAST MEDICAL/PSYCHIATRIC HISTORY:   Past Medical History:   Diagnosis Date    Depression     Hypertension 2015    trx with pills x 1 month / patient did not follow up    Smoker        FAMILY/SOCIAL HISTORY:  Family History   Problem Relation Age of Onset    High Blood Pressure Mother     Heart Disease Mother     Other Mother         copd / smoker    Stroke Father     High Blood Pressure Brother     Other Brother         anxiety     Social History     Socioeconomic History    Marital status: Single     Spouse name: Not on file    Number of children: Not on file    Years of education: Not on file    Highest education level: Not on file   Occupational History    Not on file   Tobacco Use    Smoking status: Every Day     Packs/day: 1.00     Years: 20.00     Pack years: 20.00     Types: Cigarettes    Smokeless tobacco: Never   Substance and Sexual Activity    Alcohol use: Not Currently     Comment: 5th of vodka a day    Drug use: Yes     Types: Marijuana (Tadeo Drain), Opiates     Sexual activity: Not on file   Other Topics Concern    Not on file   Social History Narrative    Not on file     Social Determinants of Health     Financial Resource Strain: Low Risk     Difficulty of Paying Living Expenses: Not hard at all   Food Insecurity: No Food Insecurity    Worried About Running Out of Food in the Last Year: Never true    Ran Out of Food in the Last Year: Never true   Transportation Needs: Not on file   Physical Activity: Not on file   Stress: Not on file   Social Connections: Not on file   Intimate Partner Violence: Not on file   Housing Stability: Not on file           ROS:  [x] All negative/unchanged except if checked.  Explain positive(checked items) below:  [] Constitutional  [] Eyes  [] Ear/Nose/Mouth/Throat  [] Respiratory  [] CV  [] GI  []   [] Musculoskeletal  [] Skin/Breast  [] Neurological  [] Endocrine  [] Heme/Lymph  [] Allergic/Immunologic    Explanation:     MEDICATIONS:    Current Facility-Administered Medications:     prazosin (MINIPRESS) capsule 2 mg, 2 mg, Oral, Nightly, Sara Farris MD, 2 mg at 02/23/23 2039    QUEtiapine (SEROQUEL) tablet 100 mg, 100 mg, Oral, Nightly, Roland Martinez MD, 100 mg at 02/23/23 2040    lithium capsule 450 mg, 450 mg, Oral, BID WC, Roland Martinez MD, 450 mg at 02/24/23 0830    QUEtiapine (SEROQUEL) tablet 25 mg, 25 mg, Oral, BID, Sara Farris MD, 25 mg at 02/24/23 0830    ondansetron (ZOFRAN-ODT) disintegrating tablet 4 mg, 4 mg, Oral, Q8H PRN **OR** ondansetron (ZOFRAN) injection 4 mg, 4 mg, IntraVENous, Q6H PRN, Sara Farris MD    ibuprofen (ADVIL;MOTRIN) tablet 600 mg, 600 mg, Oral, Q6H PRN, Sara Farris MD, 600 mg at 02/23/23 0857    nicotine polacrilex (NICORETTE) gum 4 mg, 4 mg, Oral, Q2H PRN, Sara Farris MD, 4 mg at 02/24/23 0853    gabapentin (NEURONTIN) capsule 100 mg, 100 mg, Oral, TID, Sara Farris MD, 100 mg at 02/24/23 0830    hydrOXYzine (VISTARIL) injection 50 mg, 50 mg, IntraMUSCular, Q6H PRN **OR** hydrOXYzine pamoate (VISTARIL) capsule 50 mg, 50 mg, Oral, Q6H PRN, Sara Farris MD, 50 mg at 02/24/23 3325 valsartan (DIOVAN) tablet 320 mg, 320 mg, Oral, Daily, Elías Diehl MD, 320 mg at 02/24/23 0830    amLODIPine (NORVASC) tablet 10 mg, 10 mg, Oral, Daily, Elías Diehl MD, 10 mg at 66/32/52 3753    folic acid (FOLVITE) tablet 1 mg, 1 mg, Oral, Daily, Elías Diehl MD, 1 mg at 02/24/23 0830    multivitamin 1 tablet, 1 tablet, Oral, Daily, Elías Diehl MD, 1 tablet at 02/24/23 0830    thiamine tablet 100 mg, 100 mg, Oral, Daily, Elías Diehl MD, 100 mg at 02/24/23 0830    acetaminophen (TYLENOL) tablet 650 mg, 650 mg, Oral, Q4H PRN, Elías Diehl MD, 650 mg at 02/21/23 1406    magnesium hydroxide (MILK OF MAGNESIA) 400 MG/5ML suspension 30 mL, 30 mL, Oral, Daily PRN, Elías Diehl MD    aluminum & magnesium hydroxide-simethicone (MAALOX) 200-200-20 MG/5ML suspension 30 mL, 30 mL, Oral, PRN, Elías Diehl MD, 30 mL at 02/15/23 1950    haloperidol (HALDOL) tablet 5 mg, 5 mg, Oral, Q6H PRN **OR** haloperidol lactate (HALDOL) injection 5 mg, 5 mg, IntraMUSCular, Q6H PRN, Elías Diehl MD    benztropine mesylate (COGENTIN) injection 2 mg, 2 mg, IntraMUSCular, BID PRN, Elías Diehl MD    traZODone (DESYREL) tablet 50 mg, 50 mg, Oral, Nightly PRN, Elías Diehl MD, 50 mg at 02/15/23 2051      Examination:  /81   Pulse 72   Temp 98.2 °F (36.8 °C)   Resp 20   Ht 6' 4\" (1.93 m)   Wt 250 lb (113.4 kg)   SpO2 97%   BMI 30.43 kg/m²   Gait - steady  Medication side effects(SE): denies     Mental Status Examination:    Level of consciousness:  within normal limits   Appearance:  fair grooming and fair hygiene  Behavior/Motor:  no abnormalities noted  Attitude toward examiner:  cooperative, attentive, and good eye contact  Speech:  spontaneous, normal rate, and monotone  Mood: depressed decreased range but reported to be improving  Affect:  flat  Thought processes: less circumstantial   Thought content:  Suicidal Ideation:  denies; denies homicidal ideation  Delusions:  no evidence of delusions  Perceptual Disturbance: denies AVH  Cognition:  oriented to person, place, and time   Concentration distractible  Insight fair   Judgement fair     ASSESSMENT:   Patient symptoms are:  [x] Well controlled  [x] Improving  [] Worsening  [] No change      Diagnosis:   Principal Problem:    Bipolar disorder with depression ([facility] [address]  Resolved Problems:    * No resolved hospital problems. *      LABS:    No results for input(s): WBC, HGB, PLT in the last 72 hours. No results for input(s): NA, K, CL, CO2, BUN, CREATININE, GLUCOSE in the last 72 hours. No results for input(s): BILITOT, ALKPHOS, AST, ALT in the last 72 hours. Lab Results   Component Value Date/Time    LABAMPH Neg 02/11/2023 11:30 AM    BARBSCNU Neg 02/11/2023 11:30 AM    LABBENZ Neg 02/11/2023 11:30 AM    LABMETH Neg 02/11/2023 11:30 AM    OPIATESCREENURINE Neg 02/11/2023 11:30 AM    PHENCYCLIDINESCREENURINE Neg 02/11/2023 11:30 AM    ETOH 82 02/11/2023 06:15 PM     Lab Results   Component Value Date/Time    TSH 0.805 02/11/2023 12:42 PM     Lab Results   Component Value Date    LITHIUM 0.3 (L) 02/17/2023     Lab Results   Component Value Date    VALPROATE 13.5 (L) 12/02/2022       RISK ASSESSMENT: low    Treatment Plan:  Reviewed current Medications with the patient. Monitor efficacy and tolerability of current med regime  Refused ECT regime  Risks, benefits, side effects, drug-to-drug interactions and alternatives to treatment were discussed. Collateral information: see social work notes  CD evaluation ETOH   Encourage patient to attend group and other milieu activities.   Discharge planning discussed with the patient and treatment team; tentative DC tomorrow to [facility]    PSYCHOTHERAPY/COUNSELING:  [x] Therapeutic interview  [x] Supportive  [] CBT  [] Ongoing  [] Other    [x] Patient continues to need, on a daily basis, active treatment furnished directly by or requiring the supervision of inpatient psychiatric personnel Anticipated Length of stay: 1 days       Electronically signed by ERNESTINE Ambriz CNP on 2/24/2023 at 9:24 AM     Addendum:  Enmanuel Rae seen with NP after attending the team meeting, discussing the patient with the nursing and social work staff. I participated during the interview process as well as the treatment plan and spent more than 70% of the time with the nurse practitioner helping me in documentation.   I agree with the above documentation of clinical finding and treatment plan    Physician Signature: Electronically signed by Irvin Hutchison MD on 2/24/2023 at 9:28 AM

## 2023-02-24 NOTE — CARE COORDINATION
Family/Support Name: Donna Singer #: [phone]  Relationship to Patient: [place] contact      Response:  Spoke with Mira Hartmann to let him know pt will not be going to [place] so the bed does not need to be held.

## 2023-02-24 NOTE — PROGRESS NOTES
Pt. refused to attend the 1000 skills group, despite staff encouragement.   Electronically signed by Hollis Aponte on 2/24/2023 at 12:09 PM

## 2023-02-24 NOTE — CARE COORDINATION
2/23/23 @ 9:00    Larry Oakley from [facility] reported bed availability for patient at [facility]. She will be on the unit tomorrow at 12:30 to  and transport patient.

## 2023-02-24 NOTE — PROGRESS NOTES
Morning Community Meeting Topics    Charolet Severance attended the morning community meeting on 2/24/23. Topics discussed today     [x] Introduction  Day of the week and date  Mask distribution  Current mask requirements  [x]Teams  Explanation of  Green and Blue team criteria  Nurses assigned to each team for today  Explanation about green and blue paper  Date  Patient's Name  Patient's Nurse  Goals  [x] Visitation  Announce the visiting hours for the day  Announce which team is allowed to have visitors for the day  Review any updated Covid 19 requirements for visitors during visitation  Vaccine Card or negative Covid test within 48 hours of visit  State Identification  Patients are reminded to alert the  at least 1 hour before visitation   [x] Unit Orientation  Coffee use  Phone location and etiquette  Shower locations  [place] and dryer location and process  Common area expectations  Staff rounds expectation  [x] Meals   Educate patient to the menu  The patient is encouraged to fill out the menu to get preferences at mealtime  The patient is educated that if they do not fill out the menu, they will get the standard tray  The coffee pot is decaf, patient encouraged to order regular coffee from menu.   Educate patient to the meal process  Patient encouraged to eat snacks provided twice daily  Snacks may stay in patient room     [x] Discharge Process  Discharge expectations  Fill out the survey after discharge   [x] Hygiene  Daily showers encouraged  Showers availability discussed   Daily dressing encouraged  Discussed wearing street clothing  Education provided on where to place linens and clothing  Linens in the hamper  personal clothing does not go into the linen hamper  [x] Group   Patient encouraged to attend group provided  Time of Group Meetings discussed  Gentle reminder that attendance is a Physician order  [x] Movement  Chair exercises completed  Stretching completed  Notes:  GOAL : \" to stay calm and and go home\" Electronically signed by Claudine Etienne on 2/24/2023 at 9:41 AM

## 2023-02-24 NOTE — CARE COORDINATION
Family/Support Name: Kathleen Rey #: [phone]  Relationship to Patient: [facility]    Response:  Spoke with Negro Chávez at [facility]. Confirmed time of  for pt at 1230. They would like the 2 weeks of bottled meds as well as paper prescriptions.

## 2023-02-24 NOTE — CARE COORDINATION
Family/Support Name: Vinita De Guzman #: [phone]  Relationship to Patient: Ex/child's mom      Response:  Call placed in attempts to update [facility]. No answer. VM left requesting return call.

## 2023-10-02 ENCOUNTER — OFFICE VISIT (OUTPATIENT)
Dept: FAMILY MEDICINE CLINIC | Age: 43
End: 2023-10-02
Payer: COMMERCIAL

## 2023-10-02 VITALS
HEART RATE: 74 BPM | BODY MASS INDEX: 35.31 KG/M2 | HEIGHT: 76 IN | DIASTOLIC BLOOD PRESSURE: 84 MMHG | SYSTOLIC BLOOD PRESSURE: 134 MMHG | OXYGEN SATURATION: 98 % | WEIGHT: 290 LBS | TEMPERATURE: 97.9 F

## 2023-10-02 DIAGNOSIS — I10 PRIMARY HYPERTENSION: Primary | Chronic | ICD-10-CM

## 2023-10-02 DIAGNOSIS — M53.3 SACROILIAC DYSFUNCTION: ICD-10-CM

## 2023-10-02 DIAGNOSIS — F31.9 BIPOLAR DEPRESSION (HCC): ICD-10-CM

## 2023-10-02 DIAGNOSIS — F31.64 BIPOLAR DISORD, CRNT EPISODE MIXED, SEVERE, W PSYCH FEATURES (HCC): ICD-10-CM

## 2023-10-02 PROCEDURE — 99214 OFFICE O/P EST MOD 30 MIN: CPT | Performed by: FAMILY MEDICINE

## 2023-10-02 PROCEDURE — 3079F DIAST BP 80-89 MM HG: CPT | Performed by: FAMILY MEDICINE

## 2023-10-02 PROCEDURE — 3075F SYST BP GE 130 - 139MM HG: CPT | Performed by: FAMILY MEDICINE

## 2023-10-02 RX ORDER — HYDROXYZINE PAMOATE 100 MG/1
CAPSULE ORAL
COMMUNITY
Start: 2023-09-07

## 2023-10-02 RX ORDER — LURASIDONE HYDROCHLORIDE 60 MG/1
TABLET, FILM COATED ORAL
COMMUNITY
Start: 2023-09-07

## 2023-10-02 RX ORDER — AMLODIPINE BESYLATE 10 MG/1
10 TABLET ORAL DAILY
Qty: 90 TABLET | Refills: 1 | Status: CANCELLED | OUTPATIENT
Start: 2023-10-02

## 2023-10-02 RX ORDER — DIVALPROEX SODIUM 250 MG/1
TABLET, DELAYED RELEASE ORAL
COMMUNITY
Start: 2023-08-25 | End: 2023-10-02 | Stop reason: SDUPTHER

## 2023-10-02 RX ORDER — BUSPIRONE HYDROCHLORIDE 30 MG/1
30 TABLET ORAL 2 TIMES DAILY
COMMUNITY
Start: 2023-09-06

## 2023-10-02 RX ORDER — CLONIDINE HYDROCHLORIDE 0.1 MG/1
0.1 TABLET ORAL 3 TIMES DAILY
COMMUNITY
Start: 2023-09-06

## 2023-10-02 RX ORDER — ESCITALOPRAM OXALATE 20 MG/1
TABLET ORAL
COMMUNITY
Start: 2023-09-07

## 2023-10-02 RX ORDER — PRAZOSIN HYDROCHLORIDE 2 MG/1
2 CAPSULE ORAL NIGHTLY
Qty: 90 CAPSULE | Refills: 1 | Status: SHIPPED | OUTPATIENT
Start: 2023-10-02

## 2023-10-02 RX ORDER — DIVALPROEX SODIUM 250 MG/1
250 TABLET, DELAYED RELEASE ORAL 2 TIMES DAILY
Qty: 180 TABLET | Refills: 1 | Status: SHIPPED | OUTPATIENT
Start: 2023-10-02

## 2023-10-02 RX ORDER — VALSARTAN 320 MG/1
320 TABLET ORAL DAILY
Qty: 90 TABLET | Refills: 1 | Status: SHIPPED | OUTPATIENT
Start: 2023-10-02

## 2023-10-02 RX ORDER — DOXEPIN HYDROCHLORIDE 25 MG/1
CAPSULE ORAL
COMMUNITY
Start: 2023-09-06

## 2023-10-02 NOTE — PROGRESS NOTES
Mark Allen Showers 1980 is a 39 y.o. male who presents today with:  Chief Complaint   Patient presents with    Flank Pain     Reports he woke up this morning with flank pain. Pain was worst with bending over. Denies hx kidney stones. Denies fevers, chills, burning with urination, hematuria/changes in urine color, or urinary frequency. Denies leg pain. Hypertension     He does not monitor his BP at home. Denies chest pains, palpitations, SOB, dizziness, lightheadedness, headaches or edema. Depression     Also reports that he is out of his Depakote. He was scheduled for a virtual visit with his psychiatrist  last week, but was unable to connect online to the visit. Requesting a short term supply until he can reschedule his appointment. I have reviewed HPI and agree with above. Friend move over the weekend. The soreness started after that. He has had no radicular symptoms. Difficulty with bending over and standing up straight. He has been taking the valsartan. He has not taken the amlodipine in some time. He does not check his blood pressure at home. It is good here in the office. Review of Systems   Genitourinary:  Positive for flank pain. Psychiatric/Behavioral:  Positive for depression. All other systems reviewed and are negative.       Past Medical History:   Diagnosis Date    Smoker      Past Surgical History:   Procedure Laterality Date    ME TYMPANOSTOMY GENERAL ANESTHESIA Bilateral 2/8/2018    BILATERAL MYRINGOTOMY WITH VENTILING TUBE INSERTION (PAT DONE 1/22/18) performed by Gabriela Mars MD at [address] [facility] History     Socioeconomic History    Marital status: Single     Spouse name: Not on file    Number of children: Not on file    Years of education: Not on file    Highest education level: Not on file   Occupational History    Not on file   Tobacco Use    Smoking status: Every Day     Packs/day: 1.00     Years: 20.00

## 2024-03-20 NOTE — PROGRESS NOTES
Multiple Vitamin (MULTIVITAMIN) TABS tablet Take 1 tablet by mouth daily 30 tablet 1     No current facility-administered medications on file prior to visit.            Review of Systems   Constitutional:  Negative for chills, diaphoresis, fatigue and fever.   HENT:  Negative for congestion, dental problem, drooling, ear discharge, facial swelling, hearing loss, mouth sores, nosebleeds, postnasal drip, rhinorrhea, sinus pressure, sinus pain, sneezing, sore throat, tinnitus, trouble swallowing and voice change.    Eyes:  Negative for photophobia, pain, discharge, redness, itching and visual disturbance.   Respiratory:  Negative for apnea, cough, chest tightness, shortness of breath and wheezing.    Cardiovascular:  Negative for chest pain, palpitations and leg swelling.   Gastrointestinal:  Negative for abdominal distention, abdominal pain, blood in stool, constipation, diarrhea, nausea, rectal pain and vomiting.   Endocrine: Negative for cold intolerance, heat intolerance, polydipsia, polyphagia and polyuria.   Genitourinary:  Negative for decreased urine volume, difficulty urinating, dysuria, flank pain, frequency, genital sores, hematuria and urgency.   Musculoskeletal:  Negative for arthralgias, back pain, gait problem, joint swelling, myalgias, neck pain and neck stiffness.   Skin:  Negative for color change, rash and wound.   Allergic/Immunologic: Negative for environmental allergies and food allergies.   Neurological:  Negative for dizziness, tremors, seizures, syncope, facial asymmetry, speech difficulty, weakness, light-headedness, numbness and headaches.   Hematological:  Negative for adenopathy. Does not bruise/bleed easily.   Psychiatric/Behavioral:  Negative for agitation, confusion, decreased concentration, hallucinations, self-injury, sleep disturbance and suicidal ideas. The patient is not nervous/anxious.        Objective:   /80   Pulse 75   Resp 16   Ht 1.93 m (6' 4\")   Wt 135.6 kg (299

## 2024-04-02 ENCOUNTER — OFFICE VISIT (OUTPATIENT)
Dept: FAMILY MEDICINE CLINIC | Age: 44
End: 2024-04-02
Payer: COMMERCIAL

## 2024-04-02 VITALS
SYSTOLIC BLOOD PRESSURE: 120 MMHG | RESPIRATION RATE: 16 BRPM | WEIGHT: 299 LBS | DIASTOLIC BLOOD PRESSURE: 80 MMHG | OXYGEN SATURATION: 95 % | HEART RATE: 75 BPM | BODY MASS INDEX: 36.41 KG/M2 | HEIGHT: 76 IN

## 2024-04-02 DIAGNOSIS — F41.9 ANXIETY: ICD-10-CM

## 2024-04-02 DIAGNOSIS — F31.9 BIPOLAR AFFECTIVE DISORDER, REMISSION STATUS UNSPECIFIED (HCC): ICD-10-CM

## 2024-04-02 DIAGNOSIS — I10 PRIMARY HYPERTENSION: Primary | ICD-10-CM

## 2024-04-02 DIAGNOSIS — I10 PRIMARY HYPERTENSION: ICD-10-CM

## 2024-04-02 LAB
ALBUMIN SERPL-MCNC: 4.8 G/DL (ref 3.5–4.6)
ALP SERPL-CCNC: 94 U/L (ref 35–104)
ALT SERPL-CCNC: 30 U/L (ref 0–41)
ANION GAP SERPL CALCULATED.3IONS-SCNC: 16 MEQ/L (ref 9–15)
AST SERPL-CCNC: 19 U/L (ref 0–40)
BASOPHILS # BLD: 0 K/UL (ref 0–0.2)
BASOPHILS NFR BLD: 0.5 %
BILIRUB SERPL-MCNC: 0.3 MG/DL (ref 0.2–0.7)
BUN SERPL-MCNC: 13 MG/DL (ref 6–20)
CALCIUM SERPL-MCNC: 10.3 MG/DL (ref 8.5–9.9)
CHLORIDE SERPL-SCNC: 101 MEQ/L (ref 95–107)
CHOLEST SERPL-MCNC: 264 MG/DL (ref 0–199)
CO2 SERPL-SCNC: 25 MEQ/L (ref 20–31)
CREAT SERPL-MCNC: 1.02 MG/DL (ref 0.7–1.2)
EOSINOPHIL # BLD: 0.2 K/UL (ref 0–0.7)
EOSINOPHIL NFR BLD: 2.2 %
ERYTHROCYTE [DISTWIDTH] IN BLOOD BY AUTOMATED COUNT: 12.5 % (ref 11.5–14.5)
GLOBULIN SER CALC-MCNC: 2.8 G/DL (ref 2.3–3.5)
GLUCOSE SERPL-MCNC: 126 MG/DL (ref 70–99)
HCT VFR BLD AUTO: 44.4 % (ref 42–52)
HDLC SERPL-MCNC: 27 MG/DL (ref 40–59)
HGB BLD-MCNC: 15 G/DL (ref 14–18)
LDL CHOLESTEROL CALCULATED: ABNORMAL MG/DL (ref 0–129)
LYMPHOCYTES # BLD: 2.7 K/UL (ref 1–4.8)
LYMPHOCYTES NFR BLD: 36.7 %
MCH RBC QN AUTO: 30.4 PG (ref 27–31.3)
MCHC RBC AUTO-ENTMCNC: 33.8 % (ref 33–37)
MCV RBC AUTO: 89.9 FL (ref 79–92.2)
MONOCYTES # BLD: 0.4 K/UL (ref 0.2–0.8)
MONOCYTES NFR BLD: 5.4 %
NEUTROPHILS # BLD: 4.1 K/UL (ref 1.4–6.5)
NEUTS SEG NFR BLD: 54.8 %
PLATELET # BLD AUTO: 241 K/UL (ref 130–400)
POTASSIUM SERPL-SCNC: 4.5 MEQ/L (ref 3.4–4.9)
PROT SERPL-MCNC: 7.6 G/DL (ref 6.3–8)
RBC # BLD AUTO: 4.94 M/UL (ref 4.7–6.1)
SODIUM SERPL-SCNC: 142 MEQ/L (ref 135–144)
TRIGLYCERIDE, FASTING: 427 MG/DL (ref 0–150)
WBC # BLD AUTO: 7.4 K/UL (ref 4.8–10.8)

## 2024-04-02 PROCEDURE — 99214 OFFICE O/P EST MOD 30 MIN: CPT | Performed by: INTERNAL MEDICINE

## 2024-04-02 PROCEDURE — G8417 CALC BMI ABV UP PARAM F/U: HCPCS | Performed by: INTERNAL MEDICINE

## 2024-04-02 PROCEDURE — 3074F SYST BP LT 130 MM HG: CPT | Performed by: INTERNAL MEDICINE

## 2024-04-02 PROCEDURE — 3079F DIAST BP 80-89 MM HG: CPT | Performed by: INTERNAL MEDICINE

## 2024-04-02 PROCEDURE — G8427 DOCREV CUR MEDS BY ELIG CLIN: HCPCS | Performed by: INTERNAL MEDICINE

## 2024-04-02 PROCEDURE — 4004F PT TOBACCO SCREEN RCVD TLK: CPT | Performed by: INTERNAL MEDICINE

## 2024-04-02 RX ORDER — PANTOPRAZOLE SODIUM 40 MG/1
40 TABLET, DELAYED RELEASE ORAL
Qty: 90 TABLET | Refills: 1 | Status: SHIPPED | OUTPATIENT
Start: 2024-04-02

## 2024-04-02 RX ORDER — VALSARTAN 320 MG/1
320 TABLET ORAL DAILY
Qty: 90 TABLET | Refills: 1 | Status: SHIPPED | OUTPATIENT
Start: 2024-04-02

## 2024-04-02 SDOH — ECONOMIC STABILITY: HOUSING INSECURITY
IN THE LAST 12 MONTHS, WAS THERE A TIME WHEN YOU DID NOT HAVE A STEADY PLACE TO SLEEP OR SLEPT IN A SHELTER (INCLUDING NOW)?: NO

## 2024-04-02 SDOH — ECONOMIC STABILITY: FOOD INSECURITY: WITHIN THE PAST 12 MONTHS, YOU WORRIED THAT YOUR FOOD WOULD RUN OUT BEFORE YOU GOT MONEY TO BUY MORE.: NEVER TRUE

## 2024-04-02 SDOH — ECONOMIC STABILITY: FOOD INSECURITY: WITHIN THE PAST 12 MONTHS, THE FOOD YOU BOUGHT JUST DIDN'T LAST AND YOU DIDN'T HAVE MONEY TO GET MORE.: NEVER TRUE

## 2024-04-02 SDOH — ECONOMIC STABILITY: INCOME INSECURITY: HOW HARD IS IT FOR YOU TO PAY FOR THE VERY BASICS LIKE FOOD, HOUSING, MEDICAL CARE, AND HEATING?: NOT HARD AT ALL

## 2024-04-02 ASSESSMENT — PATIENT HEALTH QUESTIONNAIRE - PHQ9
3. TROUBLE FALLING OR STAYING ASLEEP: NOT AT ALL
SUM OF ALL RESPONSES TO PHQ QUESTIONS 1-9: 0
1. LITTLE INTEREST OR PLEASURE IN DOING THINGS: NOT AT ALL
5. POOR APPETITE OR OVEREATING: NOT AT ALL
SUM OF ALL RESPONSES TO PHQ QUESTIONS 1-9: 0
9. THOUGHTS THAT YOU WOULD BE BETTER OFF DEAD, OR OF HURTING YOURSELF: NOT AT ALL
8. MOVING OR SPEAKING SO SLOWLY THAT OTHER PEOPLE COULD HAVE NOTICED. OR THE OPPOSITE, BEING SO FIGETY OR RESTLESS THAT YOU HAVE BEEN MOVING AROUND A LOT MORE THAN USUAL: NOT AT ALL
SUM OF ALL RESPONSES TO PHQ QUESTIONS 1-9: 0
6. FEELING BAD ABOUT YOURSELF - OR THAT YOU ARE A FAILURE OR HAVE LET YOURSELF OR YOUR FAMILY DOWN: NOT AT ALL
SUM OF ALL RESPONSES TO PHQ9 QUESTIONS 1 & 2: 0
7. TROUBLE CONCENTRATING ON THINGS, SUCH AS READING THE NEWSPAPER OR WATCHING TELEVISION: NOT AT ALL
SUM OF ALL RESPONSES TO PHQ QUESTIONS 1-9: 0
2. FEELING DOWN, DEPRESSED OR HOPELESS: NOT AT ALL
10. IF YOU CHECKED OFF ANY PROBLEMS, HOW DIFFICULT HAVE THESE PROBLEMS MADE IT FOR YOU TO DO YOUR WORK, TAKE CARE OF THINGS AT HOME, OR GET ALONG WITH OTHER PEOPLE: NOT DIFFICULT AT ALL
4. FEELING TIRED OR HAVING LITTLE ENERGY: NOT AT ALL

## 2024-04-02 ASSESSMENT — ENCOUNTER SYMPTOMS
SINUS PAIN: 0
SINUS PRESSURE: 0

## 2024-04-15 RX ORDER — PRAZOSIN HYDROCHLORIDE 2 MG/1
2 CAPSULE ORAL NIGHTLY
Qty: 90 CAPSULE | Refills: 1 | Status: SHIPPED | OUTPATIENT
Start: 2024-04-15

## 2024-04-19 NOTE — CONSULTS
MEDICAL HISTORY AND PHYSICAL             Date: 2/12/2023        Patient Name: Caden Smith     YOB: 1980      Age:  36 y.o. Chief Complaint     Chief Complaint   Patient presents with    Mental Health Problem     States SI   Pt that he been drinking since last night      History Obtained From   patient    History of Present Illness   The patient is a 69-year-old male with significant history of depression, anxiety, and hypertension, who was admitted for suicidal ideation. Patient complaining of worsening depression for the past 3 weeks. Currently, patient denies suicidal and homicidal ideation as well as any presence of visual, auditory, and tactile hallucinations. He also denies fever, chills, dizziness, shortness of breath, chest pain, and N/V/D. Past Medical History     Past Medical History:   Diagnosis Date    Depression     Hypertension 2015    trx with pills x 1 month / patient did not follow up    Smoker       Past Surgical History     Past Surgical History:   Procedure Laterality Date    NV CREATE EARDRUM OPENING,GEN ANESTH Bilateral 2/8/2018    BILATERAL MYRINGOTOMY WITH VENTILING TUBE INSERTION (PAT DONE 1/22/18) performed by Tanja Obregon MD at [facility]        Medications Prior to Admission     Prior to Admission medications    Medication Sig Start Date End Date Taking?  Authorizing Provider   amLODIPine (NORVASC) 10 MG tablet Take 1 tablet by mouth daily 1/6/23   Leeann Macias MD   valsartan (DIOVAN) 320 MG tablet Take 1 tablet by mouth daily 1/6/23 2/5/23  Leeann Macias MD   OXcarbazepine (TRILEPTAL) 300 MG tablet Take 1 tablet by mouth 2 times daily 1/6/23   Leeann Macias MD   traZODone (DESYREL) 50 MG tablet Take 1 tablet by mouth nightly as needed for Sleep 12/6/22   Irina Jin, APRN - CNP   Multiple Vitamin (MULTIVITAMIN) TABS tablet Take 1 tablet by mouth daily 12/6/22   Irina Jin, APRN - CNP   pantoprazole (PROTONIX) 40 MG tablet Take 1 tablet by mouth every morning (before breakfast) 12/6/22   ERNESTINE Torre CNP   lurasidone (LATUDA) 40 MG TABS tablet Take 1 tablet by mouth daily 12/6/22   ERNESTINE Torre CNP        Allergies   Patient has no known allergies. Social History     Social History       Tobacco History       Smoking Status  Every Day Smoking Frequency  1 pack/day for 20.00 years (20.00 pk-yrs) Smoking Tobacco Type  Cigarettes      Smokeless Tobacco Use  Never              Alcohol History       Alcohol Use Status  Not Currently Comment  5th of vodka a day              Drug Use       Drug Use Status  Yes Types  Marijuana ([employer]), Opiates               Sexual Activity       Sexually Active  Not Asked                    Family History     Family History   Problem Relation Age of Onset    High Blood Pressure Mother     Heart Disease Mother     Other Mother         copd / smoker    Stroke Father     High Blood Pressure Brother     Other Brother         anxiety       Review of Systems   12 point review of systems reviewed with patient with pertinent positive listed in HPI, otherwise, negative.     Physical Exam   BP (!) 139/108   Pulse 83   Temp 98.4 °F (36.9 °C) (Oral)   Resp 18   Ht 6' 4\" (1.93 m)   Wt 250 lb (113.4 kg)   SpO2 98%   BMI 30.43 kg/m²     CONSTITUTIONAL:  awake, alert, cooperative, no apparent distress, and appears stated age  EYES:  sclera clear  ENT:  normocepalic, without obvious abnormality  NECK:  supple, symmetrical, trachea midline  BACK:  symmetric  LUNGS:  No increased work of breathing, good air exchange, clear to auscultation bilaterally, no crackles or wheezing  CARDIOVASCULAR:  Normal apical impulse, regular rate and rhythm, normal S1 and S2, no S3 or S4, and no murmur noted  ABDOMEN:  normal bowel sounds, non-distended, and non-tender  MUSCULOSKELETAL:  there is no redness, warmth, or swelling of the joints  full range of motion noted  NEUROLOGIC:  Mental Status Exam:  Level of Alertness:   awake  Orientation:   person, place, time  Memory:   normal  Fund of Knowledge:  normal  Attention/Concentration:  normal  SKIN:  normal skin color, texture, turgor and no redness, warmth, or swelling    Labs      Recent Results (from the past 24 hour(s))   Urine Drug Screen    Collection Time: 02/11/23 11:30 AM   Result Value Ref Range    Amphetamine Screen, Urine Neg Negative <1000 ng/mL    Barbiturate Screen, Ur Neg Negative < 200 ng/mL    Benzodiazepine Screen, Urine Neg Negative < 200 ng/mL    Cannabinoid Scrn, Ur Neg Negative < 50 ng/mL    Cocaine Metabolite Screen, Urine POSITIVE (A) Negative < 300 ng/mL    Opiate Scrn, Ur Neg Negative < 300 ng/mL    PCP Screen, Urine Neg Negative < 25 ng/mL    Methadone Screen, Urine Neg Negative <300 ng/mL    Propoxyphene Scrn, Ur Neg Negative <300 ng/mL    Oxycodone Urine Neg Negative <100 ng/mL    FENTANYL SCREEN, URINE Neg Negative < 50 ng/mL    Drug Screen Comment: see below    Urinalysis with Reflex to Culture    Collection Time: 02/11/23 11:30 AM    Specimen: Urine   Result Value Ref Range    Color, UA Yellow Straw/Yellow    Clarity, UA Clear Clear    Glucose, Ur Negative Negative mg/dL    Bilirubin Urine Negative Negative    Ketones, Urine TRACE (A) Negative mg/dL    Specific Gravity, UA 1.022 1.005 - 1.030    Blood, Urine Negative Negative    pH, UA 6.5 5.0 - 9.0    Protein, UA Negative Negative mg/dL    Urobilinogen, Urine 0.2 <2.0 E.U./dL    Nitrite, Urine Negative Negative    Leukocyte Esterase, Urine Negative Negative    Urine Reflex to Culture Not Indicated    COVID-19, Rapid    Collection Time: 02/11/23 11:37 AM    Specimen: Nasopharyngeal Swab   Result Value Ref Range    SARS-CoV-2, NAAT Not Detected Not Detected   Acetaminophen Level    Collection Time: 02/11/23 12:42 PM   Result Value Ref Range    Acetaminophen Level <5 (L) 10 - 30 ug/mL   CBC with Auto Differential    Collection Time: 02/11/23 12:42 PM   Result Value Ref Range    WBC 7.1 4.8 - 10.8 K/uL    RBC 4.68 (L) 4.70 - 6.10 M/uL    Hemoglobin 14.7 14.0 - 18.0 g/dL    Hematocrit 42.9 42.0 - 52.0 %    MCV 91.7 79.0 - 92.2 fL    MCH 31.4 (H) 27.0 - 31.3 pg    MCHC 34.2 33.0 - 37.0 %    RDW 13.8 11.5 - 14.5 %    Platelets 691 827 - 988 K/uL    Neutrophils % 50.8 %    Lymphocytes % 42.8 %    Monocytes % 5.0 %    Eosinophils % 0.9 %    Basophils % 0.5 %    Neutrophils Absolute 3.6 1.4 - 6.5 K/uL    Lymphocytes Absolute 3.0 1.0 - 4.8 K/uL    Monocytes Absolute 0.4 0.2 - 0.8 K/uL    Eosinophils Absolute 0.1 0.0 - 0.7 K/uL    Basophils Absolute 0.0 0.0 - 0.2 K/uL   CK    Collection Time: 02/11/23 12:42 PM   Result Value Ref Range    Total CK 97 0 - 190 U/L   Comprehensive Metabolic Panel    Collection Time: 02/11/23 12:42 PM   Result Value Ref Range    Sodium 147 (H) 135 - 144 mEq/L    Potassium 4.0 3.4 - 4.9 mEq/L    Chloride 108 (H) 95 - 107 mEq/L    CO2 26 20 - 31 mEq/L    Anion Gap 13 9 - 15 mEq/L    Glucose 122 (H) 70 - 99 mg/dL    BUN 11 6 - 20 mg/dL    Creatinine 0.87 0.70 - 1.20 mg/dL    Est, Glom Filt Rate >60.0 >60    Calcium 9.1 8.5 - 9.9 mg/dL    Total Protein 6.5 6.3 - 8.0 g/dL    Albumin 4.3 3.5 - 4.6 g/dL    Total Bilirubin <0.2 0.2 - 0.7 mg/dL    Alkaline Phosphatase 108 (H) 35 - 104 U/L    ALT 15 0 - 41 U/L    AST 16 0 - 40 U/L    Globulin 2.2 (L) 2.3 - 3.5 g/dL   Ethanol    Collection Time: 02/11/23 12:42 PM   Result Value Ref Range    Ethanol Lvl 207 mg/dL    Ethanol percent 0.182 G/dL   Lipid Panel    Collection Time: 02/11/23 12:42 PM   Result Value Ref Range    Cholesterol, Total 182 0 - 199 mg/dL    Triglycerides 333 (H) 0 - 150 mg/dL    HDL 33 (L) 40 - 59 mg/dL    LDL Calculated 82 0 - 362 mg/dL   Salicylate    Collection Time: 02/11/23 12:42 PM   Result Value Ref Range    Salicylate, Serum <7.8 (L) 15.0 - 30.0 mg/dL   TSH    Collection Time: 02/11/23 12:42 PM   Result Value Ref Range    TSH 0.805 0.440 - 3.860 uIU/mL   Ethanol    Collection Time: 02/11/23  6:15 PM   Result Value Ref Range    Ethanol Lvl 82 mg/dL    Ethanol percent 0.072 G/dL   Basic Metabolic Panel    Collection Time: 02/11/23  6:15 PM   Result Value Ref Range    Sodium 146 (H) 135 - 144 mEq/L    Potassium 3.7 3.4 - 4.9 mEq/L    Chloride 108 (H) 95 - 107 mEq/L    CO2 28 20 - 31 mEq/L    Anion Gap 10 9 - 15 mEq/L    Glucose 103 (H) 70 - 99 mg/dL    BUN 12 6 - 20 mg/dL    Creatinine 0.97 0.70 - 1.20 mg/dL    Est, Glom Filt Rate >60.0 >60    Calcium 9.3 8.5 - 9.9 mg/dL        Imaging/Diagnostics Last 24 Hours   No results found. Assessment      Hospital Problems             Last Modified POA    * (Principal) Bipolar disorder with depression ([facility] [address] 2/11/2023 Yes       Plan   Bipolar disorder with depression -- Dr. Singh Beach managing. Hypertension - elevated blood pressure 139/108. On Norvasc. Resumed home BP med, valsartan 320 mg daily. Cocaine abuse - urine drug screen (02/11/23) positive for cocaine. Thank you for consult. Hospital medicine managing acute needs. Patient will need to follow up with PCP for chronic disease management. Time spent evaluating and intervening patient, 35 minutes.     Consultations Ordered:  IP CONSULT TO HOSPITALIST  IP CONSULT TO SOCIAL WORK    Electronically signed by ERNESTINE Kaminski CNP on 2/12/23 at 10:16 AM EST Nomi Paige

## 2024-10-22 RX ORDER — PRAZOSIN HYDROCHLORIDE 2 MG/1
CAPSULE ORAL
Qty: 90 CAPSULE | Refills: 1 | Status: SHIPPED | OUTPATIENT
Start: 2024-10-22

## 2024-10-29 RX ORDER — PANTOPRAZOLE SODIUM 40 MG/1
40 TABLET, DELAYED RELEASE ORAL
Qty: 90 TABLET | Refills: 0 | Status: SHIPPED | OUTPATIENT
Start: 2024-10-29

## 2024-10-29 RX ORDER — VALSARTAN 320 MG/1
320 TABLET ORAL DAILY
Qty: 90 TABLET | Refills: 0 | Status: SHIPPED | OUTPATIENT
Start: 2024-10-29

## 2024-10-29 NOTE — TELEPHONE ENCOUNTER
Patient requesting medication refill. Please approve or deny this request.    Rx requested:  Requested Prescriptions     Pending Prescriptions Disp Refills    pantoprazole (PROTONIX) 40 MG tablet 90 tablet 1     Sig: Take 1 tablet by mouth every morning (before breakfast)    valsartan (DIOVAN) 320 MG tablet 90 tablet 1     Sig: Take 1 tablet by mouth daily         Last Office Visit:   4/2/2024      Next Visit Date:  No future appointments.

## 2024-11-04 RX ORDER — PANTOPRAZOLE SODIUM 40 MG/1
40 TABLET, DELAYED RELEASE ORAL
Qty: 90 TABLET | Refills: 0 | OUTPATIENT
Start: 2024-11-04

## 2024-11-04 RX ORDER — VALSARTAN 320 MG/1
320 TABLET ORAL DAILY
Qty: 90 TABLET | Refills: 0 | OUTPATIENT
Start: 2024-11-04

## 2025-01-27 RX ORDER — VALSARTAN 320 MG/1
320 TABLET ORAL DAILY
Qty: 90 TABLET | Refills: 0 | Status: SHIPPED | OUTPATIENT
Start: 2025-01-27

## 2025-01-27 RX ORDER — PANTOPRAZOLE SODIUM 40 MG/1
40 TABLET, DELAYED RELEASE ORAL
Qty: 90 TABLET | Refills: 0 | Status: SHIPPED | OUTPATIENT
Start: 2025-01-27

## 2025-01-27 NOTE — TELEPHONE ENCOUNTER
MEDICATION REFILL REQUEST     Rx Requested    Requested Prescriptions     Pending Prescriptions Disp Refills    valsartan (DIOVAN) 320 MG tablet 90 tablet 0     Sig: Take 1 tablet by mouth daily No more refills until patient is seen    pantoprazole (PROTONIX) 40 MG tablet 90 tablet 0     Sig: Take 1 tablet by mouth every morning (before breakfast) No more refills until patient is seen         Patient's Last Office Visit   4/2/2024      Patient's Next Office Visit   Future Appointments   Date Time Provider Department Center   2/4/2025  8:45 AM Yanick Razo MD [facility]         Other comments   Can short script be sent to last until appt

## 2025-02-13 ENCOUNTER — TELEPHONE (OUTPATIENT)
Age: 45
End: 2025-02-13

## 2025-02-13 NOTE — TELEPHONE ENCOUNTER
----- Message from Anita MARIE sent at 2/13/2025 11:14 AM EST -----  Regarding: [facility] Appointment Request  [facility] Appointment Request    Patient needs appointment for [facility] Appointment Type: Existing Condition Follow Up.    Patient Requested Dates(s):February 21, 2025  Patient Requested Time:Afternoon  Provider Name:Yanick Razo MD    Reason for Appointment Request: Established Patient - Available appointments did not meet patient need    Existing condition for his medication.  --------------------------------------------------------------------------------------------------------------------------    Relationship to Patient: Self     Call Back Information: OK to leave message on voicemail  Preferred Call Back Number: Phone [phone]

## 2025-04-24 RX ORDER — VALSARTAN 320 MG/1
320 TABLET ORAL DAILY
Qty: 90 TABLET | Refills: 0 | Status: SHIPPED | OUTPATIENT
Start: 2025-04-24

## 2025-04-24 RX ORDER — PANTOPRAZOLE SODIUM 40 MG/1
TABLET, DELAYED RELEASE ORAL
Qty: 90 TABLET | Refills: 0 | Status: SHIPPED | OUTPATIENT
Start: 2025-04-24

## 2025-04-24 RX ORDER — PRAZOSIN HYDROCHLORIDE 2 MG/1
CAPSULE ORAL
Qty: 90 CAPSULE | Refills: 0 | Status: SHIPPED | OUTPATIENT
Start: 2025-04-24

## 2025-05-05 RX ORDER — VALSARTAN 320 MG/1
320 TABLET ORAL DAILY
Qty: 90 TABLET | Refills: 0 | OUTPATIENT
Start: 2025-05-05

## 2025-05-05 RX ORDER — PRAZOSIN HYDROCHLORIDE 2 MG/1
CAPSULE ORAL
Qty: 90 CAPSULE | Refills: 0 | OUTPATIENT
Start: 2025-05-05

## 2025-05-05 RX ORDER — PANTOPRAZOLE SODIUM 40 MG/1
TABLET, DELAYED RELEASE ORAL
Qty: 90 TABLET | Refills: 0 | OUTPATIENT
Start: 2025-05-05

## 2025-08-01 RX ORDER — PANTOPRAZOLE SODIUM 40 MG/1
40 TABLET, DELAYED RELEASE ORAL DAILY
Qty: 90 TABLET | Refills: 0 | Status: SHIPPED | OUTPATIENT
Start: 2025-08-01

## 2025-08-01 RX ORDER — VALSARTAN 320 MG/1
320 TABLET ORAL DAILY
Qty: 90 TABLET | Refills: 0 | Status: SHIPPED | OUTPATIENT
Start: 2025-08-01

## 2025-08-01 RX ORDER — PRAZOSIN HYDROCHLORIDE 2 MG/1
2 CAPSULE ORAL NIGHTLY
Qty: 90 CAPSULE | Refills: 0 | Status: SHIPPED | OUTPATIENT
Start: 2025-08-01

## 2025-08-01 NOTE — TELEPHONE ENCOUNTER
Patient's Last Office Visit  4/2/2024     Patient's Next Visit  Future Appointments   Date Time Provider Department Center   8/7/2025  1:00 PM Cory Matt MD [facility]

## (undated) DEVICE — LABEL MED MINI W/ MARKER

## (undated) DEVICE — Device

## (undated) DEVICE — COVER,MAYO STAND,STERILE: Brand: MEDLINE

## (undated) DEVICE — 2000CC GUARDIAN II: Brand: GUARDIAN

## (undated) DEVICE — TOWEL,OR,DSP,ST,BLUE,STD,4/PK,20PK/CS: Brand: MEDLINE

## (undated) DEVICE — SHEET,DRAPE,53X77,STERILE: Brand: MEDLINE

## (undated) DEVICE — KIT PHENOL APPL UNIQUE FOAM TIP FOR ANES INCIS SITE DISP

## (undated) DEVICE — SYRINGE MED 3ML CLR PLAS STD N CTRL LUERLOCK TIP DISP

## (undated) DEVICE — Z CONVERTED USE 2271043 CONTAINER SPEC COLL 4OZ SCR ON LID PEEL PCH

## (undated) DEVICE — MEDI-VAC NON-CONDUCTIVE SUCTION TUBING: Brand: CARDINAL HEALTH

## (undated) DEVICE — GLOVE ORANGE PI 7 1/2   MSG9075